# Patient Record
Sex: FEMALE | Race: WHITE | NOT HISPANIC OR LATINO | URBAN - METROPOLITAN AREA
[De-identification: names, ages, dates, MRNs, and addresses within clinical notes are randomized per-mention and may not be internally consistent; named-entity substitution may affect disease eponyms.]

---

## 2017-12-21 ENCOUNTER — EMERGENCY (EMERGENCY)
Facility: HOSPITAL | Age: 67
LOS: 0 days | Discharge: HOME | End: 2017-12-21
Attending: EMERGENCY MEDICINE | Admitting: EMERGENCY MEDICINE
Payer: MEDICARE

## 2017-12-21 VITALS
OXYGEN SATURATION: 100 % | SYSTOLIC BLOOD PRESSURE: 116 MMHG | DIASTOLIC BLOOD PRESSURE: 94 MMHG | RESPIRATION RATE: 18 BRPM | TEMPERATURE: 98 F | HEART RATE: 78 BPM

## 2017-12-21 VITALS — HEIGHT: 64 IN | WEIGHT: 169.09 LBS

## 2017-12-21 LAB
ALBUMIN SERPL ELPH-MCNC: 3.9 G/DL — SIGNIFICANT CHANGE UP (ref 3.3–5)
ALP SERPL-CCNC: 74 U/L — SIGNIFICANT CHANGE UP (ref 40–120)
ALT FLD-CCNC: 26 U/L — SIGNIFICANT CHANGE UP (ref 12–78)
ANION GAP SERPL CALC-SCNC: 9 MMOL/L — SIGNIFICANT CHANGE UP (ref 5–17)
APPEARANCE UR: CLEAR — SIGNIFICANT CHANGE UP
APTT BLD: 30 SEC — SIGNIFICANT CHANGE UP (ref 27.5–37.4)
AST SERPL-CCNC: 14 U/L — LOW (ref 15–37)
BACTERIA # UR AUTO: (no result)
BASOPHILS # BLD AUTO: 0.1 K/UL — SIGNIFICANT CHANGE UP (ref 0–0.2)
BASOPHILS NFR BLD AUTO: 0.9 % — SIGNIFICANT CHANGE UP (ref 0–2)
BILIRUB SERPL-MCNC: 1.1 MG/DL — SIGNIFICANT CHANGE UP (ref 0.2–1.2)
BILIRUB UR-MCNC: NEGATIVE — SIGNIFICANT CHANGE UP
BUN SERPL-MCNC: 11 MG/DL — SIGNIFICANT CHANGE UP (ref 7–23)
CALCIUM SERPL-MCNC: 8.7 MG/DL — SIGNIFICANT CHANGE UP (ref 8.5–10.1)
CHLORIDE SERPL-SCNC: 100 MMOL/L — SIGNIFICANT CHANGE UP (ref 96–108)
CO2 SERPL-SCNC: 24 MMOL/L — SIGNIFICANT CHANGE UP (ref 22–31)
COLOR SPEC: YELLOW — SIGNIFICANT CHANGE UP
CREAT SERPL-MCNC: 0.94 MG/DL — SIGNIFICANT CHANGE UP (ref 0.5–1.3)
DIFF PNL FLD: NEGATIVE — SIGNIFICANT CHANGE UP
EOSINOPHIL # BLD AUTO: 0.2 K/UL — SIGNIFICANT CHANGE UP (ref 0–0.5)
EOSINOPHIL NFR BLD AUTO: 2 % — SIGNIFICANT CHANGE UP (ref 0–6)
EPI CELLS # UR: SIGNIFICANT CHANGE UP
GLUCOSE SERPL-MCNC: 90 MG/DL — SIGNIFICANT CHANGE UP (ref 70–99)
GLUCOSE UR QL: NEGATIVE MG/DL — SIGNIFICANT CHANGE UP
HCT VFR BLD CALC: 35.6 % — SIGNIFICANT CHANGE UP (ref 34.5–45)
HGB BLD-MCNC: 12.2 G/DL — SIGNIFICANT CHANGE UP (ref 11.5–15.5)
INR BLD: 1.14 RATIO — SIGNIFICANT CHANGE UP (ref 0.88–1.16)
KETONES UR-MCNC: NEGATIVE — SIGNIFICANT CHANGE UP
LACTATE SERPL-SCNC: 0.6 MMOL/L — LOW (ref 0.7–2)
LEUKOCYTE ESTERASE UR-ACNC: NEGATIVE — SIGNIFICANT CHANGE UP
LIDOCAIN IGE QN: 80 U/L — SIGNIFICANT CHANGE UP (ref 73–393)
LYMPHOCYTES # BLD AUTO: 2.2 K/UL — SIGNIFICANT CHANGE UP (ref 1–3.3)
LYMPHOCYTES # BLD AUTO: 22.2 % — SIGNIFICANT CHANGE UP (ref 13–44)
MCHC RBC-ENTMCNC: 30.3 PG — SIGNIFICANT CHANGE UP (ref 27–34)
MCHC RBC-ENTMCNC: 34.2 GM/DL — SIGNIFICANT CHANGE UP (ref 32–36)
MCV RBC AUTO: 88.5 FL — SIGNIFICANT CHANGE UP (ref 80–100)
MONOCYTES # BLD AUTO: 1.1 K/UL — HIGH (ref 0–0.9)
MONOCYTES NFR BLD AUTO: 10.6 % — SIGNIFICANT CHANGE UP (ref 2–14)
NEUTROPHILS # BLD AUTO: 6.4 K/UL — SIGNIFICANT CHANGE UP (ref 1.8–7.4)
NEUTROPHILS NFR BLD AUTO: 64.4 % — SIGNIFICANT CHANGE UP (ref 43–77)
NITRITE UR-MCNC: POSITIVE
PH UR: 5 — SIGNIFICANT CHANGE UP (ref 5–8)
PLATELET # BLD AUTO: 271 K/UL — SIGNIFICANT CHANGE UP (ref 150–400)
POTASSIUM SERPL-MCNC: 3.8 MMOL/L — SIGNIFICANT CHANGE UP (ref 3.5–5.3)
POTASSIUM SERPL-SCNC: 3.8 MMOL/L — SIGNIFICANT CHANGE UP (ref 3.5–5.3)
PROT SERPL-MCNC: 7.5 GM/DL — SIGNIFICANT CHANGE UP (ref 6–8.3)
PROT UR-MCNC: NEGATIVE MG/DL — SIGNIFICANT CHANGE UP
PROTHROM AB SERPL-ACNC: 12.3 SEC — SIGNIFICANT CHANGE UP (ref 9.8–12.7)
RBC # BLD: 4.02 M/UL — SIGNIFICANT CHANGE UP (ref 3.8–5.2)
RBC # FLD: 11.6 % — SIGNIFICANT CHANGE UP (ref 10.3–14.5)
RBC CASTS # UR COMP ASSIST: SIGNIFICANT CHANGE UP /HPF (ref 0–4)
SODIUM SERPL-SCNC: 133 MMOL/L — LOW (ref 135–145)
SP GR SPEC: 1.01 — SIGNIFICANT CHANGE UP (ref 1.01–1.02)
TROPONIN I SERPL-MCNC: <0.015 NG/ML — SIGNIFICANT CHANGE UP (ref 0.01–0.04)
UROBILINOGEN FLD QL: NEGATIVE MG/DL — SIGNIFICANT CHANGE UP
WBC # BLD: 10 K/UL — SIGNIFICANT CHANGE UP (ref 3.8–10.5)
WBC # FLD AUTO: 10 K/UL — SIGNIFICANT CHANGE UP (ref 3.8–10.5)
WBC UR QL: (no result)

## 2017-12-21 PROCEDURE — 93010 ELECTROCARDIOGRAM REPORT: CPT

## 2017-12-21 PROCEDURE — 99285 EMERGENCY DEPT VISIT HI MDM: CPT

## 2017-12-21 PROCEDURE — 74177 CT ABD & PELVIS W/CONTRAST: CPT | Mod: 26

## 2017-12-21 RX ORDER — METRONIDAZOLE 500 MG
1 TABLET ORAL
Qty: 30 | Refills: 0
Start: 2017-12-21 | End: 2017-12-30

## 2017-12-21 RX ORDER — SODIUM CHLORIDE 9 MG/ML
3 INJECTION INTRAMUSCULAR; INTRAVENOUS; SUBCUTANEOUS ONCE
Qty: 0 | Refills: 0 | Status: COMPLETED | OUTPATIENT
Start: 2017-12-21 | End: 2017-12-21

## 2017-12-21 RX ORDER — CIPROFLOXACIN LACTATE 400MG/40ML
500 VIAL (ML) INTRAVENOUS ONCE
Qty: 0 | Refills: 0 | Status: COMPLETED | OUTPATIENT
Start: 2017-12-21 | End: 2017-12-21

## 2017-12-21 RX ORDER — MOXIFLOXACIN HYDROCHLORIDE TABLETS, 400 MG 400 MG/1
1 TABLET, FILM COATED ORAL
Qty: 20 | Refills: 0
Start: 2017-12-21 | End: 2017-12-30

## 2017-12-21 RX ORDER — METRONIDAZOLE 500 MG
500 TABLET ORAL ONCE
Qty: 0 | Refills: 0 | Status: COMPLETED | OUTPATIENT
Start: 2017-12-21 | End: 2017-12-21

## 2017-12-21 RX ADMIN — Medication 500 MILLIGRAM(S): at 19:12

## 2017-12-21 RX ADMIN — SODIUM CHLORIDE 3 MILLILITER(S): 9 INJECTION INTRAMUSCULAR; INTRAVENOUS; SUBCUTANEOUS at 15:40

## 2017-12-21 NOTE — ED STATDOCS - PROGRESS NOTE DETAILS
67 yr. old female PMH; HLD presents to ED with worsening abdominal painonset yesterday morning. Seen at Urgent Care and sent to ED for further evaluation. No fever or chills. NO N/V/D Seen and examined by attending in Intake. Plan: IV, Labs, CT Will F/U with results and re evaluate. Criselda NP Attending Bebeto: Results of labs and CT d/w patient, printouts given and pt understands need for otpt f/u for further workup of adnexal mass

## 2017-12-21 NOTE — ED ADULT NURSE NOTE - OBJECTIVE STATEMENT
Pt presents to ED c/o lower abd pain with activity starting yesterday morning. Pt reports last normal BM last night

## 2017-12-21 NOTE — ED STATDOCS - ATTENDING CONTRIBUTION TO CARE
I, Harris Tate MD,  performed the initial face to face bedside interview with this patient regarding history of present illness, review of symptoms and relevant past medical, social and family history.  I completed an independent physical examination.  I was the initial provider who evaluated this patient. I have signed out the follow up of any pending tests (i.e. labs, radiological studies) to the ACP.  I have communicated the patient’s plan of care and disposition with the ACP.  The history, relevant review of systems, past medical and surgical history, medical decision making, and physical examination was documented by the scribe in my presence and I attest to the accuracy of the documentation.  I, Harris Tate MD, personally saw the patient with ACP.  I have personally performed a face to face diagnostic evaluation on this patient.  I have reviewed the ACP note and agree with the history, exam, and plan of care, except as noted.

## 2017-12-21 NOTE — ED STATDOCS - OBJECTIVE STATEMENT
68 y/o F with a PMHx of HLD presents to the ED c/o worsening abd pain that started yesterday morning upon wakening. Pt states that she was seen at urgent care this morning and advised to come to the ED to r/o peritonitis as she was experiencing rebound TTP. Pt states that she has had a hernia in the past, currently calm, no distress and denies fever, cough, chills, CP, SOB, NVD or any other acute c/o at this time.

## 2017-12-22 DIAGNOSIS — E78.5 HYPERLIPIDEMIA, UNSPECIFIED: ICD-10-CM

## 2017-12-22 DIAGNOSIS — N39.0 URINARY TRACT INFECTION, SITE NOT SPECIFIED: ICD-10-CM

## 2017-12-22 DIAGNOSIS — R10.9 UNSPECIFIED ABDOMINAL PAIN: ICD-10-CM

## 2017-12-22 DIAGNOSIS — K57.92 DIVERTICULITIS OF INTESTINE, PART UNSPECIFIED, WITHOUT PERFORATION OR ABSCESS WITHOUT BLEEDING: ICD-10-CM

## 2017-12-27 PROBLEM — Z00.00 ENCOUNTER FOR PREVENTIVE HEALTH EXAMINATION: Noted: 2017-12-27

## 2018-01-11 ENCOUNTER — APPOINTMENT (OUTPATIENT)
Dept: OBGYN | Facility: CLINIC | Age: 68
End: 2018-01-11
Payer: MEDICARE

## 2018-01-11 VITALS
TEMPERATURE: 98.4 F | SYSTOLIC BLOOD PRESSURE: 118 MMHG | DIASTOLIC BLOOD PRESSURE: 72 MMHG | WEIGHT: 165 LBS | BODY MASS INDEX: 28.17 KG/M2 | RESPIRATION RATE: 16 BRPM | HEIGHT: 64 IN

## 2018-01-11 DIAGNOSIS — N83.201 UNSPECIFIED OVARIAN CYST, RIGHT SIDE: ICD-10-CM

## 2018-01-11 DIAGNOSIS — Z87.891 PERSONAL HISTORY OF NICOTINE DEPENDENCE: ICD-10-CM

## 2018-01-11 DIAGNOSIS — Z87.19 PERSONAL HISTORY OF OTHER DISEASES OF THE DIGESTIVE SYSTEM: ICD-10-CM

## 2018-01-11 DIAGNOSIS — Z86.39 PERSONAL HISTORY OF OTHER ENDOCRINE, NUTRITIONAL AND METABOLIC DISEASE: ICD-10-CM

## 2018-01-11 LAB
BILIRUB UR QL STRIP: NORMAL
CLARITY UR: CLEAR
COLLECTION METHOD: NORMAL
GLUCOSE UR-MCNC: NORMAL
HCG UR QL: 0.2 EU/DL
HGB UR QL STRIP.AUTO: NORMAL
KETONES UR-MCNC: NORMAL
LEUKOCYTE ESTERASE UR QL STRIP: NORMAL
NITRITE UR QL STRIP: NORMAL
PH UR STRIP: 6
PROT UR STRIP-MCNC: NORMAL
SP GR UR STRIP: 1.01

## 2018-01-11 PROCEDURE — 81003 URINALYSIS AUTO W/O SCOPE: CPT | Mod: QW

## 2018-01-11 PROCEDURE — G0101: CPT

## 2018-01-11 PROCEDURE — 99203 OFFICE O/P NEW LOW 30 MIN: CPT | Mod: 25

## 2018-01-12 LAB — HPV HIGH+LOW RISK DNA PNL CVX: NOT DETECTED

## 2018-01-16 ENCOUNTER — MOBILE ON CALL (OUTPATIENT)
Age: 68
End: 2018-01-16

## 2018-01-17 ENCOUNTER — RESULT REVIEW (OUTPATIENT)
Age: 68
End: 2018-01-17

## 2018-01-17 LAB
CA 125 (LABCORP): 9.3 U/ML
CYTOLOGY CVX/VAG DOC THIN PREP: NORMAL
HE 4: 78.9 PMOL/L
POSTMENOPAUSAL ROMA: 1.28
PREMENOPAUSAL ROMA: 1.88
ROMA COMMENT: NORMAL

## 2018-01-18 ENCOUNTER — RESULT REVIEW (OUTPATIENT)
Age: 68
End: 2018-01-18

## 2018-01-25 ENCOUNTER — APPOINTMENT (OUTPATIENT)
Dept: DERMATOLOGY | Facility: CLINIC | Age: 68
End: 2018-01-25

## 2019-11-13 PROBLEM — E78.5 HYPERLIPIDEMIA, UNSPECIFIED: Chronic | Status: ACTIVE | Noted: 2017-12-22

## 2019-12-04 ENCOUNTER — APPOINTMENT (OUTPATIENT)
Dept: OTOLARYNGOLOGY | Facility: CLINIC | Age: 69
End: 2019-12-04
Payer: MEDICARE

## 2019-12-04 VITALS — HEIGHT: 64 IN | WEIGHT: 165 LBS | BODY MASS INDEX: 28.17 KG/M2

## 2019-12-04 DIAGNOSIS — R06.83 SNORING: ICD-10-CM

## 2019-12-04 PROCEDURE — 31575 DIAGNOSTIC LARYNGOSCOPY: CPT

## 2019-12-04 PROCEDURE — 99204 OFFICE O/P NEW MOD 45 MIN: CPT | Mod: 25

## 2019-12-04 RX ORDER — MULTIVITAMIN
TABLET ORAL
Refills: 0 | Status: ACTIVE | COMMUNITY

## 2019-12-04 NOTE — PROCEDURE
[de-identified] : Indication for procedure:Unable to examine laryngeal structures with mirror exam\par Scope # \par Topical anesthesia with viscous xylocaine 2% is applied to the anterior nares.\par A flexible fiberoptic laryngoscope is than introduced through the nares.\par The nasopharynx is clear without mass or inflammation.\par The posterior pharyngeal wall is unremarkable.\par The tongue base and vallecula are unremarkable.  The hypopharynx is unremarkable and unobstructed.\par The supraglottic larynx is within normal limits. left 6 mm cystic mass left epiglottis not impinging on airway\par Both vocal cords are fully mobile with no nodule, polyp or other lesion present.\par There is no edema or erythema overlying the arytenoid cartilages or the inter-arytenoid space.\par The subglottic space is clear.\par The voice has a normal quality.\par

## 2019-12-04 NOTE — CONSULT LETTER
[FreeTextEntry1] : Dear Dr. ALLEN OVALLE,\par \par Thank you for your kind referral. Please refer to my enclosed office notes for VALDO GONZÁLES . If there are any questions free to contact me.\par  [Sincerely,] : Sincerely, [FreeTextEntry3] : Andrea Gonsales MD, FACS\par \par

## 2019-12-04 NOTE — HISTORY OF PRESENT ILLNESS
[de-identified] : co snoring\par no observed apnea no daytime fatigue\par co dysphagia points to sternal area only 2 episodes

## 2019-12-04 NOTE — ASSESSMENT
[FreeTextEntry1] : small left epiglottic cyst\par no airway impingement\par rec fu 3 mo\par snoirng without observed apnea or daytime fatigue\par reviewed option for snoring procedures in office  will consider

## 2019-12-04 NOTE — REVIEW OF SYSTEMS
[Sneezing] : sneezing [Ear Pain] : ear pain [Ear Itch] : ear itch [Eyes Itch] : itching of the eyes [Eye Pain] : eye pain [Throat Clearing] : throat clearing [Joint Pain] : joint pain [Muscle Weakness] : muscle weakness [Negative] : Heme/Lymph [Patient Intake Form Reviewed] : Patient intake form was reviewed

## 2020-01-14 NOTE — H&P ADULT - NSHPPHYSICALEXAM_GEN_ALL_CORE
PHYSICAL EXAM:    Vital Signs Last 24 Hrs  T(C): 36.7 (15 Randal 2020 07:49), Max: 36.7 (15 Randal 2020 07:49)  T(F): 98 (15 Randal 2020 07:49), Max: 98 (15 Randal 2020 07:49)  HR: 74 (15 Randal 2020 07:49) (74 - 74)  BP: 160/83 (15 Randal 2020 07:49) (160/83 - 160/83)  RR: 18 (15 Randal 2020 07:49) (18 - 18)  SpO2: 100% (15 Randal 2020 07:49) (100% - 100%)    Constitutional: NAD, well-groomed, well-developed  Neuro: Alert and oriented x 3 Gait steady Speech clear No focal deficits  Neck: No JVD No bruit  Respiratory: CTAB  Cardiovascular: S1 and S2, RRR,   Gastrointestinal: BS+, soft, NT/ND  Extremities: No clubbing cyanosis or edema No varicosities  Vascular: 2+ peripheral pulses  Psychiatric: Normal mood, normal affect  Musculoskeletal: 5/5 strength b/l upper and lower extremities

## 2020-01-14 NOTE — H&P ADULT - ASSESSMENT
69 year old female with PMHx of HLD was evaluated for a cardiology work up. Underwent ECHO EF 65-70%, Carotid duplex and nuclear stress test. Nuclear stress test suggestive of LAD ischemia. Referred for cardiac cath with possible PCI     ASA class:  Creatinine:  GFR:  Bleeding  Risk score: 69 year old female with PMHx of HLD, family Hx of heart disease was evaluated for a cardiology work up. Underwent ECHO EF 65-70%, Carotid duplex and nuclear stress test. Nuclear stress test suggestive of LAD ischemia. Referred for cardiac cath with possible PCI     ASA class:II  Creatinine:0.8  GFR:  Bleeding  Risk score: 1.7%

## 2020-01-14 NOTE — H&P ADULT - NSICDXFAMILYHX_GEN_ALL_CORE_FT
FAMILY HISTORY:  No pertinent family history in first degree relatives FAMILY HISTORY:  FHx: heart disease, mther CABG age 60

## 2020-01-14 NOTE — H&P ADULT - PROBLEM SELECTOR PLAN 1
-plan for cardiac cath with possible PCI  -Consent obtained for cardiac catheterization w/ coronary angiogram and possible stent placement. Pt is competent, has capacity, and understands risks and benefits of procedure. Risks and benefits discussed. Risk discussed included, but not limited to MI, stroke, mortality, major bleeding, arrythmia, or infection. All questions answered

## 2020-01-14 NOTE — H&P ADULT - HISTORY OF PRESENT ILLNESS
69 year old female with PMHx of HLD was evaluated for a cardiology work up. Underwent ECHO EF 65-70%, Carotid duplex and nuclear stress test. Nuclear stress test suggestive of LAD ischemia. Referred for cardiac cath with possible PCI 69 year old female with PMHx of HLD, family Hx of heart disease was evaluated for a cardiology work up. Underwent ECHO EF 65-70%, Carotid duplex and nuclear stress test. Nuclear stress test suggestive of LAD ischemia. Referred for cardiac cath with possible PCI

## 2020-01-15 ENCOUNTER — OUTPATIENT (OUTPATIENT)
Dept: INPATIENT UNIT | Facility: HOSPITAL | Age: 70
LOS: 1 days | Discharge: HOME | End: 2020-01-15
Payer: MEDICARE

## 2020-01-15 VITALS
SYSTOLIC BLOOD PRESSURE: 160 MMHG | HEIGHT: 64 IN | RESPIRATION RATE: 18 BRPM | HEART RATE: 74 BPM | WEIGHT: 164.24 LBS | OXYGEN SATURATION: 100 % | DIASTOLIC BLOOD PRESSURE: 83 MMHG

## 2020-01-15 VITALS — DIASTOLIC BLOOD PRESSURE: 82 MMHG | HEART RATE: 74 BPM | SYSTOLIC BLOOD PRESSURE: 115 MMHG | RESPIRATION RATE: 18 BRPM

## 2020-01-15 DIAGNOSIS — R94.39 ABNORMAL RESULT OF OTHER CARDIOVASCULAR FUNCTION STUDY: ICD-10-CM

## 2020-01-15 PROCEDURE — C1887: CPT

## 2020-01-15 PROCEDURE — C1769: CPT

## 2020-01-15 PROCEDURE — C1894: CPT

## 2020-01-15 PROCEDURE — 93458 L HRT ARTERY/VENTRICLE ANGIO: CPT

## 2020-01-15 NOTE — PACU DISCHARGE NOTE - COMMENTS
Pt. verb. agreement and understanding to and teach back to written and verbal discharge instructions and medication list and MD follow up.  Pt. discharged to home via private auto accompanied by family.

## 2020-01-16 DIAGNOSIS — I25.118 ATHEROSCLEROTIC HEART DISEASE OF NATIVE CORONARY ARTERY WITH OTHER FORMS OF ANGINA PECTORIS: ICD-10-CM

## 2020-01-16 DIAGNOSIS — I20.8 OTHER FORMS OF ANGINA PECTORIS: ICD-10-CM

## 2020-01-16 DIAGNOSIS — E78.5 HYPERLIPIDEMIA, UNSPECIFIED: ICD-10-CM

## 2020-01-21 ENCOUNTER — APPOINTMENT (OUTPATIENT)
Dept: OTOLARYNGOLOGY | Facility: CLINIC | Age: 70
End: 2020-01-21

## 2020-03-04 ENCOUNTER — APPOINTMENT (OUTPATIENT)
Dept: OTOLARYNGOLOGY | Facility: CLINIC | Age: 70
End: 2020-03-04
Payer: MEDICARE

## 2020-03-04 VITALS — HEIGHT: 64 IN | BODY MASS INDEX: 28.17 KG/M2 | WEIGHT: 165 LBS

## 2020-03-04 PROCEDURE — 31575 DIAGNOSTIC LARYNGOSCOPY: CPT

## 2020-03-04 PROCEDURE — 99214 OFFICE O/P EST MOD 30 MIN: CPT | Mod: 25

## 2020-03-04 NOTE — ASSESSMENT
[FreeTextEntry1] : epiglottic cyst small no change size\par rec fu 6 mo\par thyroid us 10/3/19 rt module upper pole .7 cm calcification 2.9 cm rt nodule heterogenous peripheral vascularity\par smaller nodule w calcification of some concern although small in size\par rec repeat us 2 mo \par consider fna of this nodule any larger

## 2020-03-04 NOTE — CONSULT LETTER
[Consult Closing:] : Thank you very much for allowing me to participate in the care of this patient.  If you have any questions, please do not hesitate to contact me. [Consult Letter:] : I had the pleasure of evaluating your patient, [unfilled]. [Sincerely,] : Sincerely, [FreeTextEntry1] : Dear Dr. ALLEN OVALLE,\par \par Thank you for your kind referral. Please refer to my enclosed office notes for VALDO GONZÁLES . If there are any questions free to contact me.\par  [FreeTextEntry3] : Andrea Gonsales MD, FACS\par

## 2020-03-04 NOTE — PROCEDURE
[de-identified] : Indication for procedure:Unable to examine laryngeal structures with mirror exam\par Scope # 5\par Topical anesthesia with viscous xylocaine 2% is applied to the anterior nares.\par A flexible fiberoptic laryngoscope is than introduced through the nares.\par The nasopharynx is clear without mass or inflammation.\par The posterior pharyngeal wall is unremarkable.\par The tongue base and vallecula are unremarkable.  The hypopharynx is unremarkable and unobstructed.\par The supraglottic larynx is within normal limits. left epiglottic smooth cystic mass .6 cm\par Both vocal cords are fully mobile with no nodule, polyp or other lesion present.\par There is no edema or erythema overlying the arytenoid cartilages or the inter-arytenoid space.\par The subglottic space is clear.\par The voice has a normal quality.\par

## 2020-09-08 ENCOUNTER — APPOINTMENT (OUTPATIENT)
Dept: OTOLARYNGOLOGY | Facility: CLINIC | Age: 70
End: 2020-09-08
Payer: MEDICARE

## 2020-09-08 VITALS — BODY MASS INDEX: 28.17 KG/M2 | WEIGHT: 165 LBS | TEMPERATURE: 95.3 F | HEIGHT: 64 IN

## 2020-09-08 DIAGNOSIS — H61.21 IMPACTED CERUMEN, RIGHT EAR: ICD-10-CM

## 2020-09-08 DIAGNOSIS — E04.1 NONTOXIC SINGLE THYROID NODULE: ICD-10-CM

## 2020-09-08 DIAGNOSIS — J38.7 OTHER DISEASES OF LARYNX: ICD-10-CM

## 2020-09-08 DIAGNOSIS — G52.1 DISORDERS OF GLOSSOPHARYNGEAL NERVE: ICD-10-CM

## 2020-09-08 PROCEDURE — 69210 REMOVE IMPACTED EAR WAX UNI: CPT | Mod: RT

## 2020-09-08 PROCEDURE — 99214 OFFICE O/P EST MOD 30 MIN: CPT | Mod: 25

## 2020-09-08 PROCEDURE — 31575 DIAGNOSTIC LARYNGOSCOPY: CPT

## 2020-09-08 NOTE — CONSULT LETTER
[Please see my note below.] : Please see my note below. [Consult Letter:] : I had the pleasure of evaluating your patient, [unfilled]. [Consult Closing:] : Thank you very much for allowing me to participate in the care of this patient.  If you have any questions, please do not hesitate to contact me. [Sincerely,] : Sincerely, [FreeTextEntry1] : Dear Dr. ALLEN OVALLE,\par \par Thank you for your kind referral. Please refer to my enclosed office notes for VALDO GONZÁLES . If there are any questions free to contact me.\par  [FreeTextEntry3] : Andrea Gonsales MD, FACS\par

## 2020-09-08 NOTE — PROCEDURE
[Cerumen Impaction] : Cerumen Impaction [de-identified] : Indication for procedure:Unable to examine laryngeal structures with mirror exam\par Scope # \par Topical anesthesia with viscous xylocaine 2% is applied to the anterior nares.\par A flexible fiberoptic laryngoscope is than introduced through the nares.\par The nasopharynx is clear without mass or inflammation.\par The posterior pharyngeal wall is unremarkable.\par The tongue base and vallecula are unremarkable.  The hypopharynx is unremarkable and unobstructed.\par The supraglottic larynx ireveals 6 mm smooth cystic mass base left epiglottis no impingement on airway\par Both vocal cords are fully mobile with no nodule, polyp or other lesion present.\par There is no edema or erythema overlying the arytenoid cartilages or the inter-arytenoid space.\par The subglottic space is clear.\par The voice has a normal quality.\par  [FreeTextEntry6] : Large amount cerumen cleared right ear instrumentation and suction.\par Ear canals and tympanic membranes  unremarkable.\par \par \par

## 2020-09-08 NOTE — HISTORY OF PRESENT ILLNESS
[de-identified] : fu re laryngea/epiglottic cyst and \par thyroid\par thyroid us 10/3/19 rt nodule upper pole.7 cm calcification 2.9 cm rt nodule heterogenous peripheral vascularity

## 2020-09-08 NOTE — PHYSICAL EXAM
[Midline] : trachea located in midline position [Laryngoscopy Performed] : laryngoscopy was performed, see procedure section for findings [Normal] : no rashes [de-identified] : johan diallo

## 2020-09-08 NOTE — ASSESSMENT
[FreeTextEntry1] : cerumen cleared ad\par left laryngeal cyst stable size-asymptomatic\par rec observation\par nodule x 2 thyroid one w calcification\par fu US\par fu exam 1 y

## 2020-09-19 ENCOUNTER — APPOINTMENT (OUTPATIENT)
Dept: ULTRASOUND IMAGING | Facility: CLINIC | Age: 70
End: 2020-09-19

## 2020-09-19 ENCOUNTER — OUTPATIENT (OUTPATIENT)
Dept: OUTPATIENT SERVICES | Facility: HOSPITAL | Age: 70
LOS: 1 days | End: 2020-09-19
Payer: MEDICARE

## 2020-09-19 DIAGNOSIS — H61.21 IMPACTED CERUMEN, RIGHT EAR: ICD-10-CM

## 2020-09-19 DIAGNOSIS — E04.1 NONTOXIC SINGLE THYROID NODULE: ICD-10-CM

## 2020-09-19 PROCEDURE — 76536 US EXAM OF HEAD AND NECK: CPT

## 2020-09-19 PROCEDURE — 76536 US EXAM OF HEAD AND NECK: CPT | Mod: 26

## 2020-10-12 ENCOUNTER — APPOINTMENT (OUTPATIENT)
Dept: ULTRASOUND IMAGING | Facility: CLINIC | Age: 70
End: 2020-10-12
Payer: MEDICARE

## 2020-10-12 ENCOUNTER — OUTPATIENT (OUTPATIENT)
Dept: OUTPATIENT SERVICES | Facility: HOSPITAL | Age: 70
LOS: 1 days | End: 2020-10-12
Payer: MEDICARE

## 2020-10-12 ENCOUNTER — RESULT REVIEW (OUTPATIENT)
Age: 70
End: 2020-10-12

## 2020-10-12 DIAGNOSIS — E04.1 NONTOXIC SINGLE THYROID NODULE: ICD-10-CM

## 2020-10-12 PROCEDURE — 88172 CYTP DX EVAL FNA 1ST EA SITE: CPT

## 2020-10-12 PROCEDURE — 88173 CYTOPATH EVAL FNA REPORT: CPT | Mod: 26

## 2020-10-12 PROCEDURE — 10005 FNA BX W/US GDN 1ST LES: CPT

## 2020-10-12 PROCEDURE — 88173 CYTOPATH EVAL FNA REPORT: CPT

## 2022-01-28 ENCOUNTER — APPOINTMENT (OUTPATIENT)
Dept: ORTHOPEDIC SURGERY | Facility: CLINIC | Age: 72
End: 2022-01-28
Payer: MEDICARE

## 2022-01-28 VITALS
HEIGHT: 64 IN | WEIGHT: 175 LBS | DIASTOLIC BLOOD PRESSURE: 75 MMHG | SYSTOLIC BLOOD PRESSURE: 108 MMHG | BODY MASS INDEX: 29.88 KG/M2

## 2022-01-28 PROCEDURE — 73560 X-RAY EXAM OF KNEE 1 OR 2: CPT | Mod: LT

## 2022-01-28 PROCEDURE — 99204 OFFICE O/P NEW MOD 45 MIN: CPT | Mod: 25

## 2022-01-28 PROCEDURE — 20610 DRAIN/INJ JOINT/BURSA W/O US: CPT | Mod: LT

## 2022-02-01 NOTE — ADDENDUM
[FreeTextEntry1] : This note was written by Devon Escobedo on 02/01/2022, acting as a scribe for MIGUEL WILLARD, BRYON/L, PA

## 2022-02-01 NOTE — PHYSICAL EXAM
[de-identified] : Right Knee: Range of Motion in Degrees	\par 	                  Claimant:	Normal:	\par Flexion Active	  135 	                135-degrees	\par Flexion Passive	  135	                135-degrees	\par Extension Active	  0-5	                0-5-degrees	\par Extension Passive	  0-5	                0-5-degrees	\par \par No weakness to flexion/extension.  No evidence of instability in the AP plane or varus or valgus stress.  Negative  Lachman.  Negative pivot shift.  Negative anterior drawer test.  Negative posterior drawer test.  Negative Cathy.  Negative Apley grind.  Positive tenderness over the medial and lateral facet of the patella.  Positive patellofemoral crepitations.  No lateral tilting patella.  No patella apprehension.  Positive crepitation in the medial and lateral femoral condyle.  No proximal or distal swelling, edema or tenderness.  No gross motor or sensory deficits.  Mild intra-articular swelling.  2+ DP and PT pulses.  No varus or valgus malalignment.  Skin is intact.  No rashes, scars or lesions.  \par \par Left Knee: Range of Motion in Degrees	\par 	                  Claimant:	Normal:	\par Flexion Active	  135 	                135-degrees	\par Flexion Passive	  135	                135-degrees	\par Extension Active	  0-5	                0-5-degrees	\par Extension Passive	  0-5	                0-5-degrees	\par \par No weakness to flexion/extension.  No evidence of instability in the AP plane or varus or valgus stress.  Negative  Lachman.  Negative pivot shift.  Negative anterior drawer test.  Negative posterior drawer test.  Negative Cathy.  Negative Apley grind.  Positive tenderness over the medial and lateral facet of the patella.  Positive patellofemoral crepitations.  No lateral tilting patella.  No patella apprehension.  Positive crepitation in the medial and lateral femoral condyle.  No proximal or distal swelling, edema or tenderness.  No gross motor or sensory deficits.  Mild intra-articular swelling.  2+ DP and PT pulses.  No varus or valgus malalignment.  Skin is intact.  No rashes, scars or lesions. \par  [de-identified] : Gait and Station:  Ambulating with a slightly antalgic to antalgic gait.  Normal Station.  [de-identified] : Appearance:  Well developed, well-nourished female in no acute distress.\par   [de-identified] : Radiographs, one to two views of the right knee, show moderate osteoarthritis.\par Radiographs, one to two views of the left knee, show moderate osteoarthritis.\par Radiograph, one view AP standing of bilateral knees, shows moderate osteoarthritis.\par

## 2022-02-01 NOTE — HISTORY OF PRESENT ILLNESS
[de-identified] : The patient comes in today with complaints of bilateral knee pain (left worse than right).  This injury is not work related or due to an automobile accident.   [10] : a current pain level of 10/10 [de-identified] : Bending [] : No

## 2022-02-01 NOTE — DISCUSSION/SUMMARY
[de-identified] : At this time, due to osteoarthritis of bilateral knees (mostly in the medial compartment and left knee worse than right), I recommended ice and elevation for the left knee osteoarthritis.  If she is not better, a discussion was had regarding doing gel injections, but we will see how she feels in a week.\par

## 2022-02-01 NOTE — PROCEDURE
[de-identified] : \par Consent: \par At this time, I have recommended an injection to the left knee.  The risks and benefits of the procedure were discussed with the patient in detail.  Upon verbal consent of the patient, we proceeded with the injection as noted below.  \par \par Procedure:  \par After a sterile prep, the patient underwent an injection of 9 cc of 1% Lidocaine without epinephrine and 1 cc of Kenalog into the left knee.  The patient tolerated the procedure well.  There were no complications.  \par \par

## 2022-02-11 ENCOUNTER — APPOINTMENT (OUTPATIENT)
Dept: ORTHOPEDIC SURGERY | Facility: CLINIC | Age: 72
End: 2022-02-11
Payer: MEDICARE

## 2022-02-11 PROCEDURE — 99441: CPT | Mod: 95

## 2022-03-15 ENCOUNTER — OUTPATIENT (OUTPATIENT)
Dept: OUTPATIENT SERVICES | Facility: HOSPITAL | Age: 72
LOS: 1 days | End: 2022-03-15
Payer: MEDICARE

## 2022-03-15 ENCOUNTER — RESULT REVIEW (OUTPATIENT)
Age: 72
End: 2022-03-15

## 2022-03-15 ENCOUNTER — APPOINTMENT (OUTPATIENT)
Dept: ORTHOPEDIC SURGERY | Facility: CLINIC | Age: 72
End: 2022-03-15
Payer: MEDICARE

## 2022-03-15 ENCOUNTER — APPOINTMENT (OUTPATIENT)
Dept: ULTRASOUND IMAGING | Facility: CLINIC | Age: 72
End: 2022-03-15
Payer: MEDICARE

## 2022-03-15 VITALS
DIASTOLIC BLOOD PRESSURE: 83 MMHG | SYSTOLIC BLOOD PRESSURE: 136 MMHG | HEIGHT: 64 IN | HEART RATE: 71 BPM | BODY MASS INDEX: 29.88 KG/M2 | WEIGHT: 175 LBS

## 2022-03-15 DIAGNOSIS — M79.662 PAIN IN LEFT LOWER LEG: ICD-10-CM

## 2022-03-15 PROCEDURE — 73600 X-RAY EXAM OF ANKLE: CPT | Mod: LT

## 2022-03-15 PROCEDURE — 93971 EXTREMITY STUDY: CPT

## 2022-03-15 PROCEDURE — 93971 EXTREMITY STUDY: CPT | Mod: 26,LT

## 2022-03-15 PROCEDURE — 99212 OFFICE O/P EST SF 10 MIN: CPT

## 2022-03-17 NOTE — PHYSICAL EXAM
[de-identified] : Right ankle:\par Ankle: Range of Motion in Degrees:\par 	                                Claimant:	                Normal:	\par Dorsiflexion (Active)	40-degrees	40-degrees	\par Dorsiflexion (Passive)	40-degrees	40-degrees	\par Plantar (Active)	                40-degrees	40-degrees	\par Plantar (Passive)	                40-degrees	40-degrees	\par Inversion (Active)	                30-degrees	30-degrees	\par Inversion (Passive)	                30-degrees	30-degrees	\par Eversion  (Active)	                20-degrees	20-degrees	\par Eversion (Passive) 	                20-degrees	20-degrees	\par \par No weakness in dorsiflexion, plantar flexion, inversion or eversion.  Normal sensation.  No tenderness over the medial or lateral ligaments.  No tenderness over the DLES.  No evidence of instability.  Negative anterior drawer sign.  No medial or lateral bony tenderness.  No proximal fibular tenderness.  No anterior, posterior, medial or lateral tendon tenderness.  No intra-articular swelling.  No extra-articular swelling, edema or tenderness.  No tenderness over the plantar aspect of the os calcis.  2+ DP and PT pulses. Skin is intact.  No rashes, scars or lesions.  \par  \par Left ankle:\par Range of motion of the ankle is not assessed secondary to the swelling.  No weakness in dorsiflexion, plantar flexion, inversion or eversion.  Normal sensation.  Diffuse crepitations.  Mild to moderate intra-articular swelling.  No tenderness over the medial or lateral ligaments.  No tenderness over the DLES.  No evidence of instability.  Negative anterior drawer sign.  No medial or lateral bony tenderness.  No proximal fibular tenderness.  No anterior, posterior, medial or lateral tendon tenderness.  No tenderness over the plantar aspect of the os calcis.  2+ DP and PT pulses.  Skin is intact.  No rashes, scars or lesions.  \par \par Left calf: \par She does have some swelling to the calf as well and when pressed on it, she does have some pain [de-identified] : She walks with an antalgic gait pattern.   [de-identified] : Appearance: Well-developed, well-nourished female  in no acute distress.  [de-identified] : X-ray examination, two views of the left ankle, reveals arthritis mostly on the medial side.

## 2022-03-17 NOTE — HISTORY OF PRESENT ILLNESS
[de-identified] : Zoey comes in today with complaints of left ankle pain.  She states she has had a swollen ankle for quite a while (for years). She is in flip-flops.  She states that she gets a sneaker on all the time.  She has just been living like this.  She also denies any heart problems, but it does look like she is on an aspirin a day.

## 2022-03-17 NOTE — DISCUSSION/SUMMARY
[de-identified] : The patient presents with left ankle swelling, OA of the left ankle and calf swelling.  The patient is going to get a Doppler as soon as possible to rule out a blood clot.  She is being sent over to Dr. Lauren for his expertise for her left ankle.

## 2022-03-17 NOTE — ADDENDUM
[FreeTextEntry1] : This note was written by Jasmina Toro on 03/17/2022 acting as scribe for Ellie Escudero, HILARIOR/MICHELLE, PA.\par

## 2022-05-24 ENCOUNTER — APPOINTMENT (OUTPATIENT)
Dept: ORTHOPEDIC SURGERY | Facility: CLINIC | Age: 72
End: 2022-05-24

## 2022-05-24 VITALS — HEIGHT: 64 IN | BODY MASS INDEX: 29.88 KG/M2 | WEIGHT: 175 LBS

## 2022-05-24 PROCEDURE — 73620 X-RAY EXAM OF FOOT: CPT | Mod: 26,LT

## 2022-05-24 PROCEDURE — 73600 X-RAY EXAM OF ANKLE: CPT | Mod: 26,LT

## 2022-05-24 PROCEDURE — 99213 OFFICE O/P EST LOW 20 MIN: CPT

## 2022-05-25 NOTE — PHYSICAL EXAM
[de-identified] : Left Foot: Range of Motion in Degrees:\par 	                                Claimant:	                Normal:	\par Dorsiflexion (Active)	40-degrees	40-degrees	\par Dorsiflexion (Passive)	40-degrees	40-degrees	\par Plantar (Ankle)	                40-degrees	40-degrees	\par Plantar (Passive)	                40-degrees	40-degrees	\par Inversion (Active)	                30-degrees	30-degrees	\par Inversion (Passive)   	30-degrees	30-degrees	\par Eversion  (Active)	                20-degrees	20-degrees	\par Eversion (Passive) 	                20-degrees	20-degrees	\par \par There is left ankle swelling and left foot swelling.  She is tender along the top of the foot significantly.  She has significant pain in the foot and ankle secondary to the swelling.  No calf tenderness.\par \par  [de-identified] : Gait and Station:  Ambulating with a slightly antalgic to antalgic gait.  Normal Station.  [de-identified] : Appearance:  Well developed, well-nourished female in no acute distress.\par   [de-identified] : Radiographs, two views of the left foot taken in the office today, reveal arthritis.\par Radiographs, two views of the left ankle taken in the office today, reveal arthritis.  There are no acute fractures noted.\par

## 2022-05-25 NOTE — DISCUSSION/SUMMARY
[de-identified] : At this time, due to arthritis of the left foot, the patient will be placed into a CAM boot to see if we can get some of the swelling down.  We are going to get MRIs to rule out any fractures because she did have trauma.  I recommended ice, anti-inflammatory, leave the CAM boot on and return to the office in 2 weeks to review the MRIs.  \par

## 2022-05-25 NOTE — HISTORY OF PRESENT ILLNESS
[de-identified] : The patient comes in today with complaints of swelling to her left foot.  She had a Doppler, which was negative.  She has swelling to the foot and after she fell and had trauma about 6-8 weeks ago, it never went down.  \par \par

## 2022-05-25 NOTE — ADDENDUM
[FreeTextEntry1] : This note was written by Devon Escobedo on 05/25/2022, acting as a scribe for MIGUEL WILLARD, BRYON/L, PA

## 2022-06-03 ENCOUNTER — RESULT REVIEW (OUTPATIENT)
Age: 72
End: 2022-06-03

## 2022-06-03 ENCOUNTER — APPOINTMENT (OUTPATIENT)
Dept: MRI IMAGING | Facility: CLINIC | Age: 72
End: 2022-06-03
Payer: MEDICARE

## 2022-06-03 ENCOUNTER — OUTPATIENT (OUTPATIENT)
Dept: OUTPATIENT SERVICES | Facility: HOSPITAL | Age: 72
LOS: 1 days | End: 2022-06-03
Payer: MEDICARE

## 2022-06-03 DIAGNOSIS — M25.472 EFFUSION, LEFT ANKLE: ICD-10-CM

## 2022-06-03 DIAGNOSIS — S92.902A UNSPECIFIED FRACTURE OF LEFT FOOT, INITIAL ENCOUNTER FOR CLOSED FRACTURE: ICD-10-CM

## 2022-06-03 PROCEDURE — 73718 MRI LOWER EXTREMITY W/O DYE: CPT | Mod: MH

## 2022-06-03 PROCEDURE — 73718 MRI LOWER EXTREMITY W/O DYE: CPT | Mod: 26,LT,MH

## 2022-06-03 PROCEDURE — 73721 MRI JNT OF LWR EXTRE W/O DYE: CPT | Mod: 26,LT,MH

## 2022-06-03 PROCEDURE — 73721 MRI JNT OF LWR EXTRE W/O DYE: CPT | Mod: MH

## 2022-06-09 ENCOUNTER — APPOINTMENT (OUTPATIENT)
Dept: ORTHOPEDIC SURGERY | Facility: CLINIC | Age: 72
End: 2022-06-09

## 2022-06-09 DIAGNOSIS — S92.902A UNSPECIFIED FRACTURE OF LEFT FOOT, INITIAL ENCOUNTER FOR CLOSED FRACTURE: ICD-10-CM

## 2022-06-09 PROCEDURE — 99441: CPT | Mod: 95

## 2022-06-10 ENCOUNTER — APPOINTMENT (OUTPATIENT)
Dept: ORTHOPEDIC SURGERY | Facility: CLINIC | Age: 72
End: 2022-06-10

## 2022-06-10 PROCEDURE — 73560 X-RAY EXAM OF KNEE 1 OR 2: CPT | Mod: LT

## 2022-06-10 PROCEDURE — 99214 OFFICE O/P EST MOD 30 MIN: CPT

## 2022-06-10 RX ORDER — CEPHALEXIN 500 MG/1
500 CAPSULE ORAL 3 TIMES DAILY
Qty: 21 | Refills: 0 | Status: ACTIVE | COMMUNITY
Start: 2022-06-10 | End: 1900-01-01

## 2022-06-10 RX ORDER — HYALURONATE SODIUM 10 MG/ML
25 SYRINGE (ML) INTRAARTICULAR
Qty: 5 | Refills: 0 | Status: ACTIVE | OUTPATIENT
Start: 2022-06-10

## 2022-06-13 ENCOUNTER — APPOINTMENT (OUTPATIENT)
Dept: ORTHOPEDIC SURGERY | Facility: CLINIC | Age: 72
End: 2022-06-13
Payer: MEDICARE

## 2022-06-13 ENCOUNTER — NON-APPOINTMENT (OUTPATIENT)
Age: 72
End: 2022-06-13

## 2022-06-13 DIAGNOSIS — M79.662 PAIN IN LEFT LOWER LEG: ICD-10-CM

## 2022-06-13 DIAGNOSIS — M19.072 PRIMARY OSTEOARTHRITIS, LEFT ANKLE AND FOOT: ICD-10-CM

## 2022-06-13 DIAGNOSIS — M25.472 EFFUSION, LEFT ANKLE: ICD-10-CM

## 2022-06-13 PROCEDURE — 99214 OFFICE O/P EST MOD 30 MIN: CPT

## 2022-06-13 RX ORDER — METHYLPREDNISOLONE 4 MG/1
4 TABLET ORAL
Qty: 1 | Refills: 0 | Status: ACTIVE | COMMUNITY
Start: 2022-06-13 | End: 1900-01-01

## 2022-06-13 NOTE — HISTORY OF PRESENT ILLNESS
[FreeTextEntry1] : The patient is a 72 year old female presenting for an initial evaluation of left ankle injury. The patient reports that she lost her balance and fall, resulting in a twisting injury to her ankle in January 2022. History of a second fall when traveling out of her carShe was evaluated for this by Ellie Escudero and was referred today for further evaluation of the same. She was recommended a CAM boot, with the patient reporting that she has been wearing the CAM boot for the past three weeks. She obtained a negative DVT done on 3/15/2022. Pain is localized to the anterior ankle. She does have swelling present in her ankle and foot, confirming swelling prior to the fall. The patient presents ambulating with a cane and is wearing the CAM boot. No history of formal PT. No other complaints at this time.

## 2022-06-13 NOTE — ADDENDUM
[FreeTextEntry1] : I, Lilian Quesada, acted solely as a scribe for Dr. Roney Lauren on this date 06/13/2022.\par \par All medical record entries made by the Scribe were at my, Dr. Roney Lauren, direction and personally dictated by me on 06/13/2022 . I have reviewed the chart and agree that the record accurately reflects my personal performance of the history, physical exam, assessment and plan. I have also personally directed, reviewed, and agreed with the chart.	\par

## 2022-06-13 NOTE — PHYSICAL EXAM
[de-identified] : General: Alert and oriented x3. In no acute distress. Pleasant in nature with a normal affect. No apparent respiratory distress.\par \par Left Foot and Ankle Exam\par Skin: Clean, dry, intact\par Inspection: Slight curvature to the second toe. No obvious malalignment, no masses, + swelling to the ankle and foot, no effusion\par Pulses: 2+ DP/PT pulses\par ROM: FOOT Full  ROM of digits, ANKLE 10 degrees of dorsiflexion, 40 degrees of plantarflexion, 10 degrees of subtalar motion.\par Painful ROM: None\par Tenderness: Pain to the ankle. No tenderness over the medial malleolus, No tenderness over the lateral malleolus, no CFL/ATFL/PTFL pain, no deltoid ligament pain. No heel pain. No Achilles tenderness. No 5th metatarsal pain. No pain to the LisFranc joint. No ttp over the posterior tibial tendon.\par Stability: Negative anterior/posterior drawer.\par Strength: 5/5 ADD/ABD/TA/GS/EHL/FHL/EDL\par Neuro: Sensation in tact to light touch throughout\par Additional tests: Negative Mortons test, negative tarsal tunnel tinels, negative single heel rise.	\par 			 [de-identified] : EXAM: [id number] - MR ANKLE LT - ORDERED BY: MIGUEL WILLARD\par \par EXAM: [id number] - MR FOOT LT - ORDERED BY: MIGUEL WILLARD\par \par \par PROCEDURE DATE: 06/03/2022\par \par \par \par INTERPRETATION: LEFT ANKLE AND FOOT MRI\par \par CLINICAL INFORMATION: Diffuse foot and ankle pain x1 month.\par TECHNIQUE: Multiplanar, multisequence MRI was obtained of the left ankle and foot.\par \par FINDINGS:\par \par MUSCLES AND TENDONS: The flexor tendons are intact. Focal flattening of the peroneus brevis tendon at the level of the lateral malleoli region with distal reconstitution. Peroneus longus is intact. Anterior extensors are unremarkable. Achilles tendon is intact. No muscle edema. Mild atrophy of the abductor digiti minimi.\par LIGAMENTS: Syndesmotic ligaments are intact. Scar remodeling of the anterior talofibular ligament. Calcaneofibular and posterior talofibular ligaments are intact. Deltoid ligament is intact. Lisfranc ligament is intact. Capsular structures are intact.\par CARTILAGE and SUBCHONDRAL BONE: Arthrosis with multifocal areas of subchondral cystic change and edema at the navicular cuneiform articulations and the second and third to a lesser extent the fourth and fifth tarsometatarsal articulations. Sequela of subacute to chronic subchondral fracture of the second metatarsal head focal flattening of the articular surface. No significant surrounding edema.\par SYNOVIUM/JOINT FLUID: No joint effusion or synovitis.\par MARROW: No acute fracture. Multifocal areas subchondral cystic change and edema. Subacute to chronic subchondral fracture of the second metatarsal head.\par PLANTAR FASCIA: Intact.\par NEUROVASCULAR STRUCTURES: Course of the neurovascular structures are unremarkable.\par SINUS TARSI: Fat within the sinus Tarsi is maintained.\par PERIPHERAL/ SUB-CUTANEOUS SOFT TISSUES: Diffuse subcutaneous edema about the ankle and along the dorsum of the foot. 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 Subacute to chronic focal subchondral fracture of the second metatarsal head.\par Subcutaneous edema about the ankle and dorsum of the foot.\par \par --- End of Report ---\par \par \par \par \par \par \par HILDA MOCTEZUMA MD; Attending Radiologist\par This document has been electronically signed. Jun 9 2022 1:39PM\par \par \par EXAM: [id number] - MR ANKLE LT - ORDERED BY: MIGUEL WILLARD\par \par EXAM: [id number] - MR FOOT LT - ORDERED BY: MIGUEL WILLARD\par \par \par PROCEDURE DATE: 06/03/2022\par \par \par \par INTERPRETATION: LEFT ANKLE AND FOOT MRI\par \par CLINICAL INFORMATION: Diffuse foot and ankle pain x1 month.\par TECHNIQUE: Multiplanar, multisequence MRI was obtained of the left ankle and foot.\par \par FINDINGS:\par \par MUSCLES AND TENDONS: The flexor tendons are intact. Focal flattening of the peroneus brevis tendon at the level of the lateral malleoli region with distal reconstitution. Peroneus longus is intact. Anterior extensors are unremarkable. Achilles tendon is intact. No muscle edema. Mild atrophy of the abductor digiti minimi.\par LIGAMENTS: Syndesmotic ligaments are intact. Scar remodeling of the anterior talofibular ligament. Calcaneofibular and posterior talofibular ligaments are intact. Deltoid ligament is intact. Lisfranc ligament is intact. Capsular structures are intact.\par CARTILAGE and SUBCHONDRAL BONE: Arthrosis with multifocal areas of subchondral cystic change and edema at the navicular cuneiform articulations and the second and third to a lesser extent the fourth and fifth tarsometatarsal articulations. Sequela of subacute to chronic subchondral fracture of the second metatarsal head focal flattening of the articular surface. No significant surrounding edema.\par SYNOVIUM/JOINT FLUID: No joint effusion or synovitis.\par MARROW: No acute fracture. Multifocal areas subchondral cystic change and edema. Subacute to chronic subchondral fracture of the second metatarsal head.\par PLANTAR FASCIA: Intact.\par NEUROVASCULAR STRUCTURES: Course of the neurovascular structures are unremarkable.\par SINUS TARSI: Fat within the sinus Tarsi is maintained.\par PERIPHERAL/ SUB-CUTANEOUS SOFT TISSUES: Diffuse subcutaneous edema about the ankle and along the dorsum of the foot. 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 Subacute to chronic focal subchondral fracture of the second metatarsal head.\par Subcutaneous edema about the ankle and dorsum of the foot.\par \par --- End of Report ---\par \par \par \par HILDA MOCTEZUMA MD; Attending Radiologist\par This document has been electronically signed. Jun 9 2022 1:39PM\par

## 2022-06-13 NOTE — DISCUSSION/SUMMARY
[de-identified] : Today I had a lengthy discussion with the patient regarding their left foot and ankle pain. I have addressed all the patient's concerns surrounding the pathology of their condition. MRI results were reviewed with the patient today in the office. I recommend that the patient utilize a methylprednisolone steroid taper pack. The prescription was provided in the office today. I recommend the patient undergo a course of physical therapy for the left ankle/foot  2-3 times a week for a total of 8-12 weeks. A prescription was given for the physical therapy today. I recommended that the patient begin to transition out of the CAM boot as tolerated. The patient understood and verbally agreed to the treatment plan. All of their questions were answered and they were satisfied with the visit. The patient should call the office if they have any questions or experience worsening symptoms. I would like to see the patient back in the office in 1 month to reassess their condition. 				\par

## 2022-06-14 NOTE — DISCUSSION/SUMMARY
[de-identified] : The patient presents with arthritis of the left foot and the left knee.  The patient is going to continue in the CAM boot.  We are going to give her an antibiotic for cellulitis of the left foot.  She is going to see Dr. Lauren on Monday and she will return to our office for the start of gel injections for the left knee.

## 2022-06-14 NOTE — HISTORY OF PRESENT ILLNESS
[de-identified] : Zoey comes in today with left foot swelling.  She did get an MRI which did confirm that she has significant arthritis of the left foot.  We are going to keep her in the CAM boot.

## 2022-06-14 NOTE — PHYSICAL EXAM
[de-identified] : Left foot:\par Physical exam today does reveal that she does have some redness in the dorsum of the foot and also still significant swelling, but it has gone down significantly since her last visit.\par \par \par Left knee:\par Knee: Range of Motion in Degrees	\par 	                  Claimant:	Normal:	\par Flexion Active	  135 	                135-degrees	\par Flexion Passive	  135	                135-degrees	\par Extension Active	  0-5	                0-5-degrees	\par Extension Passive	  0-5	                0-5-degrees	\par \par No weakness to flexion/extension. No evidence of instability in the AP plane or varus or valgus stress.  Negative  Lachman.  Negative pivot shift.  Negative anterior drawer test.  Negative posterior drawer test.  Negative Cathy.  Negative Apley grind.  No medial or lateral joint line tenderness.  Positive tenderness over the medial and lateral facet of the patella.  Positive patellofemoral crepitations.  No lateral tilting patella.  No patella apprehension.  Positive crepitation in the medial and lateral femoral condyle.  No proximal or distal swelling, edema or tenderness.  No gross motor or sensory deficits. Mild intra-articular swelling.  2+ DP and PT pulses. No varus or valgus malalignment.  Skin is intact.  No rashes, scars or lesions.  [de-identified] : Gait: Slightly antalgic to antalgic gait.  Station: Normal  [de-identified] : Appearance: Well-developed, well-nourished female  in no acute distress.  [de-identified] : X-rays taken in the office today, two views of the left knee, reveals no acute fractures or dislocations.

## 2022-06-14 NOTE — ADDENDUM
[FreeTextEntry1] : This note was written by Jasmina Toro on 06/14/2022 acting as scribe for Ellie Escudero, HILARIOR/MICHELLE, PA.\par

## 2022-06-15 PROBLEM — S92.902A FOOT FRACTURE, LEFT, CLOSED, INITIAL ENCOUNTER: Status: ACTIVE | Noted: 2022-05-24

## 2022-06-17 ENCOUNTER — APPOINTMENT (OUTPATIENT)
Dept: ORTHOPEDIC SURGERY | Facility: CLINIC | Age: 72
End: 2022-06-17
Payer: MEDICARE

## 2022-06-17 PROCEDURE — 20610 DRAIN/INJ JOINT/BURSA W/O US: CPT | Mod: LT

## 2022-06-21 NOTE — PHYSICAL EXAM
[de-identified] : Left knee:\par Knee: Range of Motion in Degrees	\par 	  Claimant:	Normal:	\par Flexion Active	 135 	 135-degrees	\par Flexion Passive	 135	 135-degrees	\par Extension Active	 0-5	 0-5-degrees	\par Extension Passive	 0-5	 0-5-degrees	\par \par No weakness to flexion/extension. No evidence of instability in the AP plane or varus or valgus stress. Negative Lachman. Negative pivot shift. Negative anterior drawer test. Negative posterior drawer test. Negative Cathy. Negative Apley grind. No medial or lateral joint line tenderness. Positive tenderness over the medial and lateral facet of the patella. Positive patellofemoral crepitations. No lateral tilting patella. No patella apprehension. Positive crepitation in the medial and lateral femoral condyle. No proximal or distal swelling, edema or tenderness. No gross motor or sensory deficits. Mild intra-articular swelling. 2+ DP and PT pulses. No varus or valgus malalignment. Skin is intact. No rashes, scars or lesions.

## 2022-06-21 NOTE — PROCEDURE
[de-identified] : Indication:\par Osteoarthritis of the left knee\par \par Consent:\par The risks and benefits of the procedure were discussed with the patient in detail.  Upon verbal consent of the patient, we proceeded with the GenVisc 850 injection as noted below.  \par \par Description of Procedure:\par After a sterile prep, the patient underwent a GenVisc 850 injection of 25 mg of Sodium Hyaluronate in a 2.5 mL syringe into the left knee.  The patient tolerated the procedure well.  There were no complications.  \par \par :  [employer]par NDC#:  [address]-57\par Lot#:  [id number]\par Exp Date:  11/30/2024 \par \par Plan:\par I have recommended ice and elevation.  The patient will be reassessed in one week for the next GenVisc 850 injection for the left knee osteoarthritis.   \par  \par

## 2022-06-21 NOTE — ADDENDUM
[FreeTextEntry1] : This note was written by Magdalene Duarte on 06/21/2022 acting as scribe for Ellie Escudero, OTR/L, PA

## 2022-06-24 ENCOUNTER — APPOINTMENT (OUTPATIENT)
Dept: ORTHOPEDIC SURGERY | Facility: CLINIC | Age: 72
End: 2022-06-24

## 2022-06-24 PROCEDURE — 20610 DRAIN/INJ JOINT/BURSA W/O US: CPT | Mod: LT

## 2022-07-05 ENCOUNTER — APPOINTMENT (OUTPATIENT)
Dept: ORTHOPEDIC SURGERY | Facility: CLINIC | Age: 72
End: 2022-07-05

## 2022-07-11 ENCOUNTER — INPATIENT (INPATIENT)
Facility: HOSPITAL | Age: 72
LOS: 6 days | Discharge: NURSING, REHAB OR LONG-TERM CARE | DRG: 177 | End: 2022-07-18
Attending: FAMILY MEDICINE | Admitting: FAMILY MEDICINE
Payer: MEDICARE

## 2022-07-11 VITALS
HEART RATE: 81 BPM | WEIGHT: 175.05 LBS | HEIGHT: 64 IN | SYSTOLIC BLOOD PRESSURE: 75 MMHG | OXYGEN SATURATION: 95 % | DIASTOLIC BLOOD PRESSURE: 59 MMHG | RESPIRATION RATE: 18 BRPM | TEMPERATURE: 96 F

## 2022-07-11 DIAGNOSIS — I48.91 UNSPECIFIED ATRIAL FIBRILLATION: ICD-10-CM
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
## 2022-07-11 DIAGNOSIS — Z29.9 ENCOUNTER FOR PROPHYLACTIC MEASURES, UNSPECIFIED: ICD-10-CM

## 2022-07-11 DIAGNOSIS — U07.1 COVID-19: ICD-10-CM

## 2022-07-11 LAB
ALBUMIN SERPL ELPH-MCNC: 4.2 G/DL — SIGNIFICANT CHANGE UP (ref 3.3–5)
ALP SERPL-CCNC: 85 U/L — SIGNIFICANT CHANGE UP (ref 40–120)
ALT FLD-CCNC: 31 U/L — SIGNIFICANT CHANGE UP (ref 12–78)
ANION GAP SERPL CALC-SCNC: 9 MMOL/L — SIGNIFICANT CHANGE UP (ref 5–17)
APTT BLD: 30.7 SEC — SIGNIFICANT CHANGE UP (ref 27.5–35.5)
AST SERPL-CCNC: 36 U/L — SIGNIFICANT CHANGE UP (ref 15–37)
BASOPHILS # BLD AUTO: 0.03 K/UL — SIGNIFICANT CHANGE UP (ref 0–0.2)
BASOPHILS NFR BLD AUTO: 0.5 % — SIGNIFICANT CHANGE UP (ref 0–2)
BILIRUB SERPL-MCNC: 0.4 MG/DL — SIGNIFICANT CHANGE UP (ref 0.2–1.2)
BUN SERPL-MCNC: 10 MG/DL — SIGNIFICANT CHANGE UP (ref 7–23)
CALCIUM SERPL-MCNC: 9.6 MG/DL — SIGNIFICANT CHANGE UP (ref 8.5–10.1)
CHLORIDE SERPL-SCNC: 101 MMOL/L — SIGNIFICANT CHANGE UP (ref 96–108)
CK MB BLD-MCNC: 1 % — SIGNIFICANT CHANGE UP (ref 0–3.5)
CK MB CFR SERPL CALC: 3.6 NG/ML — SIGNIFICANT CHANGE UP (ref 0–3.6)
CK SERPL-CCNC: 366 U/L — HIGH (ref 26–192)
CO2 SERPL-SCNC: 23 MMOL/L — SIGNIFICANT CHANGE UP (ref 22–31)
CREAT SERPL-MCNC: 1.1 MG/DL — SIGNIFICANT CHANGE UP (ref 0.5–1.3)
EGFR: 53 ML/MIN/1.73M2 — LOW
EOSINOPHIL # BLD AUTO: 0.01 K/UL — SIGNIFICANT CHANGE UP (ref 0–0.5)
EOSINOPHIL NFR BLD AUTO: 0.2 % — SIGNIFICANT CHANGE UP (ref 0–6)
GLUCOSE SERPL-MCNC: 123 MG/DL — HIGH (ref 70–99)
HCT VFR BLD CALC: 39.7 % — SIGNIFICANT CHANGE UP (ref 34.5–45)
HGB BLD-MCNC: 13.8 G/DL — SIGNIFICANT CHANGE UP (ref 11.5–15.5)
IMM GRANULOCYTES NFR BLD AUTO: 0.2 % — SIGNIFICANT CHANGE UP (ref 0–1.5)
INR BLD: 1.1 RATIO — SIGNIFICANT CHANGE UP (ref 0.88–1.16)
LYMPHOCYTES # BLD AUTO: 1.33 K/UL — SIGNIFICANT CHANGE UP (ref 1–3.3)
LYMPHOCYTES # BLD AUTO: 23.4 % — SIGNIFICANT CHANGE UP (ref 13–44)
MCHC RBC-ENTMCNC: 29.6 PG — SIGNIFICANT CHANGE UP (ref 27–34)
MCHC RBC-ENTMCNC: 34.8 GM/DL — SIGNIFICANT CHANGE UP (ref 32–36)
MCV RBC AUTO: 85.2 FL — SIGNIFICANT CHANGE UP (ref 80–100)
MONOCYTES # BLD AUTO: 1 K/UL — HIGH (ref 0–0.9)
MONOCYTES NFR BLD AUTO: 17.6 % — HIGH (ref 2–14)
NEUTROPHILS # BLD AUTO: 3.31 K/UL — SIGNIFICANT CHANGE UP (ref 1.8–7.4)
NEUTROPHILS NFR BLD AUTO: 58.1 % — SIGNIFICANT CHANGE UP (ref 43–77)
NRBC # BLD: 0 /100 WBCS — SIGNIFICANT CHANGE UP (ref 0–0)
PLATELET # BLD AUTO: 254 K/UL — SIGNIFICANT CHANGE UP (ref 150–400)
POTASSIUM SERPL-MCNC: 3.8 MMOL/L — SIGNIFICANT CHANGE UP (ref 3.5–5.3)
POTASSIUM SERPL-SCNC: 3.8 MMOL/L — SIGNIFICANT CHANGE UP (ref 3.5–5.3)
PROT SERPL-MCNC: 7.7 G/DL — SIGNIFICANT CHANGE UP (ref 6–8.3)
PROTHROM AB SERPL-ACNC: 12.9 SEC — SIGNIFICANT CHANGE UP (ref 10.5–13.4)
RBC # BLD: 4.66 M/UL — SIGNIFICANT CHANGE UP (ref 3.8–5.2)
RBC # FLD: 13.1 % — SIGNIFICANT CHANGE UP (ref 10.3–14.5)
SARS-COV-2 RNA SPEC QL NAA+PROBE: DETECTED
SODIUM SERPL-SCNC: 133 MMOL/L — LOW (ref 135–145)
TROPONIN I, HIGH SENSITIVITY RESULT: 15.1 NG/L — SIGNIFICANT CHANGE UP
TSH SERPL-MCNC: 1.87 UIU/ML — SIGNIFICANT CHANGE UP (ref 0.36–3.74)
WBC # BLD: 5.69 K/UL — SIGNIFICANT CHANGE UP (ref 3.8–10.5)
WBC # FLD AUTO: 5.69 K/UL — SIGNIFICANT CHANGE UP (ref 3.8–10.5)

## 2022-07-11 PROCEDURE — 76376 3D RENDER W/INTRP POSTPROCES: CPT | Mod: 26

## 2022-07-11 PROCEDURE — 99223 1ST HOSP IP/OBS HIGH 75: CPT

## 2022-07-11 PROCEDURE — 99221 1ST HOSP IP/OBS SF/LOW 40: CPT

## 2022-07-11 PROCEDURE — 72100 X-RAY EXAM L-S SPINE 2/3 VWS: CPT | Mod: 26

## 2022-07-11 PROCEDURE — 93010 ELECTROCARDIOGRAM REPORT: CPT

## 2022-07-11 PROCEDURE — 73502 X-RAY EXAM HIP UNI 2-3 VIEWS: CPT | Mod: 26,LT

## 2022-07-11 PROCEDURE — 71045 X-RAY EXAM CHEST 1 VIEW: CPT | Mod: 26

## 2022-07-11 PROCEDURE — 99285 EMERGENCY DEPT VISIT HI MDM: CPT | Mod: FS,CS

## 2022-07-11 PROCEDURE — 73610 X-RAY EXAM OF ANKLE: CPT | Mod: 26,LT

## 2022-07-11 PROCEDURE — 72192 CT PELVIS W/O DYE: CPT | Mod: 26

## 2022-07-11 RX ORDER — METOPROLOL TARTRATE 50 MG
2.5 TABLET ORAL ONCE
Refills: 0 | Status: COMPLETED | OUTPATIENT
Start: 2022-07-11 | End: 2022-07-11

## 2022-07-11 RX ORDER — ACETAMINOPHEN 500 MG
650 TABLET ORAL EVERY 6 HOURS
Refills: 0 | Status: DISCONTINUED | OUTPATIENT
Start: 2022-07-11 | End: 2022-07-18

## 2022-07-11 RX ORDER — DILTIAZEM HCL 120 MG
30 CAPSULE, EXT RELEASE 24 HR ORAL EVERY 6 HOURS
Refills: 0 | Status: DISCONTINUED | OUTPATIENT
Start: 2022-07-11 | End: 2022-07-13

## 2022-07-11 RX ORDER — ENOXAPARIN SODIUM 100 MG/ML
80 INJECTION SUBCUTANEOUS EVERY 12 HOURS
Refills: 0 | Status: DISCONTINUED | OUTPATIENT
Start: 2022-07-11 | End: 2022-07-15

## 2022-07-11 RX ORDER — METOPROLOL TARTRATE 50 MG
25 TABLET ORAL
Refills: 0 | Status: DISCONTINUED | OUTPATIENT
Start: 2022-07-11 | End: 2022-07-12

## 2022-07-11 RX ORDER — METOPROLOL TARTRATE 50 MG
25 TABLET ORAL
Refills: 0 | Status: DISCONTINUED | OUTPATIENT
Start: 2022-07-11 | End: 2022-07-11

## 2022-07-11 RX ORDER — DILTIAZEM HCL 120 MG
10 CAPSULE, EXT RELEASE 24 HR ORAL ONCE
Refills: 0 | Status: DISCONTINUED | OUTPATIENT
Start: 2022-07-11 | End: 2022-07-11

## 2022-07-11 RX ORDER — DILTIAZEM HCL 120 MG
30 CAPSULE, EXT RELEASE 24 HR ORAL ONCE
Refills: 0 | Status: COMPLETED | OUTPATIENT
Start: 2022-07-11 | End: 2022-07-11

## 2022-07-11 RX ORDER — DILTIAZEM HCL 120 MG
10 CAPSULE, EXT RELEASE 24 HR ORAL ONCE
Refills: 0 | Status: COMPLETED | OUTPATIENT
Start: 2022-07-11 | End: 2022-07-11

## 2022-07-11 RX ORDER — BEBTELOVIMAB 87.5 MG/ML
175 INJECTION, SOLUTION INTRAVENOUS ONCE
Refills: 0 | Status: COMPLETED | OUTPATIENT
Start: 2022-07-11 | End: 2022-07-11

## 2022-07-11 RX ORDER — TRAMADOL HYDROCHLORIDE 50 MG/1
25 TABLET ORAL EVERY 4 HOURS
Refills: 0 | Status: DISCONTINUED | OUTPATIENT
Start: 2022-07-11 | End: 2022-07-12

## 2022-07-11 RX ORDER — SODIUM CHLORIDE 9 MG/ML
1000 INJECTION INTRAMUSCULAR; INTRAVENOUS; SUBCUTANEOUS ONCE
Refills: 0 | Status: DISCONTINUED | OUTPATIENT
Start: 2022-07-11 | End: 2022-07-11

## 2022-07-11 RX ORDER — SODIUM CHLORIDE 9 MG/ML
500 INJECTION INTRAMUSCULAR; INTRAVENOUS; SUBCUTANEOUS ONCE
Refills: 0 | Status: COMPLETED | OUTPATIENT
Start: 2022-07-11 | End: 2022-07-11

## 2022-07-11 RX ADMIN — ENOXAPARIN SODIUM 80 MILLIGRAM(S): 100 INJECTION SUBCUTANEOUS at 20:59

## 2022-07-11 RX ADMIN — Medication 30 MILLIGRAM(S): at 20:58

## 2022-07-11 RX ADMIN — SODIUM CHLORIDE 500 MILLILITER(S): 9 INJECTION INTRAMUSCULAR; INTRAVENOUS; SUBCUTANEOUS at 11:04

## 2022-07-11 RX ADMIN — Medication 30 MILLIGRAM(S): at 13:27

## 2022-07-11 RX ADMIN — Medication 10 MILLIGRAM(S): at 13:28

## 2022-07-11 RX ADMIN — BEBTELOVIMAB 175 MILLIGRAM(S): 87.5 INJECTION, SOLUTION INTRAVENOUS at 21:26

## 2022-07-11 RX ADMIN — Medication 10 MILLIGRAM(S): at 11:37

## 2022-07-11 RX ADMIN — Medication 25 MILLIGRAM(S): at 20:59

## 2022-07-11 RX ADMIN — Medication 25 MILLIGRAM(S): at 15:20

## 2022-07-11 RX ADMIN — Medication 2.5 MILLIGRAM(S): at 15:20

## 2022-07-11 NOTE — CONSULT NOTE ADULT - NS ATTEND AMEND GEN_ALL_CORE FT
mech fall, and incidentally found to have af and covid  af with rvr, start on po cardizem  would start her on full dose ac  echocardiogram

## 2022-07-11 NOTE — ED ADULT NURSE REASSESSMENT NOTE - NS ED NURSE REASSESS COMMENT FT1
Pt received from ANMOL anand. Pt is aox4. Pt denies any pain at this time. Pt turned and repositioned. top dentures and eye glasses noted. Pt swallows pills whole with water. VSS on CM. will reassess.

## 2022-07-11 NOTE — CONSULT NOTE ADULT - SUBJECTIVE AND OBJECTIVE BOX
[facility]  INFECTIOUS DISEASES - Katie Ramos, [address], [place] [address]  Tel: [phone]     Fax: [phone]  =======================================================    [facility]-[id number]  VALDO GONZÁLES     CC: Patient is a 72y old  Female who presents with a chief complaint of fall  HPI:  72 year old female who presented with fall. Patient says she feels last Thursday. She denies any LOC but says her son has noted she has been unsteady on her feet recently. She developed a mild cough about 3 days ago but denies any fevers, chills, headache, SOB or chest pain. Received COVID vaccine x 2.    PAST MEDICAL & SURGICAL HISTORY:  HLD (hyperlipidemia)          Social Hx:     FAMILY HISTORY:      Allergies    No Known Allergies    Intolerances        Antibiotics:  MEDICATIONS  (STANDING):  bebtelovimab (EUA) Injectable 175 milliGRAM(s) IV Push once  diltiazem    Tablet 30 milliGRAM(s) Oral every 6 hours  enoxaparin Injectable 80 milliGRAM(s) SubCutaneous every 12 hours  metoprolol tartrate 25 milliGRAM(s) Oral four times a day    MEDICATIONS  (PRN):  acetaminophen     Tablet .. 650 milliGRAM(s) Oral every 6 hours PRN Mild Pain (1 - 3)  traMADol 25 milliGRAM(s) Oral every 4 hours PRN Moderate Pain (4 - 6)       REVIEW OF SYSTEMS:  CONSTITUTIONAL:  No Fever or chills  HEENT:  (+) sore throat, no runny nose.  CARDIOVASCULAR:  No chest pain or SOB.  RESPIRATORY:  No cough, shortness of breath  GASTROINTESTINAL:  No nausea, vomiting or diarrhea.  GENITOURINARY:  No dysuria, frequency or urgency.  NEUROLOGIC:  No pheadache  PSYCHIATRIC:  No disorder of thought or mood.    Physical Exam:  Vital Signs Last 24 Hrs  T(C): 37.3 (11 Jul 2022 17:00), Max: 37.3 (11 Jul 2022 17:00)  T(F): 99.2 (11 Jul 2022 17:00), Max: 99.2 (11 Jul 2022 17:00)  HR: 100 (11 Jul 2022 17:00) (81 - 145)  BP: 119/79 (11 Jul 2022 17:00) (75/59 - 125/71)  BP(mean): --  RR: 18 (11 Jul 2022 17:00) (17 - 18)  SpO2: 95% (11 Jul 2022 17:00) (95% - 99%)    Parameters below as of 11 Jul 2022 17:00  Patient On (Oxygen Delivery Method): room air      Height (cm): 162.6 (07-11 @ 10:15)  Weight (kg): 79.4 (07-11 @ 10:15)  BMI (kg/m2): 30 (07-11 @ 10:15)  BSA (m2): 1.85 (07-11 @ 10:15)  GEN: NAD  HEENT: normocephalic and atraumatic.  NECK: Supple.   LUNGS: Clear to auscultation.  HEART: irregular rhythm  ABDOMEN: Soft, nontender, and nondistended.  EXTREMITIES: no leg edema.  NEUROLOGIC: grossly intact.  PSYCHIATRIC: Appropriate affect .      Labs:  07-11    133<L>  |  101  |  10  ----------------------------<  123<H>  3.8   |  23  |  1.10    Ca    9.6      11 Jul 2022 10:50    TPro  7.7  /  Alb  4.2  /  TBili  0.4  /  DBili  x   /  AST  36  /  ALT  31  /  AlkPhos  85  07-11                          13.8   5.69  )-----------( 254      ( 11 Jul 2022 10:50 )             39.7     PT/INR - ( 11 Jul 2022 10:50 )   PT: 12.9 sec;   INR: 1.10 ratio         PTT - ( 11 Jul 2022 10:50 )  PTT:30.7 sec    LIVER FUNCTIONS - ( 11 Jul 2022 10:50 )  Alb: 4.2 g/dL / Pro: 7.7 g/dL / ALK PHOS: 85 U/L / ALT: 31 U/L / AST: 36 U/L / GGT: x           CARDIAC MARKERS ( 11 Jul 2022 10:50 )  x     / x     / 366 U/L / x     / 3.6 ng/mL              COVID-19 PCR: Detected (07-11-22 @ 11:35)      RECENT CULTURES:        All imaging and other data have been reviewed.

## 2022-07-11 NOTE — ED ADULT NURSE NOTE - NS ED NURSE REPORT GIVEN TO FT
Dermatology Patient Note  [address]  [address]  Dept: [phone]  Dept Fax: [phone]: [phone]      VISITDATE: 3/22/2021   REFERRING PROVIDER: PALMER Ward *      Jag Slaughter is a 42 y.o. female  who presents today in the office for:    Skin Lesion (on chest, back, and Rt upper arm) and New Patient      PERTINENT HISTORY NOT LISTED ABOVE:  Patient presents for evaluation of moles  - one is concerning on her chest and arm  - one on chest is very dark but she thinks unchanged    CURRENT MEDICATIONS:   Current Outpatient Medications   Medication Sig Dispense Refill    vitamin D3 (CHOLECALCIFEROL) 10 MCG (400 UNIT) TABS tablet Take 400 Units by mouth daily      JUNEL FE 24 1-20 MG-MCG(24) TABS       Multiple Vitamins-Minerals (MULTIVITAMIN ADULTS PO) Take by mouth daily       No current facility-administered medications for this visit. ALLERGIES:   No Known Allergies    SOCIAL HISTORY:  Social History     Tobacco Use    Smoking status: Never Smoker    Smokeless tobacco: Never Used   Substance Use Topics    Alcohol use: Never     Frequency: Never       Pertinent ROS:  Review of Systems  Skin: Denies any new changing, growing or bleeding lesions or rashes except as described in the HPI   Constitutional: Denies fevers, chills, and malaise. PHYSICAL EXAM:   /80   Pulse 98   Ht 5' 6\" (1.676 m)   Wt 219 lb 12.8 oz (99.7 kg)   SpO2 99%   BMI 35.48 kg/m²     The patient is generally well appearing, well nourished, alert and conversational. Affect is normal.    Cutaneous Exam:  Physical Exam  Total body skin exam, including head/face, neck, both arms, chest, back, abdomen, both legs, buttocks, digits and/or nails, was examined. Genital exam was deferred as patient denied having any lesions in this area.     Diagnoses/exam findings/medical history pertinent to this visit are listed below:    Assessment and Plan:  Assessment   1. Nevus r/o atypia x 2, left breast and lower back  Shave Biopsy x 2: The procedure and its risks were explained including but not limited to pain, bleeding, infection, permanent scar, permanent pigment alteration and need for an additional procedure. Consent to proceed with the procedure was obtained from the patient or the parent. After cleaning with alcohol the lesions were anesthetized with 1% lidocaine with epinephrine and removed with a dermablade. Hemostasis was achieved with aluminum chloride and Vaseline and a bandage were applied. 2. Multiple nevi  - Clinically and dermatoscopically benign on exam today. - Common nevi have a low individual risk of developing into melanoma. Patients with >50 nevi have a greater risk of developing melanoma in their lifetime and should undergo skin checks at least annually. - I recommended the patient apply broad spectrum spf 30+ sunscreen daily, reapplying every 2 hours  - In additional to regular use of sunscreen, I recommended broad-rimmed hats, long sleeves, and seeking the shade. 3. Actinic skin damage  - patient was counseled that UV-damaged skin increases lifetime risk for skin cancer  - I recommended the patient apply broad spectrum spf 30+ sunscreen daily, reapplying every 2 hours. - In additional to regular use of sunscreen, I recommended broad-rimmed hats, long sleeves, and seeking the shade. RTC 1 year, sooner pending path    Patient Instructions   BIOPSY WOUND CARE    A biopsy is where a small piece of skin tissue is removed and examined by a pathologist.  When a biopsy is done, there is a small wound site that requires proper care to prevent infection and scarring. Some biopsies require sutures and their removal.    How to Care for Biopsy Wound    A.  Leave band-aid or dressing on for 24 hours. B. Wash two times a day with soap and water.   C.  Let the wound air dry, then apply Vaseline ointment and cover with a Band-Aid       unless otherwise instructed by your provider. D. If there is slight discomfort, you may give acetaminophen or ibuprofen. When To Call the Doctor    Call the Dermatology Clinic or your doctor if any of the following occur:    A. Redness and swelling  B. Tenderness and warm to touch  C.  Drainage from wound  D. Fever    Biopsy Results    Biopsy results are usually available in 1-2 weeks. We provide biopsy results in letters for begin results or we will call for any concerning results. If you have not heard from our staff please call the office within 2 weeks. Please call our office with any concerns at [phone]. This note was created with the assistance of a speech-recognition program.  Although the intention is to generate a document that actually reflects the content of the visit, no guarantees can be provided that every mistake has been identified and corrected byediting.     Electronically signed by Lissette Peñaloza MD on 3/22/21 at 1:23 PM EDT [facility]

## 2022-07-11 NOTE — ED PROVIDER NOTE - PHYSICAL EXAMINATION
Constitutional: Awake, Alert, non-toxic. NAD  HEAD: Normocephalic, atraumatic.   EYES: PERRL, EOM intact, conjunctiva and sclera are clear bilaterally. No raccoon eyes.   ENT: no nunez sign. No rhinorrhea, normal pharynx, patent, no tonsillar exudate or enlargement, mucous membranes pink/moist, no erythema, no drooling or stridor.   NECK: Supple, non-tender  BACK: No midline or paraspinal TTP of cervical/thoracic/lumbar spine, FROM. No ecchymosis or hematomas. no rib TTP.   CARDIOVASCULAR: Normal S1, S2; (+) irregular rate and rhythm.  RESPIRATORY: Normal respiratory effort; breath sounds CTAB, no wheezes, rhonchi, or rales. Speaking in full sentences. No accessory muscle use.   ABDOMEN: Soft; non-tender, non-distended.   EXTREMITIES:  (+) left hip pain with active ROM, no pain with passive ROM, (+) left lateral ankle TTP and swelling, hip non-tender to palpation; distal pulses palpable and symmetric, no edema, no crepitus or step off  SKIN: Warm, dry; good skin turgor, no apparent lesions or rashes, no ecchymosis, brisk capillary refill.  NEURO: A&O x3. Sensory and motor functions are grossly intact. Speech is normal. Appearance and judgement seem appropriate for gender and age. No neurological deficits. Neurovascular sensation intact motor function 5/5 of upper and lower extremities, CN II-XII grossly intact, no ataxia, absent pronator drift, intact cerebellar function. Speech clear, without articulation or word-finding difficulties. Eyes- PERRL bilaterally. EOMs in tact. No nystagmus. No facial droop.

## 2022-07-11 NOTE — ED PROVIDER NOTE - PROGRESS NOTE DETAILS
pt found to be hypotensive in triage. EKG noted A Fib, BP repeated noted 120 systolic. Pt without chest pain. Cardio Savella made aware. Discussed with MD A Fib with RVR, will provide IVF then re-evaluate for potential meds. pt improved p cardizem 10mg ivp x 1.   HR about 100. Dr. moe accepting admission

## 2022-07-11 NOTE — ED PROVIDER NOTE - CARE PLAN
1 Principal Discharge DX:	Fall  Secondary Diagnosis:	Hip pain  Secondary Diagnosis:	Atrial fibrillation  Secondary Diagnosis:	COVID-19   Principal Discharge DX:	Hip pain  Secondary Diagnosis:	Hip pain  Secondary Diagnosis:	Atrial fibrillation  Secondary Diagnosis:	COVID-19

## 2022-07-11 NOTE — ED PROVIDER NOTE - OBJECTIVE STATEMENT
73 y/o female with PMHx HLD BIB son due to fall. Pt reports she fell yesterday off the couch in which she couldn't get up for 5 hours due to left hip pain. Reports injury to left ankle and hip, aching, non-radiating, and currently 6/10. pt reports generalized weakness. pt admits to mild lower back pain. pt denies fever, head injury, blood thinning medication chest pain, vomiting, abd pain, numbness/weakness, incontinence, dizziness, palpitations, or any other complaints.

## 2022-07-11 NOTE — ED PROVIDER NOTE - CLINICAL SUMMARY MEDICAL DECISION MAKING FREE TEXT BOX
DT: I have personally performed a face to face diagnostic evaluation on this patient.  I have reviewed the PA's note and agree with the history, exam, and plan of care, except as noted.  History and Exam by me shows 73 y/o female with PMHx HLD BIB son due to fall. Pt reports she fell yesterday off the couch in which she couldn't get up for 5 hours due to left hip pain. Reports injury to left ankle and hip, aching, non-radiating, and currently 6/10. pt reports generalized weakness. pt admits to mild lower back pain. pt denies fever, head injury, blood thinning medication chest pain, vomiting, abd pain, numbness/weakness, incontinence, dizziness, palpitations, or any other complaints. .  Patient is NAD.  A n O x 3. Head NC/AT. Lungs cta bl. Heart s1,s2, irregularly irregular, no murmurs. Abd-soft, nt, no guarding, no rebound, no distension, no cva tenderness. Ext- Limited rom left hip due to pain, nt, no swelling or erythema.  Labs and cxr were unremarkable.  Cardio consult. Admit for afib c rapid rate and left hip pain.

## 2022-07-11 NOTE — ED ADULT TRIAGE NOTE - CHIEF COMPLAINT QUOTE
S/p fall yesterday off couch - patient states she was on floor for about 4 hours. C.o back pain, left sided hip and knee pain. Patient hypotensive 75/59

## 2022-07-11 NOTE — CONSULT NOTE ADULT - ASSESSMENT
Assessment/Plan:  This is a 71 y/o F with HLD presented to the ED after she was found on the floor today with incidental finding of COVID Pna and new onset of Afib with RVR    New Onset of Afib/s/p Fall  - No known Hx of Afib.  EKG/tele showed RVR at 120's, likely, in the setting of pulmonary infection/dehydration  - Okay to give hydration  - Start Cardizem 60 mg q8H.  If persistently tachy>120, may give Cardizem 15 mg IV x 1 prior to PO  - [id number] score=2.  Discussed re: need for AC.  Risk vs benefits discussed.  States, she is not sure if she would take it long-term.  She lives alone and has 17 flights of stairs at home.  For now, would start FD Lovenox and will readdress in the near future  - Monitor electrolytes, replete to keep K>4 and Mag>2  - Obtain TTE  - Remote tele   - TSH normal  - CPK's slightly elevated but normal hsT.  Can continue to trend to r/o Rhabdo  - No anginal complaints.  Would repeat EKG when HR slows down  - No evidence of volume overload.  - Fall appears to be mechanical, not a true fall.  No syncope  - Follow up labs and CxR    Will continue to follow    Keena Gibbs DNP, NP-C  Cardiology  Spectra #[id number]       Assessment/Plan:  This is a 71 y/o F with HLD presented to the ED after she was found on the floor today with incidental finding of COVID Pna and new onset of Afib with RVR    New Onset of Afib/s/p Fall  - No known Hx of Afib.  EKG/tele showed RVR at 120's, likely, in the setting of pulmonary infection/dehydration  - Okay to give hydration.  s/p IV bolus of 1.5L NS  - S/p Cardizem 10 mg IV x 2.  Start Cardizem 60 mg q8H, uptitrate as needed and as BP tolerates  - [id number] score=2.  Discussed re: need for AC.  Risk vs benefits discussed.  States, she is not sure if she would take it long-term.  She lives alone and has 17 flights of stairs at home.  For now, would start FD Lovenox and will readdress in the near future  - Monitor electrolytes, replete to keep K>4 and Mag>2  - Obtain TTE  - Remote tele   - TSH normal  - CPK's slightly elevated but normal hsT.  Can continue to trend to r/o Rhabdo  - No anginal complaints.  Would repeat EKG when HR slows down  - No evidence of volume overload.  - Fall appears to be mechanical, not a true fall.  No syncope  - Follow up labs and CxR    Will continue to follow    Keena Gibbs DNP, NP-C  Cardiology  Spectra #[id number]       Assessment/Plan:  This is a 73 y/o F with HLD presented to the ED after she was found on the floor today with incidental finding of COVID Pna and new onset of Afib with RVR    New Onset of Afib/s/p Fall  - No known Hx of Afib.  EKG/tele showed RVR at 120's, likely, in the setting of pulmonary infection/dehydration  - Okay to give hydration.  s/p IV bolus of 1.5L NS  - S/p Cardizem 10 mg IV x 2. Start Cardizem 60 mg q8H, uptitrate as needed and as BP tolerates  - [id number] score= 2.  Discussed re: need for AC.  Risk vs benefits discussed.  States, she is not sure if she would take it long-term.  She lives alone and has 17 flights of stairs at home.  For now, would start FD Lovenox and will readdress in the near future  - Monitor electrolytes, replete to keep K>4 and Mag>2  - Obtain TTE  - Remote tele   - TSH normal  - CPK's slightly elevated but normal hsT.  Can continue to trend to r/o Rhabdo  - No anginal complaints.  Would repeat EKG when HR slows down  - No evidence of volume overload.  - Fall appears to be mechanical, not a true fall.  No syncope  - Follow up labs and CxR    Will continue to follow    Keena Gibbs DNP, NP-C  Cardiology  Spectra #[phone]

## 2022-07-11 NOTE — CONSULT NOTE ADULT - SUBJECTIVE AND OBJECTIVE BOX
[facility] - Velma Benitez, Cherelle, Bernie, Dora, Alfredo, Kesha Cheatham  Office Number: [phone]    Initial Consult Note:  This is a 71 y/o F with HLD presented to the ED after she was found on the floor today with incidental finding of COVID Pna and new onset of Afib with RVR    CHIEF COMPLAINT: Patient is a 72y old  Female who presents with a chief complaint of     HPI:  This is a 71 y/o F with HLD presented to the ED after she was found on the floor by her son this morning.  Patient is awake and alert on evaluation.  States, she was sitting on a leather cough last night.  She spilled off the cough in an attempt to get up.  However, stayed on the floor for the rest of the night as she was not able ot get up.  Admits to dizziness prior to the fall but denies syncope.  Admits to have fallen in the past but denies syncope.  Denies SOB, HAN, orthopnea, chest pain or palpitations.  Denies fever or chills but though, she felt warm 2 days ago.  Admits to dry cough but denies N/V, abdominal pain, diarrhea/constipation, sick contact or recent travel.      In the ED, she was found to be COVID + as well as in new Afib with RVR, 120's.  EKG showed ST depressions in lateral leads.  Patient asymptomatic.  Of note, patient has had 2 doses of Moderna vaccine but states, would not take the booster shots.     PAST MEDICAL & SURGICAL HISTORY:  HLD (hyperlipidemia)    SOCIAL HISTORY:  No tobacco, ethanol, or drug abuse.  FAMILY HISTORY:    No family history of acute MI or sudden cardiac death.  MEDICATIONS  (STANDING):    MEDICATIONS  (PRN):    Allergies    No Known Allergies    Intolerances    REVIEW OF SYSTEMS:  CONSTITUTIONAL: No weakness, fevers or chills  EYES/ENT: No visual changes;  No vertigo or throat pain   NECK: No pain or stiffness  RESPIRATORY: No cough, wheezing, hemoptysis; No shortness of breath  CARDIOVASCULAR: No chest pain or palpitations  GASTROINTESTINAL: No abdominal pain. No nausea, vomiting, or hematemesis; No diarrhea or constipation. No melena or hematochezia.  GENITOURINARY: No dysuria, frequency or hematuria  NEUROLOGICAL: No numbness or weakness  SKIN: No itching or rash  All other review of systems is negative unless indicated above  VITAL SIGNS:   Vital Signs Last 24 Hrs  T(C): 35.6 (11 Jul 2022 10:15), Max: 35.6 (11 Jul 2022 10:15)  T(F): 96 (11 Jul 2022 10:15), Max: 96 (11 Jul 2022 10:15)  HR: 117 (11 Jul 2022 11:42) (81 - 145)  BP: 124/70 (11 Jul 2022 11:42) (75/59 - 125/71)  BP(mean): --  RR: 18 (11 Jul 2022 10:15) (18 - 18)  SpO2: 95% (11 Jul 2022 10:15) (95% - 95%)    Parameters below as of 11 Jul 2022 10:15  Patient On (Oxygen Delivery Method): room air    I&O's Summary    On Exam:  Constitutional: NAD, alert and oriented x 3. Obese  Lungs:  Non-labored, breath sounds are clear but diminished bilaterally, No wheezing, rales or rhonchi  Cardiovascular: IRRR.  S1 and S2 positive.  No murmurs, rubs, gallops or clicks  Gastrointestinal: Bowel Sounds present, soft, nontender.   Lymph: No peripheral edema. No cervical lymphadenopathy.  Neurological: Alert, no focal deficits  Skin: No rashes or ulcers   Psych:  Mood & affect appropriate.    LABS: All Labs Reviewed:                        13.8   5.69  )-----------( 254      ( 11 Jul 2022 10:50 )             39.7     11 Jul 2022 10:50    133    |  101    |  10     ----------------------------<  123    3.8     |  23     |  1.10     Ca    9.6        11 Jul 2022 10:50    TPro  7.7    /  Alb  4.2    /  TBili  0.4    /  DBili  x      /  AST  36     /  ALT  31     /  AlkPhos  85     11 Jul 2022 10:50    PT/INR - ( 11 Jul 2022 10:50 )   PT: 12.9 sec;   INR: 1.10 ratio       PTT - ( 11 Jul 2022 10:50 )  PTT:30.7 sec  CARDIAC MARKERS ( 11 Jul 2022 10:50 )  x     / x     / 366 U/L / x     / 3.6 ng/mL    Blood Culture:     07-11 @ 10:50  TSH: 1.87    RADIOLOGY:    EKG: Afib with RVR, ST depressions in lateral leads

## 2022-07-11 NOTE — CONSULT NOTE ADULT - ASSESSMENT
72 year old female who presented with fall. Found to have new onset Afib as well as COVID positive. She has no fevers or leukocytosis. She is on room air. No indication for steroids.    Discussed options with son Hema over the phone per patient preference, and he says he would prefer monoclonal antibody infusion over remdesivir. Patient consents to treatment preferred by son. Consent obtained and consent form signed.     -suggest bebtelovimab  -AC per primary team/Cardiology    Thank you for courtesy of this consult.     Will follow.  Discussed with the primary team.     Felipa Mi MD  Division of Infectious Diseases   Cell [phone] between 8am and 6pm   After 6pm and weekends please call ID service at [phone].

## 2022-07-12 ENCOUNTER — TRANSCRIPTION ENCOUNTER (OUTPATIENT)
Age: 72
End: 2022-07-12

## 2022-07-12 PROCEDURE — 76856 US EXAM PELVIC COMPLETE: CPT | Mod: 26

## 2022-07-12 PROCEDURE — 99497 ADVNCD CARE PLAN 30 MIN: CPT

## 2022-07-12 PROCEDURE — 99232 SBSQ HOSP IP/OBS MODERATE 35: CPT

## 2022-07-12 RX ORDER — LIDOCAINE 4 G/100G
0 CREAM TOPICAL
Qty: 0 | Refills: 0 | DISCHARGE
Start: 2022-07-12

## 2022-07-12 RX ORDER — LIDOCAINE 4 G/100G
1 CREAM TOPICAL DAILY
Refills: 0 | Status: DISCONTINUED | OUTPATIENT
Start: 2022-07-12 | End: 2022-07-18

## 2022-07-12 RX ORDER — METOPROLOL TARTRATE 50 MG
25 TABLET ORAL THREE TIMES A DAY
Refills: 0 | Status: DISCONTINUED | OUTPATIENT
Start: 2022-07-12 | End: 2022-07-14

## 2022-07-12 RX ORDER — ASPIRIN/CALCIUM CARB/MAGNESIUM 324 MG
1 TABLET ORAL
Qty: 0 | Refills: 0 | DISCHARGE

## 2022-07-12 RX ORDER — ACETAMINOPHEN 500 MG
2 TABLET ORAL
Qty: 0 | Refills: 0 | DISCHARGE
Start: 2022-07-12

## 2022-07-12 RX ORDER — SIMVASTATIN 20 MG/1
1 TABLET, FILM COATED ORAL
Qty: 0 | Refills: 0 | DISCHARGE

## 2022-07-12 RX ORDER — TRAMADOL HYDROCHLORIDE 50 MG/1
0.5 TABLET ORAL
Qty: 0 | Refills: 0 | DISCHARGE
Start: 2022-07-12

## 2022-07-12 RX ADMIN — Medication 25 MILLIGRAM(S): at 01:21

## 2022-07-12 RX ADMIN — Medication 30 MILLIGRAM(S): at 17:24

## 2022-07-12 RX ADMIN — LIDOCAINE 1 PATCH: 4 CREAM TOPICAL at 19:38

## 2022-07-12 RX ADMIN — LIDOCAINE 1 PATCH: 4 CREAM TOPICAL at 17:24

## 2022-07-12 RX ADMIN — ENOXAPARIN SODIUM 80 MILLIGRAM(S): 100 INJECTION SUBCUTANEOUS at 17:24

## 2022-07-12 RX ADMIN — Medication 30 MILLIGRAM(S): at 13:34

## 2022-07-12 RX ADMIN — TRAMADOL HYDROCHLORIDE 25 MILLIGRAM(S): 50 TABLET ORAL at 19:37

## 2022-07-12 RX ADMIN — Medication 25 MILLIGRAM(S): at 05:35

## 2022-07-12 RX ADMIN — Medication 25 MILLIGRAM(S): at 22:07

## 2022-07-12 RX ADMIN — Medication 30 MILLIGRAM(S): at 05:36

## 2022-07-12 RX ADMIN — ENOXAPARIN SODIUM 80 MILLIGRAM(S): 100 INJECTION SUBCUTANEOUS at 05:35

## 2022-07-12 RX ADMIN — Medication 30 MILLIGRAM(S): at 01:21

## 2022-07-12 RX ADMIN — Medication 25 MILLIGRAM(S): at 13:36

## 2022-07-12 RX ADMIN — TRAMADOL HYDROCHLORIDE 25 MILLIGRAM(S): 50 TABLET ORAL at 18:38

## 2022-07-12 NOTE — DISCHARGE NOTE PROVIDER - NSDCFUSCHEDAPPT_GEN_ALL_CORE_FT
[facility]  ORTHOSURG [address] [place] [address] S  Scheduled Appointment: 07/13/2022

## 2022-07-12 NOTE — PATIENT PROFILE ADULT - FALL HARM RISK - HARM RISK INTERVENTIONS

## 2022-07-12 NOTE — DISCHARGE NOTE PROVIDER - HOSPITAL COURSE
admitted for new onset AF  rate control with cardizem and beta blocker  AC with lovenox  Sacral fx - non surgical  Right adnexal mass - out patient GYN follow up  DC after cardio and PT clearance

## 2022-07-12 NOTE — PATIENT PROFILE ADULT - HEALTH LITERACY
"Patient C/O left hand pain. 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 He reports increasing pain X 3 days. CMS intact. He also C/O intermittent chest pain and fatigue X 3 days. Patient currently C/O 1/10 chest \"tightness.\" Patient denies SOB, diaphoresis, N/V.  " no

## 2022-07-12 NOTE — GOALS OF CARE CONVERSATION - ADVANCED CARE PLANNING - CONVERSATION DETAILS
Writer met with pt in ED. Reviewed patient's medical and social history as well as events leading to patient's hospitalization. Writer discussed patient's current diagnosis (Covid, HLD, A/F w RVR), medical condition and management, prognosis, and life expectancy. Inquired about patient's wishes regarding extent of medical care to be provided including escalation of medical care into the ICU and use of vasopressor support. In addition, the writer inquired about thoughts regarding cardiopulmonary resuscitation, artificial nutrition and hydration including use of feeding tubes and IVF, antibiotics, and further investigative studies such as blood draws and radiology. Pt  showed good insight into medical condition. All questions answered. Pt wants resuscitation,and her son knows her wishes. Psychosocial support provided.

## 2022-07-12 NOTE — PROGRESS NOTE ADULT - SUBJECTIVE AND OBJECTIVE BOX
[facility] -- Velma Benitez, Cherelle, Bernie, Alfredo John Savella, Goodger  Office # [phone]    Follow Up:    s/p fall to floor found to have a fracture of sacrum at S3-S4 level,  new afib with RVR, and covid  Subjective/Observations:   Patient seen and examined. She has no complaints of chest pain or SOB. CT of pelvis reveals an S3-S4 fracture. Patient did not offer much conversation.     REVIEW OF SYSTEMS: All other review of systems is negative unless indicated above  PAST MEDICAL & SURGICAL HISTORY:  HLD (hyperlipidemia)    MEDICATIONS  (STANDING):  diltiazem    Tablet 30 milliGRAM(s) Oral every 6 hours  enoxaparin Injectable 80 milliGRAM(s) SubCutaneous every 12 hours  metoprolol tartrate 25 milliGRAM(s) Oral four times a day    MEDICATIONS  (PRN):  acetaminophen     Tablet .. 650 milliGRAM(s) Oral every 6 hours PRN Mild Pain (1 - 3)  traMADol 25 milliGRAM(s) Oral every 4 hours PRN Moderate Pain (4 - 6)    Allergies    No Known Allergies    Intolerances      Vital Signs Last 24 Hrs  T(C): 37.2 (12 Jul 2022 07:47), Max: 37.8 (12 Jul 2022 01:20)  T(F): 99 (12 Jul 2022 07:47), Max: 100.1 (12 Jul 2022 01:20)  HR: 62 (12 Jul 2022 07:47) (62 - 145)  BP: 115/- (12 Jul 2022 07:47) (109/59 - 160/67)  BP(mean): --  RR: 19 (12 Jul 2022 07:47) (16 - 19)  SpO2: 98% (12 Jul 2022 07:47) (94% - 99%)    Parameters below as of 12 Jul 2022 07:47  Patient On (Oxygen Delivery Method): room air      I&O's Summary      PHYSICAL EXAM:  TELE: atrial fibrillation  Constitutional: NAD, awake and alert, well-developed  HEENT: Moist Mucous Membranes, Anicteric  Pulmonary: Non-labored, breath sounds are clear bilaterally, No wheezing, rales or rhonchi  Cardiovascular: irregular rate and rhythm  Gastrointestinal: Bowel Sounds present, soft, nontender.   Lymph: No peripheral edema. No lymphadenopathy.  Skin: No visible rashes or ulcers.  Psych:  Mood & affect appropriate  LABS: All Labs Reviewed:                        13.8   5.69  )-----------( 254      ( 11 Jul 2022 10:50 )             39.7     11 Jul 2022 10:50    133    |  101    |  10     ----------------------------<  123    3.8     |  23     |  1.10     Ca    9.6        11 Jul 2022 10:50    TPro  7.7    /  Alb  4.2    /  TBili  0.4    /  DBili  x      /  AST  36     /  ALT  31     /  AlkPhos  85     11 Jul 2022 10:50    PT/INR - ( 11 Jul 2022 10:50 )   PT: 12.9 sec;   INR: 1.10 ratio         PTT - ( 11 Jul 2022 10:50 )  PTT:30.7 sec  CARDIAC MARKERS ( 11 Jul 2022 10:50 )  x     / x     / 366 U/L / x     / 3.6 ng/mL  < from: CT Pelvis No Cont (07.11.22 @ 16:02) >    IMPRESSION: Subacute appearing transversely oriented fracture of the   sacrum at the S3-4 level. MRI can be performed to better age the fracture   if indicated.    5.5 x 4.8 cm soft tissue density mass in the right adnexa which may   represent an adnexal mass or exophytic fibroid. Ultrasound is recommended   to further evaluate.        --- End of Report ---            RUTH QUINTEROS MD; Attending Radiologist  This document has been electronically signed. Jul 11 2022  4:31PM    < end of copied text >

## 2022-07-12 NOTE — PATIENT PROFILE ADULT - NSPROSPHOSPCHAPLAINYN_GEN_A_NUR
*1514 Pt received in PACU , accompanied by RN and Anesthesia - report received and care assumed- monitors on - Pt arousable to name - denies pain at present. Eyes closed left eye sl bloody drainage noted - oint on both eyes - updated   1520 - pt states she is hot - sweaty - cool clothe applied to forehead - refusing po's at present  1530-  at bedside, pt dozing on and off   1600 - pt states pain 3/10, c/o nausea - see MAR  1615 - report given to Harmony EVANS   no

## 2022-07-12 NOTE — DISCHARGE NOTE PROVIDER - PROVIDER TOKENS
PROVIDER:[TOKEN:[[id number]]],PROVIDER:[TOKEN:[[id number]]],PROVIDER:[TOKEN:[[id number]]],PROVIDER:[TOKEN:[[id number]]]

## 2022-07-12 NOTE — DISCHARGE NOTE PROVIDER - NSDCMRMEDTOKEN_GEN_ALL_CORE_FT
acetaminophen 325 mg oral tablet: 2 tab(s) orally every 6 hours, As needed, Mild Pain (1 - 3)  aspirin 81 mg oral delayed release tablet: 1 tab(s) orally once a day (in the evening)  lidocaine 4% topical film: Apply topically to affected area once a day  Multiple Vitamins oral tablet: 1 tab(s) orally once a day  simvastatin 20 mg oral tablet: 1 tab(s) orally once a day (at bedtime)  traMADol 50 mg oral tablet: 0.5 tab(s) orally every 4 hours, As needed, Moderate Pain (4 - 6)   acetaminophen 325 mg oral tablet: 2 tab(s) orally every 6 hours, As needed, Mild Pain (1 - 3)  albuterol 90 mcg/inh inhalation aerosol: 2 puff(s) inhaled every 6 hours, As needed, Shortness of Breath and/or Wheezing  apixaban 5 mg oral tablet: 1 tab(s) orally every 12 hours  aspirin 81 mg oral delayed release tablet: 1 tab(s) orally once a day (in the evening)  dilTIAZem 120 mg/24 hours oral capsule, extended release: 1 cap(s) orally once a day  lidocaine 4% topical film: Apply topically to affected area once a day  metoprolol succinate 50 mg oral tablet, extended release: 1 tab(s) orally once a day  Multiple Vitamins oral tablet: 1 tab(s) orally once a day  simvastatin 20 mg oral tablet: 1 tab(s) orally once a day (at bedtime)  traMADol 50 mg oral tablet: 0.5 tab(s) orally every 4 hours, As needed, Moderate Pain (4 - 6)   acetaminophen 325 mg oral tablet: 2 tab(s) orally every 6 hours, As needed, Mild Pain (1 - 3)  albuterol 90 mcg/inh inhalation aerosol: 2 puff(s) inhaled every 6 hours, As needed, Shortness of Breath and/or Wheezing  apixaban 5 mg oral tablet: 1 tab(s) orally every 12 hours  aspirin 81 mg oral delayed release tablet: 1 tab(s) orally once a day (in the evening)  dilTIAZem 120 mg/24 hours oral capsule, extended release: 1 cap(s) orally once a day  guaiFENesin 100 mg/5 mL oral liquid: 5 milliliter(s) orally every 6 hours, As needed, Cough  lidocaine 4% topical film: Apply topically to affected area once a day  metoprolol succinate 50 mg oral tablet, extended release: 1 tab(s) orally once a day  Multiple Vitamins oral tablet: 1 tab(s) orally once a day  simvastatin 20 mg oral tablet: 1 tab(s) orally once a day (at bedtime)  traMADol 50 mg oral tablet: 0.5 tab(s) orally every 4 hours, As needed, Moderate Pain (4 - 6)

## 2022-07-12 NOTE — DISCHARGE NOTE PROVIDER - CARE PROVIDERS DIRECT ADDRESSES
,DirectAddress_Unknown,DirectAddress_Unknown,[email or web address]

## 2022-07-12 NOTE — PROGRESS NOTE ADULT - ASSESSMENT
69 year old female with PMHx of HLD, family Hx of heart disease was evaluated for a cardiology work up. Underwent ECHO EF 65-70%, Carotid duplex and nuclear stress test. Nuclear stress test suggestive of LAD ischemia. Referred for cardiac cath with possible PCI     ASA class:II  Creatinine:0.8  GFR:  Bleeding  Risk score: 1.7%

## 2022-07-12 NOTE — ED PEDIATRIC NURSE REASSESSMENT NOTE - NS ED NURSE REASSESS COMMENT FT2
Report received from prior RN; pt is aox4 speaking in full sentences, breathing evenly and unlabored. Denies any pain at this time. Denies dizziness/HA/SOB/chest pain or palpitations. Pt in rate-controlled a-fib on monitor. Awaiting further orders/disposition. Will continue to monitor. RENE Hilliard RN

## 2022-07-12 NOTE — PROGRESS NOTE ADULT - SUBJECTIVE AND OBJECTIVE BOX
Patient is a 72y old  Female who presents with a chief complaint of found on the floor, incidentally is COVID +, new afib with RVR (12 Jul 2022 10:58)      INTERVAL /OVERNIGHT EVENTS: still with back pain    MEDICATIONS  (STANDING):  diltiazem    Tablet 30 milliGRAM(s) Oral every 6 hours  enoxaparin Injectable 80 milliGRAM(s) SubCutaneous every 12 hours  metoprolol tartrate 25 milliGRAM(s) Oral four times a day    MEDICATIONS  (PRN):  acetaminophen     Tablet .. 650 milliGRAM(s) Oral every 6 hours PRN Mild Pain (1 - 3)  traMADol 25 milliGRAM(s) Oral every 4 hours PRN Moderate Pain (4 - 6)      Allergies    No Known Allergies    Intolerances        REVIEW OF SYSTEMS:  CONSTITUTIONAL: No fever, weight loss, or fatigue  EYES: No eye pain, visual disturbances, or discharge  ENMT:  No difficulty hearing, tinnitus, vertigo; No sinus or throat pain  NECK: No pain or stiffness  RESPIRATORY: No cough, wheezing, chills or hemoptysis; No shortness of breath  CARDIOVASCULAR: No chest pain, palpitations, dizziness, or leg swelling  GASTROINTESTINAL: No abdominal or epigastric pain. No nausea, vomiting, or hematemesis; No diarrhea or constipation. No melena or hematochezia.  GENITOURINARY: No dysuria, frequency, hematuria, or incontinence  NEUROLOGICAL: No headaches, memory loss, loss of strength, numbness, or tremors  SKIN: No itching, burning, rashes, or lesions   LYMPH NODES: No enlarged glands  ENDOCRINE: No heat or cold intolerance; No hair loss; No polydipsia or polyuria  MUSCULOSKELETAL: No joint pain or swelling; + back pain  PSYCHIATRIC: No depression, anxiety, mood swings, or difficulty sleeping  HEME/LYMPH: No easy bruising, or bleeding gums  ALLERGY AND IMMUNOLOGIC: No hives or eczema    Vital Signs Last 24 Hrs  T(C): 37.3 (12 Jul 2022 12:07), Max: 37.8 (12 Jul 2022 01:20)  T(F): 99.1 (12 Jul 2022 12:07), Max: 100.1 (12 Jul 2022 01:20)  HR: 87 (12 Jul 2022 12:07) (62 - 121)  BP: 144/87 (12 Jul 2022 12:07) (109/59 - 160/67)  BP(mean): --  RR: 15 (12 Jul 2022 12:07) (15 - 19)  SpO2: 96% (12 Jul 2022 12:07) (94% - 99%)    Parameters below as of 12 Jul 2022 12:07  Patient On (Oxygen Delivery Method): room air        PHYSICAL EXAM:  GENERAL: NAD, well-groomed, well-developed  HEAD:  Atraumatic, Normocephalic  EYES: EOMI, PERRLA, conjunctiva and sclera clear  ENMT: No tonsillar erythema, exudates, or enlargement; Moist mucous membranes, Good dentition, No lesions  NECK: Supple, No JVD, Normal thyroid  NERVOUS SYSTEM:  Alert & Oriented X3, Good concentration; Motor Strength 5/5 B/L upper and lower extremities; DTRs 2+ intact and symmetric  CHEST/LUNG: Clear to auscultation bilaterally; No rales, rhonchi, wheezing, or rubs  HEART: Regular rate and rhythm; No murmurs, rubs, or gallops  ABDOMEN: Soft, Nontender, Nondistended; Bowel sounds present  EXTREMITIES:  2+ Peripheral Pulses, No clubbing, cyanosis, or edema  LYMPH: No lymphadenopathy noted  SKIN: No rashes or lesions    LABS:      Ca    9.6        11 Jul 2022 10:50      PT/INR - ( 11 Jul 2022 10:50 )   PT: 12.9 sec;   INR: 1.10 ratio         PTT - ( 11 Jul 2022 10:50 )  PTT:30.7 sec    CAPILLARY BLOOD GLUCOSE          RADIOLOGY & ADDITIONAL TESTS:    Notes Reviewed:  [x ] YES  [ ] NO    Care Discussed with Consultants/Other Providers [x ] YES  [ ] NO

## 2022-07-12 NOTE — DISCHARGE NOTE PROVIDER - NSDCCPCAREPLAN_GEN_ALL_CORE_FT
PRINCIPAL DISCHARGE DIAGNOSIS  Diagnosis: Fall  Assessment and Plan of Treatment: follow up with rehab MD      SECONDARY DISCHARGE DIAGNOSES  Diagnosis: Hip pain  Assessment and Plan of Treatment:     Diagnosis: Atrial fibrillation  Assessment and Plan of Treatment:     Diagnosis: COVID-19  Assessment and Plan of Treatment:

## 2022-07-12 NOTE — CONSULT NOTE ADULT - SUBJECTIVE AND OBJECTIVE BOX
Pt was seen and examined at bedside for back pain that began after a fall on Thursday. She went to the ED today because her pain has been consistent and she and her son were concerned for COVID. She states that she was on a couch and slipped off and landed on the floor. She denies any head trauma or LOC. She denies any radiating pain to the limbs, fever, or chills. She states that has a history of urinary incontinence before the fall, but no changes since the fall. Also endorses mild b/l groin pain since the fall, but otherwise no other orthopedic complaints.    PAST MEDICAL & SURGICAL HISTORY:  HLD (hyperlipidemia)      HLD (hyperlipidemia)        Vital Signs Last 24 Hrs  T(C): 36.9 (12 Jul 2022 11:05), Max: 37.8 (12 Jul 2022 01:20)  T(F): 98.5 (12 Jul 2022 11:05), Max: 100.1 (12 Jul 2022 01:20)  HR: 64 (12 Jul 2022 11:05) (62 - 121)  BP: 125/66 (12 Jul 2022 11:05) (109/59 - 160/67)  RR: 16 (12 Jul 2022 11:05) (16 - 19)  SpO2: 99% (12 Jul 2022 11:05) (94% - 99%)      LABS:                        13.8   5.69  )-----------( 254      ( 11 Jul 2022 10:50 )             39.7     07-11    133<L>  |  101  |  10  ----------------------------<  123<H>  3.8   |  23  |  1.10    Ca    9.6      11 Jul 2022 10:50    TPro  7.7  /  Alb  4.2  /  TBili  0.4  /  DBili  x   /  AST  36  /  ALT  31  /  AlkPhos  85  07-11    PT/INR - ( 11 Jul 2022 10:50 )   PT: 12.9 sec;   INR: 1.10 ratio         PTT - ( 11 Jul 2022 10:50 )  PTT:30.7 sec      PHYSICAL EXAM:  General; Awake and alert, Oriented x 3             Motor exam:        C5       C6       C7       C8       T1   R  5/5      5/5     5/5     5/5      5/5  L   5/5      5/5     5/5     5/5      5/5         L2        L3       L4       L5      S1  R  5/5      5/5     5/5     5/5    5/5  L   5/5     5/5      5/5     5/5    5/5    Sensory:  (0=absent, 1=impaired, 2=normal, NT=not testable)      C5    C6   C7   C8   T1         R   2     2      2     2      2  L    2     2      2     2      2        L2    L3   L4   L5   S1   R   2     2     2     2     2  L    2     2     2     2     2    Right Upper extremity:   Negative Fink  Compartments soft and compressible    Left Upper extremity:   Negative Fink  Compartments soft and compressible    Right Lower Extremity:   Negative Clonus  Negative Babinski  +DP  Compartments soft and compressible           Left Lower Extremity:  Negative Clonus   Negative Babinski  +DP  Compartments soft and compressible    Secondary  Skin intact. No erythema/ecchymosis. Pt endorsed generalized mild pain when palpating all extremities, slightly worse pain when palpating L anterior ankle, but no TTP at the lateral and medial malleoli or pain with ankle motion. SILT, palpable pulses, full/painless A/PROM, compartments soft. No other injuries or complaints. Negative logroll/heelstrike BL.     Imaging:   CT pelvis: subacute appearing transversely oriented fracture of the sacrum at the S3-4 level. There are minimal osteoarthritic changes at the hips bilaterally. There is lower lumbar spondylosis.  L Ankle Xray: no obvious fractures noted                                            A/P :   Patient is a 72yFemale w/ subacute fracture of S3-S4.    -Clinical presentation and physical exam are not consistent w/ acute cord compression/cauda equina. Does not demonstrate red flag symptoms such as bowel/bladder incontinence, fevers/chills, or weight loss  -WBAT  -Multimodal analgesia as needed  -Use doughnut if pain experienced while sitting  -Recommend follow up w/ outpatient in 1-2 weeks. Call office to schedule appointment.  -All patient's questions answered. Patient understands and agrees w/ above plan.  -Will discuss w/ attending and advise if plan changes.     Pt was seen and examined at bedside for back pain that began after a fall on Thursday. She went to the ED today because her pain has been consistent and she and her son were concerned for COVID. She states that she was on a couch and slipped off and landed on the floor. She denies any head trauma or LOC. She denies any radiating pain to the limbs, fever, or chills. She states that has a history of urinary incontinence before the fall, but no changes since the fall. Also endorses mild b/l groin pain since the fall, but otherwise no other orthopedic complaints.    PAST MEDICAL & SURGICAL HISTORY:  HLD (hyperlipidemia)      HLD (hyperlipidemia)        Vital Signs Last 24 Hrs  T(C): 36.9 (12 Jul 2022 11:05), Max: 37.8 (12 Jul 2022 01:20)  T(F): 98.5 (12 Jul 2022 11:05), Max: 100.1 (12 Jul 2022 01:20)  HR: 64 (12 Jul 2022 11:05) (62 - 121)  BP: 125/66 (12 Jul 2022 11:05) (109/59 - 160/67)  RR: 16 (12 Jul 2022 11:05) (16 - 19)  SpO2: 99% (12 Jul 2022 11:05) (94% - 99%)      LABS:                        13.8   5.69  )-----------( 254      ( 11 Jul 2022 10:50 )             39.7     07-11    133<L>  |  101  |  10  ----------------------------<  123<H>  3.8   |  23  |  1.10    Ca    9.6      11 Jul 2022 10:50    TPro  7.7  /  Alb  4.2  /  TBili  0.4  /  DBili  x   /  AST  36  /  ALT  31  /  AlkPhos  85  07-11    PT/INR - ( 11 Jul 2022 10:50 )   PT: 12.9 sec;   INR: 1.10 ratio         PTT - ( 11 Jul 2022 10:50 )  PTT:30.7 sec      PHYSICAL EXAM:  General; Awake and alert, Oriented x 3             Motor exam:        C5       C6       C7       C8       T1   R  5/5      5/5     5/5     5/5      5/5  L   5/5      5/5     5/5     5/5      5/5         L2        L3       L4       L5      S1  R  5/5      5/5     5/5     5/5    5/5  L   5/5     5/5      5/5     5/5    5/5    Sensory:  (0=absent, 1=impaired, 2=normal, NT=not testable)      C5    C6   C7   C8   T1         R   2     2      2     2      2  L    2     2      2     2      2        L2    L3   L4   L5   S1   R   2     2     2     2     2  L    2     2     2     2     2    Right Upper extremity:   Negative Fink  Compartments soft and compressible    Left Upper extremity:   Negative Fink  Compartments soft and compressible    Right Lower Extremity:   Negative Clonus  Negative Babinski  +DP  Compartments soft and compressible           Left Lower Extremity:  Negative Clonus   Negative Babinski  +DP  Compartments soft and compressible    Secondary  Skin intact. No erythema/ecchymosis. Pt endorsed generalized mild pain when palpating all extremities, slightly worse pain when palpating L anterior ankle, but no TTP at the lateral and medial malleoli or pain with ankle motion. SILT, palpable pulses, full/painless A/PROM, compartments soft. No other injuries or complaints. Negative logroll/heelstrike BL.     Imaging:   CT pelvis: subacute appearing transversely oriented fracture of the sacrum at the S3-4 level. There are minimal osteoarthritic changes at the hips bilaterally. There is lower lumbar spondylosis.  L Ankle Xray: no obvious fractures noted                                            A/P :   Patient is a 72yFemale w/ subacute fracture of S3-S4.    -Clinical presentation and physical exam are not consistent w/ acute cord compression/cauda equina. Does not demonstrate red flag symptoms such as bowel/bladder incontinence, fevers/chills, or weight loss  -WBAT  -Physical therapy, no restrictions  -Multimodal analgesia as needed  -Use doughnut if pain experienced while sitting  -Recommend follow up w/ outpatient in 1-2 weeks. Call office to schedule appointment.  -All patient's questions answered. Patient understands and agrees w/ above plan.  -Will discuss w/ attending and advise if plan changes.

## 2022-07-12 NOTE — PROGRESS NOTE ADULT - ASSESSMENT
This is a 71 y/o F with HLD presented to the ED after she was found on the floor today with incidental finding of COVID Pna and new onset of Afib with RVR    New Onset of Afib/s/p Fall  -CHADSVASC score is elevated to at least 2  -continues on lovenox, can switch to eliquis prior to discharge  -rate is better controlled with cardizem 30 q 6 hours  -ECHO ordered  -continue to monitor on tele  - Monitor electrolytes, replete to keep K>4 and Mag>2  - Obtain TTE  - Remote tele   - TSH normal  - CPK's slightly elevated but normal hsT.  Can continue to trend to r/o Rhabdo  - No anginal complaints.  Would repeat EKG when HR slows down  - No evidence of volume overload.  - Fall appears to be mechanical, not a true fall.  No syncope  - Follow up labs and CxR    Will continue to follow  Nadira Donis NP   This is a 71 y/o F with HLD presented to the ED after she was found on the floor today with incidental finding of COVID Pna and new onset of Afib with RVR    New Onset of Afib/s/p Fall  -CHADSVASC score is elevated to at least 2  -continues on lovenox, can switch to eliquis prior to discharge  -converted to sr at 8p last night  -cont cardizem 30 q 6 hours  -ECHO ordered  -continue to monitor on tele  - Monitor electrolytes, replete to keep K>4 and Mag>2   - Remote tele   - TSH normal  - CPK's slightly elevated but normal hsT.  Can continue to trend to r/o Rhabdo  - No anginal complaints.  Would repeat EKG when HR slows down  - No evidence of volume overload.  - Fall appears to be mechanical, not a true fall.  No syncope  - Follow up labs and CxR    Will continue to follow  Nadira Donis NP

## 2022-07-12 NOTE — DISCHARGE NOTE PROVIDER - CARE PROVIDER_API CALL
ALLEN OVALLE  Family Practice  [address] [place] [address]  Phone: [phone]  Fax: [phone]  Follow Up Time:     Tahir Stewart)  Orthopaedic Surgery  [address] [place] [address]  Phone: [phone]  Fax: [phone]  Follow Up Time:     [facility] Suarez [facility]  [address] [place] [address]  Phone: [phone]  Fax: [phone]  Follow Up Time:     Raffi Gibbs)  Cardiovascular Disease  [address] [place] [address]  Phone: [phone]  Fax: [phone]  Follow Up Time:

## 2022-07-13 ENCOUNTER — APPOINTMENT (OUTPATIENT)
Dept: ORTHOPEDIC SURGERY | Facility: CLINIC | Age: 72
End: 2022-07-13

## 2022-07-13 DIAGNOSIS — S32.10XA UNSPECIFIED FRACTURE OF SACRUM, INITIAL ENCOUNTER FOR CLOSED FRACTURE: ICD-10-CM

## 2022-07-13 LAB
ALBUMIN SERPL ELPH-MCNC: 3.4 G/DL — SIGNIFICANT CHANGE UP (ref 3.3–5)
ALP SERPL-CCNC: 70 U/L — SIGNIFICANT CHANGE UP (ref 40–120)
ALT FLD-CCNC: 36 U/L — SIGNIFICANT CHANGE UP (ref 12–78)
ANION GAP SERPL CALC-SCNC: 7 MMOL/L — SIGNIFICANT CHANGE UP (ref 5–17)
AST SERPL-CCNC: 34 U/L — SIGNIFICANT CHANGE UP (ref 15–37)
BASOPHILS # BLD AUTO: 0 K/UL — SIGNIFICANT CHANGE UP (ref 0–0.2)
BASOPHILS NFR BLD AUTO: 0 % — SIGNIFICANT CHANGE UP (ref 0–2)
BILIRUB SERPL-MCNC: 0.6 MG/DL — SIGNIFICANT CHANGE UP (ref 0.2–1.2)
BUN SERPL-MCNC: 12 MG/DL — SIGNIFICANT CHANGE UP (ref 7–23)
CALCIUM SERPL-MCNC: 8.8 MG/DL — SIGNIFICANT CHANGE UP (ref 8.5–10.1)
CHLORIDE SERPL-SCNC: 100 MMOL/L — SIGNIFICANT CHANGE UP (ref 96–108)
CO2 SERPL-SCNC: 25 MMOL/L — SIGNIFICANT CHANGE UP (ref 22–31)
CREAT SERPL-MCNC: 0.84 MG/DL — SIGNIFICANT CHANGE UP (ref 0.5–1.3)
EGFR: 73 ML/MIN/1.73M2 — SIGNIFICANT CHANGE UP
EOSINOPHIL # BLD AUTO: 0.1 K/UL — SIGNIFICANT CHANGE UP (ref 0–0.5)
EOSINOPHIL NFR BLD AUTO: 4 % — SIGNIFICANT CHANGE UP (ref 0–6)
GLUCOSE SERPL-MCNC: 134 MG/DL — HIGH (ref 70–99)
HCT VFR BLD CALC: 37.7 % — SIGNIFICANT CHANGE UP (ref 34.5–45)
HGB BLD-MCNC: 13 G/DL — SIGNIFICANT CHANGE UP (ref 11.5–15.5)
LYMPHOCYTES # BLD AUTO: 1.39 K/UL — SIGNIFICANT CHANGE UP (ref 1–3.3)
LYMPHOCYTES # BLD AUTO: 55 % — HIGH (ref 13–44)
MCHC RBC-ENTMCNC: 29.6 PG — SIGNIFICANT CHANGE UP (ref 27–34)
MCHC RBC-ENTMCNC: 34.5 GM/DL — SIGNIFICANT CHANGE UP (ref 32–36)
MCV RBC AUTO: 85.9 FL — SIGNIFICANT CHANGE UP (ref 80–100)
MONOCYTES # BLD AUTO: 0.13 K/UL — SIGNIFICANT CHANGE UP (ref 0–0.9)
MONOCYTES NFR BLD AUTO: 5 % — SIGNIFICANT CHANGE UP (ref 2–14)
NEUTROPHILS # BLD AUTO: 0.86 K/UL — LOW (ref 1.8–7.4)
NEUTROPHILS NFR BLD AUTO: 34 % — LOW (ref 43–77)
NRBC # BLD: SIGNIFICANT CHANGE UP /100 WBCS (ref 0–0)
PLATELET # BLD AUTO: 241 K/UL — SIGNIFICANT CHANGE UP (ref 150–400)
POTASSIUM SERPL-MCNC: 3.4 MMOL/L — LOW (ref 3.5–5.3)
POTASSIUM SERPL-SCNC: 3.4 MMOL/L — LOW (ref 3.5–5.3)
PROT SERPL-MCNC: 6.8 G/DL — SIGNIFICANT CHANGE UP (ref 6–8.3)
RBC # BLD: 4.39 M/UL — SIGNIFICANT CHANGE UP (ref 3.8–5.2)
RBC # FLD: 13 % — SIGNIFICANT CHANGE UP (ref 10.3–14.5)
SODIUM SERPL-SCNC: 132 MMOL/L — LOW (ref 135–145)
WBC # BLD: 2.52 K/UL — LOW (ref 3.8–10.5)
WBC # FLD AUTO: 2.52 K/UL — LOW (ref 3.8–10.5)

## 2022-07-13 PROCEDURE — 72197 MRI PELVIS W/O & W/DYE: CPT | Mod: 26

## 2022-07-13 PROCEDURE — 99232 SBSQ HOSP IP/OBS MODERATE 35: CPT

## 2022-07-13 PROCEDURE — 93306 TTE W/DOPPLER COMPLETE: CPT | Mod: 26

## 2022-07-13 RX ORDER — POTASSIUM CHLORIDE 20 MEQ
20 PACKET (EA) ORAL
Refills: 0 | Status: COMPLETED | OUTPATIENT
Start: 2022-07-13 | End: 2022-07-13

## 2022-07-13 RX ORDER — DILTIAZEM HCL 120 MG
30 CAPSULE, EXT RELEASE 24 HR ORAL EVERY 6 HOURS
Refills: 0 | Status: DISCONTINUED | OUTPATIENT
Start: 2022-07-13 | End: 2022-07-14

## 2022-07-13 RX ADMIN — LIDOCAINE 1 PATCH: 4 CREAM TOPICAL at 05:22

## 2022-07-13 RX ADMIN — ENOXAPARIN SODIUM 80 MILLIGRAM(S): 100 INJECTION SUBCUTANEOUS at 05:24

## 2022-07-13 RX ADMIN — Medication 30 MILLIGRAM(S): at 13:08

## 2022-07-13 RX ADMIN — ENOXAPARIN SODIUM 80 MILLIGRAM(S): 100 INJECTION SUBCUTANEOUS at 18:34

## 2022-07-13 RX ADMIN — LIDOCAINE 1 PATCH: 4 CREAM TOPICAL at 13:16

## 2022-07-13 RX ADMIN — Medication 25 MILLIGRAM(S): at 13:08

## 2022-07-13 RX ADMIN — Medication 20 MILLIEQUIVALENT(S): at 22:06

## 2022-07-13 RX ADMIN — Medication 20 MILLIEQUIVALENT(S): at 19:52

## 2022-07-13 RX ADMIN — Medication 25 MILLIGRAM(S): at 22:06

## 2022-07-13 NOTE — DIETITIAN INITIAL EVALUATION ADULT - PERTINENT MEDS FT
MEDICATIONS  (STANDING):  diltiazem    Tablet 30 milliGRAM(s) Oral every 6 hours  enoxaparin Injectable 80 milliGRAM(s) SubCutaneous every 12 hours  lidocaine   4% Patch 1 Patch Transdermal daily  metoprolol tartrate 25 milliGRAM(s) Oral three times a day    MEDICATIONS  (PRN):  acetaminophen     Tablet .. 650 milliGRAM(s) Oral every 6 hours PRN Mild Pain (1 - 3)  traMADol 25 milliGRAM(s) Oral every 4 hours PRN Moderate Pain (4 - 6)

## 2022-07-13 NOTE — PHYSICAL THERAPY INITIAL EVALUATION ADULT - TRANSFER TRAINING, PT EVAL
after 2-3 weeks patient will be able to perform transfer training with supervision in order to transition in STU room more easily after 2-3 weeks patient will be able to perform transfer training with supervision with RW in order to transition in STU room more easily

## 2022-07-13 NOTE — DIETITIAN INITIAL EVALUATION ADULT - PERTINENT LABORATORY DATA
07-11    133<L>  |  101  |  10  ----------------------------<  123<H>  3.8   |  23  |  1.10    Ca    9.6      11 Jul 2022 10:50    TPro  7.7  /  Alb  4.2  /  TBili  0.4  /  DBili  x   /  AST  36  /  ALT  31  /  AlkPhos  85  07-11

## 2022-07-13 NOTE — PROGRESS NOTE ADULT - SUBJECTIVE AND OBJECTIVE BOX
Patient is a 72y old  Female who presents with a chief complaint of Unspecified fall, initial encounter  feels well   (13 Jul 2022 10:03)      INTERVAL HPI/OVERNIGHT EVENTS:  T(C): 36.7 (07-13-22 @ 11:52), Max: 36.8 (07-13-22 @ 06:07)  HR: 70 (07-13-22 @ 11:52) (58 - 70)  BP: 112/58 (07-13-22 @ 11:52) (98/67 - 112/58)  RR: 17 (07-13-22 @ 11:52) (16 - 17)  SpO2: 94% (07-13-22 @ 11:52) (94% - 95%)  Wt(kg): --  I&O's Summary      LABS:                        13.0   2.52  )-----------( 241      ( 13 Jul 2022 09:40 )             37.7     07-13    132<L>  |  100  |  12  ----------------------------<  134<H>  3.4<L>   |  25  |  0.84    Ca    8.8      13 Jul 2022 09:40    TPro  6.8  /  Alb  3.4  /  TBili  0.6  /  DBili  x   /  AST  34  /  ALT  36  /  AlkPhos  70  07-13        CAPILLARY BLOOD GLUCOSE                MEDICATIONS  (STANDING):  diltiazem    Tablet 30 milliGRAM(s) Oral every 6 hours  enoxaparin Injectable 80 milliGRAM(s) SubCutaneous every 12 hours  lidocaine   4% Patch 1 Patch Transdermal daily  metoprolol tartrate 25 milliGRAM(s) Oral three times a day  potassium chloride    Tablet ER 20 milliEquivalent(s) Oral every 2 hours    MEDICATIONS  (PRN):  acetaminophen     Tablet .. 650 milliGRAM(s) Oral every 6 hours PRN Mild Pain (1 - 3)  traMADol 25 milliGRAM(s) Oral every 4 hours PRN Moderate Pain (4 - 6)      REVIEW OF SYSTEMS:  CONSTITUTIONAL: No fever, weight loss, or fatigue  EYES: No eye pain, visual disturbances, or discharge  ENMT:  No difficulty hearing, tinnitus, vertigo; No sinus or throat pain  NECK: No pain or stiffness  RESPIRATORY: No cough, wheezing, chills or hemoptysis; No shortness of breath  CARDIOVASCULAR: No chest pain, palpitations, dizziness, or leg swelling  GASTROINTESTINAL: No abdominal or epigastric pain. No nausea, vomiting, or hematemesis; No diarrhea or constipation. No melena or hematochezia.  GENITOURINARY: No dysuria, frequency, hematuria, or incontinence  NEUROLOGICAL: No headaches, memory loss, loss of strength, numbness, or tremors  SKIN: No itching, burning, rashes, or lesions   LYMPH NODES: No enlarged glands  ENDOCRINE: No heat or cold intolerance; No hair loss  MUSCULOSKELETAL: sacral pain  HEME/LYMPH: No easy bruising, or bleeding gums  ALLERY AND IMMUNOLOGIC: No hives or eczema    RADIOLOGY & ADDITIONAL TESTS:    Imaging Personally Reviewed:  [x ] YES  [ ] NO    Consultant(s) Notes Reviewed:  [x ] YES  [ ] NO    PHYSICAL EXAM:  GENERAL: NAD, well-groomed, well-developed  HEAD:  Atraumatic, Normocephalic  EYES: EOMI, PERRLA, conjunctiva and sclera clear  ENMT: No tonsillar erythema, exudates, or enlargement; Moist mucous membranes, Good dentition, No lesions  NECK: Supple, No JVD, Normal thyroid  NERVOUS SYSTEM:  Alert & Oriented X3, Good concentration; Motor Strength 5/5 B/L upper and lower extremities; DTRs 2+ intact and symmetric  CHEST/LUNG: Clear to percussion bilaterally; No rales, rhonchi, wheezing, or rubs  HEART: irreg irreg  ABDOMEN: Soft, Nontender, Nondistended; Bowel sounds present  EXTREMITIES:  2+ Peripheral Pulses, No clubbing, cyanosis, or edema  LYMPH: No lymphadenopathy noted  SKIN: No rashes or lesions    Care Discussed with Consultants/Other Providers [x ] YES  [ ] NO    advance care planning and advance directives discussed, including but not limited to long term care planning, all forms reviewed and discussed [x]YES [ ]NO

## 2022-07-13 NOTE — PHYSICAL THERAPY INITIAL EVALUATION ADULT - ADL SKILLS, REHAB EVAL
LM for Lela/ daughter to call back  - DM is well controlled.  Given pt's weight loss, can decrease the metformin to 1000 mg daily, taken at dinnertime.  - PTH is high, PHP a known issue.  Calcium stable.  Vit D ok   - B12 is ok on 500 mcg daily   - microalbuminuria/ proteinuria persist, but no MGUS/ BJP's  - renal fn/ liver fn nl. Nl inflam markers.  Nl TSH  - chol is much higher.   Suspect pt not taking the atorva 20  but if she is, then need to do more of a proteinuria w/u. independent

## 2022-07-13 NOTE — PHYSICAL THERAPY INITIAL EVALUATION ADULT - BED MOBILITY TRAINING, PT EVAL
after 2-3 weeks patient will be able to perform bed mobility with min A in order to avoid bed sores after 2-3 weeks patient will be able to perform bed mobility with min A in order to be more independent

## 2022-07-13 NOTE — PHYSICAL THERAPY INITIAL EVALUATION ADULT - PERTINENT HX OF CURRENT PROBLEM, REHAB EVAL
Hypotension, HLD, sacral fracture, generalized weakness, mild lower back pain fall causing sacral fracture, Hypotension, generalized weakness, mild lower back pain

## 2022-07-13 NOTE — DIETITIAN INITIAL EVALUATION ADULT - OTHER INFO
72 year old female Dx fall back pain COVID 19 + PMH HLD   no recent weight change reported current weight 175#  no BM this admit per flow sheet

## 2022-07-13 NOTE — OCCUPATIONAL THERAPY INITIAL EVALUATION ADULT - GENERAL OBSERVATIONS, REHAB EVAL
Patient was supine in bed with +telemonitor and wearing progressive eyeglasses. Patient appeared mildly confused with delayed cognitive processing noted.

## 2022-07-13 NOTE — DIETITIAN INITIAL EVALUATION ADULT - ORAL INTAKE PTA/DIET HISTORY
patient reports with good PO intake and states follow regular diet at home. eats many convenience foods  reports allergy to eggs

## 2022-07-13 NOTE — PHYSICAL THERAPY INITIAL EVALUATION ADULT - GAIT TRAINING, PT EVAL
after 2-3 weeks patient will be able to perform gait training with supervision in order to be more independent in STU after 2-3 weeks patient will be able to perform gait training with supervision with RW in order to be more independent in STU

## 2022-07-13 NOTE — PROGRESS NOTE ADULT - SUBJECTIVE AND OBJECTIVE BOX
[facility] -- Velma Benitez, Cherelle, Bernie, Alfredo John Savella, Goodger  Office # [phone]    Follow Up:  new onset afib    Subjective/Observations: No events overnight resting comfortably in bed.  No complaints of chest pain, dyspnea, or palpitations reported. No signs of orthopnea or PND.      REVIEW OF SYSTEMS: All other review of systems is negative unless indicated above  PAST MEDICAL & SURGICAL HISTORY:  HLD (hyperlipidemia)      HLD (hyperlipidemia)        MEDICATIONS  (STANDING):  diltiazem    Tablet 30 milliGRAM(s) Oral every 6 hours  enoxaparin Injectable 80 milliGRAM(s) SubCutaneous every 12 hours  lidocaine   4% Patch 1 Patch Transdermal daily  metoprolol tartrate 25 milliGRAM(s) Oral three times a day    MEDICATIONS  (PRN):  acetaminophen     Tablet .. 650 milliGRAM(s) Oral every 6 hours PRN Mild Pain (1 - 3)  traMADol 25 milliGRAM(s) Oral every 4 hours PRN Moderate Pain (4 - 6)    Allergies    Drug Allergies Not Recorded  eggs (Unknown)    Intolerances      Vital Signs Last 24 Hrs  T(C): 36.7 (13 Jul 2022 11:52), Max: 36.8 (13 Jul 2022 06:07)  T(F): 98 (13 Jul 2022 11:52), Max: 98.3 (13 Jul 2022 06:07)  HR: 70 (13 Jul 2022 11:52) (58 - 70)  BP: 112/58 (13 Jul 2022 11:52) (98/67 - 112/58)  BP(mean): --  RR: 17 (13 Jul 2022 11:52) (16 - 17)  SpO2: 94% (13 Jul 2022 11:52) (94% - 95%)    Parameters below as of 13 Jul 2022 06:07  Patient On (Oxygen Delivery Method): room air      I&O's Summary     TELE: Sinus 60s  PHYSICAL EXAM:  Constitutional: NAD, awake and alert, obese  HEENT: Moist Mucous Membranes, Anicteric  Pulmonary: Non-labored, breath sounds are clear bilaterally, No wheezing, rales or rhonchi  Cardiovascular: Regular, S1 and S2, No murmurs, rubs, gallops or clicks  Gastrointestinal: Bowel Sounds present, soft, nontender.   Lymph: No peripheral edema. No lymphadenopathy.  Skin: No visible rashes or ulcers.  Psych:  Mood & affect appropriate  LABS: All Labs Reviewed:                        13.0   2.52  )-----------( 241      ( 13 Jul 2022 09:40 )             37.7                         13.8   5.69  )-----------( 254      ( 11 Jul 2022 10:50 )             39.7     13 Jul 2022 09:40    132    |  100    |  12     ----------------------------<  134    3.4     |  25     |  0.84   11 Jul 2022 10:50    133    |  101    |  10     ----------------------------<  123    3.8     |  23     |  1.10     Ca    8.8        13 Jul 2022 09:40  Ca    9.6        11 Jul 2022 10:50    TPro  6.8    /  Alb  3.4    /  TBili  0.6    /  DBili  x      /  AST  34     /  ALT  36     /  AlkPhos  70     13 Jul 2022 09:40  TPro  7.7    /  Alb  4.2    /  TBili  0.4    /  DBili  x      /  AST  36     /  ALT  31     /  AlkPhos  85     11 Jul 2022 10:50        < from: CT Pelvis No Cont (07.11.22 @ 16:02) >    IMPRESSION: Subacute appearing transversely oriented fracture of the   sacrum at the S3-4 level. MRI can be performed to better age the fracture   if indicated.    5.5 x 4.8 cm soft tissue density mass in the right adnexa which may   represent an adnexal mass or exophytic fibroid. Ultrasound is recommended   to further evaluate.        --- End of Report ---      EKG: Afib with RVR, ST depressions in lateral leads      RUTH QUINTEROS MD; Attending Radiologist  This document has been electronically signed. Jul 11 2022  4:31PM    < end of copied text >

## 2022-07-13 NOTE — OCCUPATIONAL THERAPY INITIAL EVALUATION ADULT - ADDITIONAL COMMENTS
Pt lives in a 2nd floor apt with 15 steps with handrail to enter, no step inside. Pt has a bathtub/shower combo with multiple grab bars. Pt drives a car. Pt ambulated with no devices and does not own any DME. Pt performed sit to stand and ambulated 20' using RW with min assist. Pt requires assistance with ADL's and transfers due to decreased strength, decreased endurance, impaired cognition and impaired sitting/standing balance.

## 2022-07-13 NOTE — PROGRESS NOTE ADULT - ASSESSMENT
73 y/o F with HLD presented to the ED after she was found on the floor today with incidental finding of COVID Pna and new onset of Afib with RVR.       New Onset of Afib/s/p Fall  - No known Hx of Afib.  EKG/tele showed RVR at 120's, likely, in the setting of pulmonary infection/dehydration  - S/p Cardizem 10 mg IV x 2.   - Converted to NSR 7/11 8pm  - Continue Cardizem 30 mg q6H, can be switched to long acting prior to discharge  - [id number] score= 2.  Discussed re: need for AC.  Risk vs benefits discussed.  States, she is not sure if she would take it long-term.  She lives alone and has 17 flights of stairs at home.  For now, would start FD Lovenox and will readdress in the near future. Can be switch to Eliquis prior to DC  - TSH wnl    - No evidence of volume overload  - TTE ordered    - Fall appears to be mechanical, not a true fall.  No syncope  - Monitor and replete lytes, keep K>4, Mg>2.  - Will continue to follow.    Olayinka Rangel NP  Nurse Practitioner- Cardiology   Spectra #[phone]  - Trop wnl x1, denied anginal symptoms no evidence of acute ischemia

## 2022-07-13 NOTE — PHYSICAL THERAPY INITIAL EVALUATION ADULT - ADDITIONAL COMMENTS
Patient lives in second story apartment with 15 stairs (with handrail) to enter. Previous to admission she was independent with all ADL's. During session, patient was able to follow commands with some mild cognitive delay while also appearing slightly confused. Patient was A&OX4 but stated that she feels "un-oriented." Patient also had pain with movement due to sacral fracture from fall.

## 2022-07-13 NOTE — OCCUPATIONAL THERAPY INITIAL EVALUATION ADULT - PERTINENT HX OF CURRENT PROBLEM, REHAB EVAL
73 y/o female admitted 7/11/22 with +sacral fracture, hypotension, new Afib with RVR and COVID+ pneumonia s/p fall at home. As per ortho note 7/12/22 1124; CT pelvis: subacute appearing transversely oriented fracture of the sacrum at the S3-4 level. +Minimal osteoarthritic changes at the hips bilaterally. +lower lumbar spondylosis. L Ankle Xray: no obvious fractures noted.

## 2022-07-14 LAB
ANION GAP SERPL CALC-SCNC: 7 MMOL/L — SIGNIFICANT CHANGE UP (ref 5–17)
BASOPHILS # BLD AUTO: 0.02 K/UL — SIGNIFICANT CHANGE UP (ref 0–0.2)
BASOPHILS NFR BLD AUTO: 0.7 % — SIGNIFICANT CHANGE UP (ref 0–2)
BUN SERPL-MCNC: 14 MG/DL — SIGNIFICANT CHANGE UP (ref 7–23)
CALCIUM SERPL-MCNC: 9.1 MG/DL — SIGNIFICANT CHANGE UP (ref 8.5–10.1)
CHLORIDE SERPL-SCNC: 104 MMOL/L — SIGNIFICANT CHANGE UP (ref 96–108)
CO2 SERPL-SCNC: 26 MMOL/L — SIGNIFICANT CHANGE UP (ref 22–31)
CREAT SERPL-MCNC: 0.58 MG/DL — SIGNIFICANT CHANGE UP (ref 0.5–1.3)
EGFR: 95 ML/MIN/1.73M2 — SIGNIFICANT CHANGE UP
EOSINOPHIL # BLD AUTO: 0.04 K/UL — SIGNIFICANT CHANGE UP (ref 0–0.5)
EOSINOPHIL NFR BLD AUTO: 1.3 % — SIGNIFICANT CHANGE UP (ref 0–6)
GLUCOSE SERPL-MCNC: 91 MG/DL — SIGNIFICANT CHANGE UP (ref 70–99)
HCT VFR BLD CALC: 38.9 % — SIGNIFICANT CHANGE UP (ref 34.5–45)
HGB BLD-MCNC: 13.4 G/DL — SIGNIFICANT CHANGE UP (ref 11.5–15.5)
IMM GRANULOCYTES NFR BLD AUTO: 0.3 % — SIGNIFICANT CHANGE UP (ref 0–1.5)
LYMPHOCYTES # BLD AUTO: 1.47 K/UL — SIGNIFICANT CHANGE UP (ref 1–3.3)
LYMPHOCYTES # BLD AUTO: 48.5 % — HIGH (ref 13–44)
MAGNESIUM SERPL-MCNC: 1.8 MG/DL — SIGNIFICANT CHANGE UP (ref 1.6–2.6)
MCHC RBC-ENTMCNC: 29.5 PG — SIGNIFICANT CHANGE UP (ref 27–34)
MCHC RBC-ENTMCNC: 34.4 GM/DL — SIGNIFICANT CHANGE UP (ref 32–36)
MCV RBC AUTO: 85.7 FL — SIGNIFICANT CHANGE UP (ref 80–100)
MONOCYTES # BLD AUTO: 0.41 K/UL — SIGNIFICANT CHANGE UP (ref 0–0.9)
MONOCYTES NFR BLD AUTO: 13.5 % — SIGNIFICANT CHANGE UP (ref 2–14)
NEUTROPHILS # BLD AUTO: 1.08 K/UL — LOW (ref 1.8–7.4)
NEUTROPHILS NFR BLD AUTO: 35.7 % — LOW (ref 43–77)
NRBC # BLD: 0 /100 WBCS — SIGNIFICANT CHANGE UP (ref 0–0)
PLATELET # BLD AUTO: 237 K/UL — SIGNIFICANT CHANGE UP (ref 150–400)
POTASSIUM SERPL-MCNC: 4.3 MMOL/L — SIGNIFICANT CHANGE UP (ref 3.5–5.3)
POTASSIUM SERPL-SCNC: 4.3 MMOL/L — SIGNIFICANT CHANGE UP (ref 3.5–5.3)
RBC # BLD: 4.54 M/UL — SIGNIFICANT CHANGE UP (ref 3.8–5.2)
RBC # FLD: 13 % — SIGNIFICANT CHANGE UP (ref 10.3–14.5)
SODIUM SERPL-SCNC: 137 MMOL/L — SIGNIFICANT CHANGE UP (ref 135–145)
WBC # BLD: 3.03 K/UL — LOW (ref 3.8–10.5)
WBC # FLD AUTO: 3.03 K/UL — LOW (ref 3.8–10.5)

## 2022-07-14 PROCEDURE — 99232 SBSQ HOSP IP/OBS MODERATE 35: CPT

## 2022-07-14 RX ORDER — DILTIAZEM HCL 120 MG
120 CAPSULE, EXT RELEASE 24 HR ORAL DAILY
Refills: 0 | Status: DISCONTINUED | OUTPATIENT
Start: 2022-07-14 | End: 2022-07-14

## 2022-07-14 RX ORDER — DILTIAZEM HCL 120 MG
120 CAPSULE, EXT RELEASE 24 HR ORAL DAILY
Refills: 0 | Status: DISCONTINUED | OUTPATIENT
Start: 2022-07-15 | End: 2022-07-15

## 2022-07-14 RX ORDER — METOPROLOL TARTRATE 50 MG
75 TABLET ORAL DAILY
Refills: 0 | Status: DISCONTINUED | OUTPATIENT
Start: 2022-07-14 | End: 2022-07-15

## 2022-07-14 RX ADMIN — Medication 30 MILLIGRAM(S): at 01:45

## 2022-07-14 RX ADMIN — LIDOCAINE 1 PATCH: 4 CREAM TOPICAL at 13:18

## 2022-07-14 RX ADMIN — ENOXAPARIN SODIUM 80 MILLIGRAM(S): 100 INJECTION SUBCUTANEOUS at 18:33

## 2022-07-14 RX ADMIN — Medication 30 MILLIGRAM(S): at 06:45

## 2022-07-14 RX ADMIN — ENOXAPARIN SODIUM 80 MILLIGRAM(S): 100 INJECTION SUBCUTANEOUS at 06:46

## 2022-07-14 RX ADMIN — LIDOCAINE 1 PATCH: 4 CREAM TOPICAL at 20:55

## 2022-07-14 RX ADMIN — LIDOCAINE 1 PATCH: 4 CREAM TOPICAL at 02:49

## 2022-07-14 RX ADMIN — Medication 25 MILLIGRAM(S): at 06:46

## 2022-07-14 RX ADMIN — Medication 75 MILLIGRAM(S): at 14:07

## 2022-07-14 NOTE — PROGRESS NOTE ADULT - ASSESSMENT
72 year old female who presented with fall. Found to have new onset Afib as well as COVID positive. S/p monoclonal antibody 7/11. She remains afebrile and on room air. She has some mild leukopenia which is probably related to COVID, WBC stable.     Will sign off, please call ID if any further questions. Thank you.    Felipa Mi MD  Division of Infectious Diseases   Cell [phone] between 8am and 6pm   After 6pm and weekends please call ID service at [phone]

## 2022-07-14 NOTE — PROGRESS NOTE ADULT - SUBJECTIVE AND OBJECTIVE BOX
[facility] -- Velma Benitez, Bernie Villarreal, Alfredo John Savella, Goodger  Office # [phone]    Follow Up:   new onset afib    Subjective/Observations: No events overnight resting comfortably in bed, reports feeling well.  No complaints of chest pain, dyspnea, or palpitations reported. No signs of orthopnea or PND.    REVIEW OF SYSTEMS: All other review of systems is negative unless indicated above  PAST MEDICAL & SURGICAL HISTORY:  HLD (hyperlipidemia)      HLD (hyperlipidemia)        MEDICATIONS  (STANDING):  diltiazem    milliGRAM(s) Oral daily  enoxaparin Injectable 80 milliGRAM(s) SubCutaneous every 12 hours  lidocaine   4% Patch 1 Patch Transdermal daily  metoprolol succinate ER 75 milliGRAM(s) Oral daily    MEDICATIONS  (PRN):  acetaminophen     Tablet .. 650 milliGRAM(s) Oral every 6 hours PRN Mild Pain (1 - 3)  traMADol 25 milliGRAM(s) Oral every 4 hours PRN Moderate Pain (4 - 6)    Allergies    eggs (Unknown)  No Known Drug Allergies    Intolerances      Vital Signs Last 24 Hrs  T(C): 36.5 (14 Jul 2022 05:52), Max: 36.7 (13 Jul 2022 19:30)  T(F): 97.7 (14 Jul 2022 05:52), Max: 98 (13 Jul 2022 19:30)  HR: 63 (14 Jul 2022 10:00) (63 - 73)  BP: 105/63 (14 Jul 2022 10:00) (93/62 - 146/82)  BP(mean): --  RR: 19 (14 Jul 2022 05:52) (17 - 19)  SpO2: 98% (14 Jul 2022 10:00) (93% - 98%)    Parameters below as of 14 Jul 2022 10:00  Patient On (Oxygen Delivery Method): room air      I&O's Summary    13 Jul 2022 07:01  -  14 Jul 2022 07:00  --------------------------------------------------------  IN: 0 mL / OUT: 600 mL / NET: -600 mL    TELE: Sinus 50- 70s  PHYSICAL EXAM:  Constitutional: NAD, awake and alert, obese  HEENT: Moist Mucous Membranes, Anicteric  Pulmonary: Non-labored, breath sounds are clear bilaterally, No wheezing, rales or rhonchi  Cardiovascular: Regular, S1 and S2, No murmurs, rubs, gallops or clicks  Gastrointestinal: Bowel Sounds present, soft, nontender.   Lymph: No peripheral edema. No lymphadenopathy.  Skin: No visible rashes or ulcers.  Psych:  Mood & affect appropriate    LABS: All Labs Reviewed:                        13.4   3.03  )-----------( 237      ( 14 Jul 2022 06:55 )             38.9                         13.0   2.52  )-----------( 241      ( 13 Jul 2022 09:40 )             37.7     14 Jul 2022 06:55    137    |  104    |  14     ----------------------------<  91     4.3     |  26     |  0.58   13 Jul 2022 09:40    132    |  100    |  12     ----------------------------<  134    3.4     |  25     |  0.84     Ca    9.1        14 Jul 2022 06:55  Ca    8.8        13 Jul 2022 09:40  Mg     1.8       14 Jul 2022 06:55    TPro  6.8    /  Alb  3.4    /  TBili  0.6    /  DBili  x      /  AST  34     /  ALT  36     /  AlkPhos  70     13 Jul 2022 09:40          < from: CT Pelvis No Cont (07.11.22 @ 16:02) >    IMPRESSION: Subacute appearing transversely oriented fracture of the   sacrum at the S3-4 level. MRI can be performed to better age the fracture   if indicated.    5.5 x 4.8 cm soft tissue density mass in the right adnexa which may   represent an adnexal mass or exophytic fibroid. Ultrasound is recommended   to further evaluate.        --- End of Report ---      EKG: Afib with RVR, ST depressions in lateral leads      RUTH QUINTEROS MD; Attending Radiologist  This document has been electronically signed. Jul 11 2022  4:31PM    < end of copied text >      ACC: [id number] EXAM:  ECHO TTE WO CON COMP W DOPP                          PROCEDURE DATE:  07/13/2022          INTERPRETATION:  INDICATION: Abnormal EKG  Sonographer AP    Blood Pressure 115/75    Height 162 cm     Weight 79 kg       BSA 1.8 sq m    Dimensions:  LA 3.4       Normal Values: 2.0 - 4.0 cm  Ao 3.2        Normal Values: 2.0 - 3.8 cm  SEPTUM 1.0       Normal Values: 0.6 - 1.2 cm  PWT 0.5       Normal Values: 0.6 - 1.1 cm  LVIDd 4.4         Normal Values: 3.0 - 5.6 cm  LVIDs 3.0  Normal Values: 1.8 - 4.0 cm      OBSERVATIONS:  Mitral Valve: normal, trace physiologic MR.  Aortic Valve/Aorta: normal trileaflet aortic valve.  Tricuspid Valve: Mild TR.  Pulmonic Valve: Not well-visualized  Left Atrium: normal  Right Atrium: Notwell-visualized  Left Ventricle: normal LV size and systolic function, estimated LVEF of   60%.  Right Ventricle: Grossly normal size and systolic function.  Pericardium: no significant pericardial effusion.        IMPRESSION:  Normal left ventricular internal dimensions and systolic function,   estimated LVEF of 60%.  Grossly normal RV size and systolic function.  Normal trileaflet aortic valve, without AI.  Trace physiologic MR  Mild TR.  No significant pericardial effusion.    --- End of Report ---        MARCOS CALIXTO MD; Attending Cardiologist  This document has been electronically signed. Jul 14 2022 12:54PM

## 2022-07-14 NOTE — PROGRESS NOTE ADULT - SUBJECTIVE AND OBJECTIVE BOX
Patient is a 72y old  Female who presents with a chief complaint of Unspecified fall, initial encounter     (13 Jul 2022 10:03)      INTERVAL /OVERNIGHT EVENTS: back pain improved    MEDICATIONS  (STANDING):  enoxaparin Injectable 80 milliGRAM(s) SubCutaneous every 12 hours  lidocaine   4% Patch 1 Patch Transdermal daily  metoprolol succinate ER 75 milliGRAM(s) Oral daily    MEDICATIONS  (PRN):  acetaminophen     Tablet .. 650 milliGRAM(s) Oral every 6 hours PRN Mild Pain (1 - 3)  traMADol 25 milliGRAM(s) Oral every 4 hours PRN Moderate Pain (4 - 6)      Allergies    eggs (Unknown)  No Known Drug Allergies    Intolerances        REVIEW OF SYSTEMS:  CONSTITUTIONAL: No fever, weight loss, or fatigue  EYES: No eye pain, visual disturbances, or discharge  ENMT:  No difficulty hearing, tinnitus, vertigo; No sinus or throat pain  NECK: No pain or stiffness  RESPIRATORY: No cough, wheezing, chills or hemoptysis; No shortness of breath  CARDIOVASCULAR: No chest pain, palpitations, dizziness, or leg swelling  GASTROINTESTINAL: No abdominal or epigastric pain. No nausea, vomiting, or hematemesis; No diarrhea or constipation. No melena or hematochezia.  GENITOURINARY: No dysuria, frequency, hematuria, or incontinence  NEUROLOGICAL: No headaches, memory loss, loss of strength, numbness, or tremors  SKIN: No itching, burning, rashes, or lesions   LYMPH NODES: No enlarged glands  ENDOCRINE: No heat or cold intolerance; No hair loss; No polydipsia or polyuria  MUSCULOSKELETAL: No joint pain or swelling; No muscle, back, or extremity pain  PSYCHIATRIC: No depression, anxiety, mood swings, or difficulty sleeping  HEME/LYMPH: No easy bruising, or bleeding gums  ALLERGY AND IMMUNOLOGIC: No hives or eczema    Vital Signs Last 24 Hrs  T(C): 36.7 (14 Jul 2022 14:05), Max: 36.7 (13 Jul 2022 19:30)  T(F): 98 (14 Jul 2022 14:05), Max: 98 (13 Jul 2022 19:30)  HR: 80 (14 Jul 2022 14:05) (63 - 80)  BP: 114/70 (14 Jul 2022 14:05) (105/63 - 146/82)  BP(mean): --  RR: 18 (14 Jul 2022 14:05) (17 - 19)  SpO2: 97% (14 Jul 2022 14:05) (93% - 98%)    Parameters below as of 14 Jul 2022 14:05  Patient On (Oxygen Delivery Method): room air        PHYSICAL EXAM:  GENERAL: NAD, well-groomed, well-developed  HEAD:  Atraumatic, Normocephalic  EYES: EOMI, PERRLA, conjunctiva and sclera clear  ENMT: No tonsillar erythema, exudates, or enlargement; Moist mucous membranes, Good dentition, No lesions  NECK: Supple, No JVD, Normal thyroid  NERVOUS SYSTEM:  Alert & Oriented X3, Good concentration; Motor Strength 5/5 B/L upper and lower extremities; DTRs 2+ intact and symmetric  CHEST/LUNG: Clear to auscultation bilaterally; No rales, rhonchi, wheezing, or rubs  HEART: Regular rate and rhythm; No murmurs, rubs, or gallops  ABDOMEN: Soft, Nontender, Nondistended; Bowel sounds present  EXTREMITIES:  2+ Peripheral Pulses, No clubbing, cyanosis, or edema  LYMPH: No lymphadenopathy noted  SKIN: No rashes or lesions    LABS:                        13.4   3.03  )-----------( 237      ( 14 Jul 2022 06:55 )             38.9     14 Jul 2022 06:55    137    |  104    |  14     ----------------------------<  91     4.3     |  26     |  0.58     Ca    9.1        14 Jul 2022 06:55  Mg     1.8       14 Jul 2022 06:55          CAPILLARY BLOOD GLUCOSE          RADIOLOGY & ADDITIONAL TESTS:    Notes Reviewed:  x[ ] YES  [ ] NO    Care Discussed with Consultants/Other Providers [x ] YES  [ ] NO

## 2022-07-14 NOTE — PROGRESS NOTE ADULT - ASSESSMENT
73 y/o F with HLD presented to the ED after she was found on the floor today with incidental finding of COVID Pna and new onset of Afib with RVR.     New Onset of Afib/s/p Fall  - No known Hx of Afib.  EKG/tele showed RVR at 120's, likely, in the setting of pulmonary infection/dehydration  - S/p Cardizem 10 mg IV x 2.   - Converted to NSR 7/11 8pm  - Continue Cardizem 30 mg q6H, can be switched to long acting prior to discharge  - [id number] score= 2.  Discussed re: need for AC.  Risk vs benefits discussed.  States, she is not sure if she would take it long-term.  She lives alone and has 17 flights of stairs at home.  For now, would start FD Lovenox and will readdress in the near future. Can be switch to Eliquis prior to DC  - TSH wnl    - No evidence of volume overload  - TTE ordered    - Trop wnl x1, denied anginal symptoms no evidence of acute ischemia    - Fall appears to be mechanical, not a true fall.  No syncope    - Monitor and replete lytes, keep K>4, Mg>2.  - Will continue to follow.    Olayinka Rangel NP  Nurse Practitioner- Cardiology   Spectra #[phone]   71 y/o F with HLD presented to the ED after she was found on the floor today with incidental finding of COVID Pna and new onset of Afib with RVR.     New Onset of Afib/s/p Fall  - No known Hx of Afib.  EKG/tele showed RVR at 120's, likely, in the setting of pulmonary infection/dehydration  - S/p Cardizem 10 mg IV x 2.   - Converted to NSR 7/11 8pm  - Continue Cardizem 30 mg q6H, can be switched to long acting prior to discharge  - [id number] score= 2.  Discussed re: need for AC.  Risk vs benefits discussed.  States, she is not sure if she would take it long-term.  She lives alone and has 17 flights of stairs at home.  For now, would start FD Lovenox and will readdress in the near future. Can be switch to Eliquis prior to DC  - TSH wnl    -TTE showed normal LV and RV size and systolic function, with EF 60%  - No evidence of volume overload    - Trop wnl x1, denied anginal symptoms no evidence of acute ischemia    - Fall appears to be mechanical, not a true fall.  No syncope    - Monitor and replete lytes, keep K>4, Mg>2.  - Will continue to follow.    Olayinka Rangel NP  Nurse Practitioner- Cardiology   Spectra #[phone]

## 2022-07-14 NOTE — PROGRESS NOTE ADULT - SUBJECTIVE AND OBJECTIVE BOX
[facility]  INFECTIOUS DISEASES - Katie Ramos, [address], [place] [address]  Tel: [phone]     Fax: [phone]  =======================================================    VALDO GONZÁLES [id number]    Follow up: No fevers. On room air. Feels better. Denies any SOB and cough improving. Denies any pain.    Allergies:  eggs (Unknown)  No Known Drug Allergies      Antibiotics:  acetaminophen     Tablet .. 650 milliGRAM(s) Oral every 6 hours PRN  diltiazem    milliGRAM(s) Oral daily  enoxaparin Injectable 80 milliGRAM(s) SubCutaneous every 12 hours  lidocaine   4% Patch 1 Patch Transdermal daily  metoprolol succinate ER 75 milliGRAM(s) Oral daily  traMADol 25 milliGRAM(s) Oral every 4 hours PRN       REVIEW OF SYSTEMS:  CONSTITUTIONAL:  No Fever or chills  CARDIOVASCULAR:  No chest pain or SOB.  RESPIRATORY:  No cough, shortness of breath  GASTROINTESTINAL:  No nausea, vomiting or diarrhea.  GENITOURINARY:  No dysuria, frequency or urgency.  NEUROLOGIC:  No headache  PSYCHIATRIC:  No disorder of thought or mood.       Physical Exam:  ICU Vital Signs Last 24 Hrs  T(C): 36.5 (14 Jul 2022 05:52), Max: 36.7 (13 Jul 2022 19:30)  T(F): 97.7 (14 Jul 2022 05:52), Max: 98 (13 Jul 2022 19:30)  HR: 63 (14 Jul 2022 10:00) (63 - 73)  BP: 105/63 (14 Jul 2022 10:00) (93/62 - 146/82)  BP(mean): --  ABP: --  ABP(mean): --  RR: 19 (14 Jul 2022 05:52) (17 - 19)  SpO2: 98% (14 Jul 2022 10:00) (93% - 98%)    O2 Parameters below as of 14 Jul 2022 10:00  Patient On (Oxygen Delivery Method): room air    GEN: NAD  HEENT: normocephalic and atraumatic.  NECK: Supple.   LUNGS: Normal respiratory effort  HEART: irregular rhythm  ABDOMEN: Soft, nontender, and nondistended.  EXTREMITIES: no leg edema.  NEUROLOGIC: grossly intact.  PSYCHIATRIC: Appropriate affect .      Labs:  07-14    137  |  104  |  14  ----------------------------<  91  4.3   |  26  |  0.58    Ca    9.1      14 Jul 2022 06:55  Mg     1.8     07-14    TPro  6.8  /  Alb  3.4  /  TBili  0.6  /  DBili  x   /  AST  34  /  ALT  36  /  AlkPhos  70  07-13                          13.4   3.03  )-----------( 237      ( 14 Jul 2022 06:55 )             38.9         LIVER FUNCTIONS - ( 13 Jul 2022 09:40 )  Alb: 3.4 g/dL / Pro: 6.8 g/dL / ALK PHOS: 70 U/L / ALT: 36 U/L / AST: 34 U/L / GGT: x             RECENT CULTURES:        All imaging and data are reviewed.

## 2022-07-15 LAB
FOLATE SERPL-MCNC: 11.9 NG/ML — SIGNIFICANT CHANGE UP
HCT VFR BLD CALC: 42.6 % — SIGNIFICANT CHANGE UP (ref 34.5–45)
HGB BLD-MCNC: 14.5 G/DL — SIGNIFICANT CHANGE UP (ref 11.5–15.5)
MCHC RBC-ENTMCNC: 29.4 PG — SIGNIFICANT CHANGE UP (ref 27–34)
MCHC RBC-ENTMCNC: 34 GM/DL — SIGNIFICANT CHANGE UP (ref 32–36)
MCV RBC AUTO: 86.4 FL — SIGNIFICANT CHANGE UP (ref 80–100)
NRBC # BLD: 0 /100 WBCS — SIGNIFICANT CHANGE UP (ref 0–0)
PLATELET # BLD AUTO: 273 K/UL — SIGNIFICANT CHANGE UP (ref 150–400)
RBC # BLD: 4.93 M/UL — SIGNIFICANT CHANGE UP (ref 3.8–5.2)
RBC # FLD: 12.7 % — SIGNIFICANT CHANGE UP (ref 10.3–14.5)
TSH SERPL-MCNC: 1.66 UIU/ML — SIGNIFICANT CHANGE UP (ref 0.36–3.74)
VIT B12 SERPL-MCNC: 678 PG/ML — SIGNIFICANT CHANGE UP (ref 232–1245)
WBC # BLD: 3.87 K/UL — SIGNIFICANT CHANGE UP (ref 3.8–10.5)
WBC # FLD AUTO: 3.87 K/UL — SIGNIFICANT CHANGE UP (ref 3.8–10.5)

## 2022-07-15 PROCEDURE — 99232 SBSQ HOSP IP/OBS MODERATE 35: CPT

## 2022-07-15 RX ORDER — METOPROLOL TARTRATE 50 MG
1 TABLET ORAL
Qty: 0 | Refills: 0 | DISCHARGE
Start: 2022-07-15

## 2022-07-15 RX ORDER — ALBUTEROL 90 UG/1
2 AEROSOL, METERED ORAL
Qty: 0 | Refills: 0 | DISCHARGE
Start: 2022-07-15

## 2022-07-15 RX ORDER — METOPROLOL TARTRATE 50 MG
50 TABLET ORAL DAILY
Refills: 0 | Status: DISCONTINUED | OUTPATIENT
Start: 2022-07-15 | End: 2022-07-18

## 2022-07-15 RX ORDER — DILTIAZEM HCL 120 MG
1 CAPSULE, EXT RELEASE 24 HR ORAL
Qty: 0 | Refills: 0 | DISCHARGE
Start: 2022-07-15

## 2022-07-15 RX ORDER — ALBUTEROL 90 UG/1
2 AEROSOL, METERED ORAL EVERY 6 HOURS
Refills: 0 | Status: DISCONTINUED | OUTPATIENT
Start: 2022-07-15 | End: 2022-07-18

## 2022-07-15 RX ORDER — APIXABAN 2.5 MG/1
1 TABLET, FILM COATED ORAL
Qty: 0 | Refills: 0 | DISCHARGE
Start: 2022-07-15

## 2022-07-15 RX ORDER — APIXABAN 2.5 MG/1
5 TABLET, FILM COATED ORAL EVERY 12 HOURS
Refills: 0 | Status: DISCONTINUED | OUTPATIENT
Start: 2022-07-15 | End: 2022-07-18

## 2022-07-15 RX ORDER — DILTIAZEM HCL 120 MG
120 CAPSULE, EXT RELEASE 24 HR ORAL DAILY
Refills: 0 | Status: DISCONTINUED | OUTPATIENT
Start: 2022-07-15 | End: 2022-07-18

## 2022-07-15 RX ADMIN — Medication 120 MILLIGRAM(S): at 06:53

## 2022-07-15 RX ADMIN — LIDOCAINE 1 PATCH: 4 CREAM TOPICAL at 11:53

## 2022-07-15 RX ADMIN — LIDOCAINE 1 PATCH: 4 CREAM TOPICAL at 00:51

## 2022-07-15 RX ADMIN — ENOXAPARIN SODIUM 80 MILLIGRAM(S): 100 INJECTION SUBCUTANEOUS at 06:52

## 2022-07-15 RX ADMIN — Medication 50 MILLIGRAM(S): at 17:10

## 2022-07-15 RX ADMIN — Medication 120 MILLIGRAM(S): at 17:10

## 2022-07-15 RX ADMIN — Medication 75 MILLIGRAM(S): at 06:57

## 2022-07-15 RX ADMIN — APIXABAN 5 MILLIGRAM(S): 2.5 TABLET, FILM COATED ORAL at 17:10

## 2022-07-15 NOTE — PROGRESS NOTE ADULT - SUBJECTIVE AND OBJECTIVE BOX
Patient is a 72y old  Female who presents with a chief complaint of AF (14 Jul 2022 11:38)      INTERVAL /OVERNIGHT EVENTS: feels better    MEDICATIONS  (STANDING):  apixaban 5 milliGRAM(s) Oral every 12 hours  diltiazem    milliGRAM(s) Oral daily  lidocaine   4% Patch 1 Patch Transdermal daily  metoprolol succinate ER 50 milliGRAM(s) Oral daily    MEDICATIONS  (PRN):  acetaminophen     Tablet .. 650 milliGRAM(s) Oral every 6 hours PRN Mild Pain (1 - 3)  ALBUTerol    90 MICROgram(s) HFA Inhaler 2 Puff(s) Inhalation every 6 hours PRN Shortness of Breath and/or Wheezing  traMADol 25 milliGRAM(s) Oral every 4 hours PRN Moderate Pain (4 - 6)      Allergies    eggs (Unknown)  No Known Drug Allergies    Intolerances        REVIEW OF SYSTEMS:  CONSTITUTIONAL: No fever, weight loss, or fatigue  EYES: No eye pain, visual disturbances, or discharge  ENMT:  No difficulty hearing, tinnitus, vertigo; No sinus or throat pain  NECK: No pain or stiffness  RESPIRATORY: No cough, wheezing, chills or hemoptysis; No shortness of breath  CARDIOVASCULAR: No chest pain, palpitations, dizziness, or leg swelling  GASTROINTESTINAL: No abdominal or epigastric pain. No nausea, vomiting, or hematemesis; No diarrhea or constipation. No melena or hematochezia.  GENITOURINARY: No dysuria, frequency, hematuria, or incontinence  NEUROLOGICAL: No headaches, memory loss, loss of strength, numbness, or tremors  SKIN: No itching, burning, rashes, or lesions   LYMPH NODES: No enlarged glands  ENDOCRINE: No heat or cold intolerance; No hair loss; No polydipsia or polyuria  MUSCULOSKELETAL: No joint pain or swelling; No muscle, back, or extremity pain  PSYCHIATRIC: No depression, anxiety, mood swings, or difficulty sleeping  HEME/LYMPH: No easy bruising, or bleeding gums  ALLERGY AND IMMUNOLOGIC: No hives or eczema    Vital Signs Last 24 Hrs  T(C): 36.5 (15 Jul 2022 12:35), Max: 36.8 (15 Jul 2022 05:06)  T(F): 97.7 (15 Jul 2022 12:35), Max: 98.3 (15 Jul 2022 05:06)  HR: 64 (15 Jul 2022 12:35) (61 - 74)  BP: 132/74 (15 Jul 2022 12:35) (92/66 - 160/84)  BP(mean): --  RR: 18 (15 Jul 2022 12:35) (17 - 18)  SpO2: 97% (15 Jul 2022 12:35) (87% - 98%)    Parameters below as of 15 Jul 2022 12:12  Patient On (Oxygen Delivery Method): room air        PHYSICAL EXAM:  GENERAL: NAD, well-groomed, well-developed  HEAD:  Atraumatic, Normocephalic  EYES: EOMI, PERRLA, conjunctiva and sclera clear  ENMT: No tonsillar erythema, exudates, or enlargement; Moist mucous membranes, Good dentition, No lesions  NECK: Supple, No JVD, Normal thyroid  NERVOUS SYSTEM:  Alert & Oriented X3, Good concentration; Motor Strength 5/5 B/L upper and lower extremities; DTRs 2+ intact and symmetric  CHEST/LUNG: Clear to auscultation bilaterally; No rales, rhonchi, wheezing, or rubs  HEART: Regular rate and rhythm; No murmurs, rubs, or gallops  ABDOMEN: Soft, Nontender, Nondistended; Bowel sounds present  EXTREMITIES:  2+ Peripheral Pulses, No clubbing, cyanosis, or edema  LYMPH: No lymphadenopathy noted  SKIN: No rashes or lesions    LABS:                        14.5   3.87  )-----------( 273      ( 15 Jul 2022 09:05 )             42.6       Ca    9.1        14 Jul 2022 06:55          CAPILLARY BLOOD GLUCOSE          RADIOLOGY & ADDITIONAL TESTS:    Notes Reviewed:  [x ] YES  [ ] NO    Care Discussed with Consultants/Other Providers [x ] YES  [ ] NO

## 2022-07-15 NOTE — PROGRESS NOTE ADULT - SUBJECTIVE AND OBJECTIVE BOX
[facility] -- Velma Benitez, Cherelle, Bernie, Alfredo John Savella, Goodger  Office # [phone]    Follow Up:  Afib with RVR    Subjective/Observations: Sitting on the chair, tolerating RA.  Denies any respiratory or cardiac discomfort    REVIEW OF SYSTEMS: All other review of systems is negative unless indicated above  PAST MEDICAL & SURGICAL HISTORY:  HLD (hyperlipidemia)    MEDICATIONS  (STANDING):  apixaban 5 milliGRAM(s) Oral every 12 hours  diltiazem    milliGRAM(s) Oral daily  lidocaine   4% Patch 1 Patch Transdermal daily  metoprolol succinate ER 50 milliGRAM(s) Oral daily    MEDICATIONS  (PRN):  acetaminophen     Tablet .. 650 milliGRAM(s) Oral every 6 hours PRN Mild Pain (1 - 3)  ALBUTerol    90 MICROgram(s) HFA Inhaler 2 Puff(s) Inhalation every 6 hours PRN Shortness of Breath and/or Wheezing  traMADol 25 milliGRAM(s) Oral every 4 hours PRN Moderate Pain (4 - 6)    Allergies    eggs (Unknown)  No Known Drug Allergies    Intolerances    Vital Signs Last 24 Hrs  T(C): 36.5 (15 Jul 2022 12:35), Max: 36.8 (15 Jul 2022 05:06)  T(F): 97.7 (15 Jul 2022 12:35), Max: 98.3 (15 Jul 2022 05:06)  HR: 64 (15 Jul 2022 12:35) (61 - 74)  BP: 132/74 (15 Jul 2022 12:35) (92/66 - 160/84)  BP(mean): --  RR: 18 (15 Jul 2022 12:35) (17 - 18)  SpO2: 97% (15 Jul 2022 12:35) (87% - 98%)    Parameters below as of 15 Jul 2022 12:12  Patient On (Oxygen Delivery Method): room air  I&O's Summary      PHYSICAL EXAM:  TELE: NSR  Constitutional: NAD, awake and alert, well-developed  HEENT: Moist Mucous Membranes, Anicteric  Pulmonary: Non-labored, breath sounds are clear bilaterally but diminished at bases, No wheezing, rales or rhonchi  Cardiovascular: Regular, S1 and S2, No murmurs, rubs, gallops or clicks  Gastrointestinal: Bowel Sounds present, soft, nontender.   Lymph: No peripheral edema. No lymphadenopathy.  Skin: No visible rashes or ulcers.  Psych:  Mood & affect appropriate  LABS: All Labs Reviewed:                        14.5   3.87  )-----------( 273      ( 15 Jul 2022 09:05 )             42.6                         13.4   3.03  )-----------( 237      ( 14 Jul 2022 06:55 )             38.9                         13.0   2.52  )-----------( 241      ( 13 Jul 2022 09:40 )             37.7     14 Jul 2022 06:55    137    |  104    |  14     ----------------------------<  91     4.3     |  26     |  0.58   13 Jul 2022 09:40    132    |  100    |  12     ----------------------------<  134    3.4     |  25     |  0.84     Ca    9.1        14 Jul 2022 06:55  Ca    8.8        13 Jul 2022 09:40  Mg     1.8       14 Jul 2022 06:55    TPro  6.8    /  Alb  3.4    /  TBili  0.6    /  DBili  x      /  AST  34     /  ALT  36     /  AlkPhos  70     13 Jul 2022 09:40      ACC: [id number] EXAM:  ECHO TTE WO CON COMP W DOPP                          PROCEDURE DATE:  07/13/2022          INTERPRETATION:  INDICATION: Abnormal EKG  Sonographer AP    Blood Pressure 115/75    Height 162 cm     Weight 79 kg       BSA 1.8 sq m    Dimensions:  LA 3.4       Normal Values: 2.0 - 4.0 cm  Ao 3.2        Normal Values: 2.0 - 3.8 cm  SEPTUM 1.0       Normal Values: 0.6 - 1.2 cm  PWT 0.5       Normal Values: 0.6 - 1.1 cm  LVIDd 4.4         Normal Values: 3.0 - 5.6 cm  LVIDs 3.0  Normal Values: 1.8 - 4.0 cm    OBSERVATIONS:  Mitral Valve: normal, trace physiologic MR.  Aortic Valve/Aorta: normal trileaflet aortic valve.  Tricuspid Valve: Mild TR.  Pulmonic Valve: Not well-visualized  Left Atrium: normal  Right Atrium: Notwell-visualized  Left Ventricle: normal LV size and systolic function, estimated LVEF of   60%.  Right Ventricle: Grossly normal size and systolic function.  Pericardium: no significant pericardial effusion.    IMPRESSION:  Normal left ventricular internal dimensions and systolic function,   estimated LVEF of 60%.  Grossly normal RV size and systolic function.  Normal trileaflet aortic valve, without AI.  Trace physiologic MR  Mild TR.  No significant pericardial effusion.    --- End of Report ---    MARCOS CALIXTO MD; Attending Cardiologist  This document has been electronically signed. Jul 14 2022 12:54PM    ACC: [id number] EXAM:  XR CHEST AP OR PA 1V                          PROCEDURE DATE:  07/11/2022      INTERPRETATION:  TIME OF EXAM: July 11, 2022 at 11:25 AM.    CLINICAL INFORMATION: Fall.    COMPARISON:  None available    TECHNIQUE:   AP Portable chest x-ray.    INTERPRETATION:    Heart size and the mediastinum cannot be accurately evaluated on this   projection.  There is linear atelectasis versus scar in the left lower lung. The lungs   are otherwise clear.  No pleural effusion or pneumothorax is seen.  No acute displaced fracture is seen.      IMPRESSION:  Linear atelectasis versus scar in the left lower lung.    No acute displaced fracture is seen.    --- End of Report ---    ASTRID COLON MD; Attending Radiologist  This document has been electronically signed. Jul 12 2022  5:27PM    Ventricular Rate 139 BPM    QRS Duration 68 ms    Q-T Interval 262 ms    QTC Calculation(Bazett) 398 ms    R Axis 16 degrees    T Axis -65 degrees    Diagnosis Line Atrial fibrillation with rapid ventricular response  ST & T wave abnormality, consider inferior ischemia  Abnormal ECG  No previous ECGs available  Confirmed by alejandro Rebolledo ([id number]) on 7/11/2022 2:44:06 PM

## 2022-07-15 NOTE — PROGRESS NOTE ADULT - ASSESSMENT
73 y/o F with HLD presented to the ED after she was found on the floor today with incidental finding of COVID Pna and new onset of Afib with RVR.     New Onset of Afib/s/p Fall  - No known Hx of Afib.  EKG showed RVR at 120's, likely, in the setting of pulmonary infection/dehydration  - Converted to NSR.  No further Afib on tele.  Can D/C  - Continue Toprol XL and Cardizem CD at same dose  - Continue Eliquis   - Monitor and replete lytes, keep K>4, Mg>2.  - TSH wnl  - TTE showed normal LV and RV size and systolic function, with EF 60%  - No evidence of volume overload  - No anginal complaints  - BP labile at systolic 's, though, hypotension can be an outlier    - Fall appears to be mechanical, not a true fall.  No syncope    - Will continue to follow.    Keena Gibbs DNP, NP-C  Cardiology   Spectra #[phone]

## 2022-07-16 ENCOUNTER — TRANSCRIPTION ENCOUNTER (OUTPATIENT)
Age: 72
End: 2022-07-16

## 2022-07-16 LAB — SARS-COV-2 RNA SPEC QL NAA+PROBE: DETECTED

## 2022-07-16 PROCEDURE — 99232 SBSQ HOSP IP/OBS MODERATE 35: CPT

## 2022-07-16 RX ADMIN — APIXABAN 5 MILLIGRAM(S): 2.5 TABLET, FILM COATED ORAL at 17:44

## 2022-07-16 RX ADMIN — APIXABAN 5 MILLIGRAM(S): 2.5 TABLET, FILM COATED ORAL at 05:22

## 2022-07-16 RX ADMIN — LIDOCAINE 1 PATCH: 4 CREAM TOPICAL at 23:43

## 2022-07-16 RX ADMIN — Medication 50 MILLIGRAM(S): at 05:22

## 2022-07-16 RX ADMIN — Medication 120 MILLIGRAM(S): at 05:22

## 2022-07-16 RX ADMIN — LIDOCAINE 1 PATCH: 4 CREAM TOPICAL at 11:19

## 2022-07-16 RX ADMIN — LIDOCAINE 1 PATCH: 4 CREAM TOPICAL at 19:54

## 2022-07-16 NOTE — PROGRESS NOTE ADULT - SUBJECTIVE AND OBJECTIVE BOX
Patient is a 72y old  Female who presents with a chief complaint of fall (15 Jul 2022 11:29)      INTERVAL /OVERNIGHT EVENTS: + cough    MEDICATIONS  (STANDING):  apixaban 5 milliGRAM(s) Oral every 12 hours  diltiazem    milliGRAM(s) Oral daily  lidocaine   4% Patch 1 Patch Transdermal daily  metoprolol succinate ER 50 milliGRAM(s) Oral daily    MEDICATIONS  (PRN):  acetaminophen     Tablet .. 650 milliGRAM(s) Oral every 6 hours PRN Mild Pain (1 - 3)  ALBUTerol    90 MICROgram(s) HFA Inhaler 2 Puff(s) Inhalation every 6 hours PRN Shortness of Breath and/or Wheezing  guaiFENesin Oral Liquid (Sugar-Free) 100 milliGRAM(s) Oral every 6 hours PRN Cough  traMADol 25 milliGRAM(s) Oral every 4 hours PRN Moderate Pain (4 - 6)      Allergies    eggs (Unknown)  No Known Drug Allergies    Intolerances        REVIEW OF SYSTEMS:  CONSTITUTIONAL: No fever, weight loss, or fatigue  EYES: No eye pain, visual disturbances, or discharge  ENMT:  No difficulty hearing, tinnitus, vertigo; No sinus or throat pain  NECK: No pain or stiffness  RESPIRATORY: + cough, wheezing, chills or hemoptysis; No shortness of breath  CARDIOVASCULAR: No chest pain, palpitations, dizziness, or leg swelling  GASTROINTESTINAL: No abdominal or epigastric pain. No nausea, vomiting, or hematemesis; No diarrhea or constipation. No melena or hematochezia.  GENITOURINARY: No dysuria, frequency, hematuria, or incontinence  NEUROLOGICAL: No headaches, memory loss, loss of strength, numbness, or tremors  SKIN: No itching, burning, rashes, or lesions   LYMPH NODES: No enlarged glands  ENDOCRINE: No heat or cold intolerance; No hair loss; No polydipsia or polyuria  MUSCULOSKELETAL: No joint pain or swelling; No muscle, back, or extremity pain  PSYCHIATRIC: No depression, anxiety, mood swings, or difficulty sleeping  HEME/LYMPH: No easy bruising, or bleeding gums  ALLERGY AND IMMUNOLOGIC: No hives or eczema    Vital Signs Last 24 Hrs  T(C): 37.2 (16 Jul 2022 10:02), Max: 37.2 (16 Jul 2022 10:02)  T(F): 99 (16 Jul 2022 10:02), Max: 99 (16 Jul 2022 10:02)  HR: 68 (16 Jul 2022 10:02) (66 - 68)  BP: 130/77 (16 Jul 2022 10:02) (110/59 - 130/77)  BP(mean): --  RR: 18 (16 Jul 2022 10:02) (17 - 18)  SpO2: 96% (16 Jul 2022 10:02) (96% - 97%)    Parameters below as of 16 Jul 2022 10:02  Patient On (Oxygen Delivery Method): room air        PHYSICAL EXAM:  GENERAL: NAD, well-groomed, well-developed  HEAD:  Atraumatic, Normocephalic  EYES: EOMI, PERRLA, conjunctiva and sclera clear  ENMT: No tonsillar erythema, exudates, or enlargement; Moist mucous membranes, Good dentition, No lesions  NECK: Supple, No JVD, Normal thyroid  NERVOUS SYSTEM:  Alert & Oriented X3, Good concentration; Motor Strength 5/5 B/L upper and lower extremities; DTRs 2+ intact and symmetric  CHEST/LUNG: Clear to auscultation bilaterally; No rales, rhonchi, wheezing, or rubs  HEART: Regular rate and rhythm; No murmurs, rubs, or gallops  ABDOMEN: Soft, Nontender, Nondistended; Bowel sounds present  EXTREMITIES:  2+ Peripheral Pulses, No clubbing, cyanosis, or edema  LYMPH: No lymphadenopathy noted  SKIN: No rashes or lesions    LABS:              CAPILLARY BLOOD GLUCOSE          RADIOLOGY & ADDITIONAL TESTS:    Notes Reviewed:  [x ] YES  [ ] NO    Care Discussed with Consultants/Other Providers [x ] YES  [ ] NO

## 2022-07-16 NOTE — DISCHARGE NOTE NURSING/CASE MANAGEMENT/SOCIAL WORK - NSDCPEFALRISK_GEN_ALL_CORE
For information on Fall & Injury Prevention, visit: [email or web address] OR  [email or web address] OR  https://www.cdc.gov/steadi/patient.html

## 2022-07-16 NOTE — PROGRESS NOTE ADULT - SUBJECTIVE AND OBJECTIVE BOX
[facility] -- Velma Benitez, Cherelle, Bernie, Alfredo John Savella  Office # [phone]      Follow Up:  HLD AF    Subjective/Observations: Patient seen and examined. Events noted. Resting comfortably in bed. No complaints of chest pain, dyspnea, or palpitations reported. No signs of orthopnea or PND. Still coughing.       REVIEW OF SYSTEMS: All other review of systems is negative unless indicated above    PAST MEDICAL & SURGICAL HISTORY:  HLD (hyperlipidemia)      HLD (hyperlipidemia)          MEDICATIONS  (STANDING):  apixaban 5 milliGRAM(s) Oral every 12 hours  diltiazem    milliGRAM(s) Oral daily  lidocaine   4% Patch 1 Patch Transdermal daily  metoprolol succinate ER 50 milliGRAM(s) Oral daily    MEDICATIONS  (PRN):  acetaminophen     Tablet .. 650 milliGRAM(s) Oral every 6 hours PRN Mild Pain (1 - 3)  ALBUTerol    90 MICROgram(s) HFA Inhaler 2 Puff(s) Inhalation every 6 hours PRN Shortness of Breath and/or Wheezing  guaiFENesin Oral Liquid (Sugar-Free) 100 milliGRAM(s) Oral every 6 hours PRN Cough  traMADol 25 milliGRAM(s) Oral every 4 hours PRN Moderate Pain (4 - 6)      Allergies    eggs (Unknown)  No Known Drug Allergies    Intolerances            Vital Signs Last 24 Hrs  T(C): 37.2 (16 Jul 2022 10:02), Max: 37.2 (16 Jul 2022 10:02)  T(F): 99 (16 Jul 2022 10:02), Max: 99 (16 Jul 2022 10:02)  HR: 68 (16 Jul 2022 10:02) (66 - 68)  BP: 130/77 (16 Jul 2022 10:02) (110/59 - 130/77)  BP(mean): --  RR: 18 (16 Jul 2022 10:02) (17 - 18)  SpO2: 96% (16 Jul 2022 10:02) (96% - 97%)    Parameters below as of 16 Jul 2022 10:02  Patient On (Oxygen Delivery Method): room air        I&O's Summary    16 Jul 2022 07:01  -  16 Jul 2022 13:43  --------------------------------------------------------  IN: 0 mL / OUT: 350 mL / NET: -350 mL          PHYSICAL EXAM:  TELE: off   Constitutional: NAD, awake and alert, well-developed  HEENT: Moist Mucous Membranes, Anicteric  Pulmonary: Decreased breath sounds b/l. No rales, crackles or wheeze appreciated.   Cardiovascular: Regular, S1 and S2, No murmurs, rubs, gallops or clicks  Gastrointestinal: Bowel Sounds present, soft, nontender.   Lymph: No peripheral edema. No lymphadenopathy.  Skin: No visible rashes or ulcers.  Psych:  Mood & affect appropriate for situation    LABS: All Labs Reviewed:                        14.5   3.87  )-----------( 273      ( 15 Jul 2022 09:05 )             42.6                         13.4   3.03  )-----------( 237      ( 14 Jul 2022 06:55 )             38.9     14 Jul 2022 06:55    137    |  104    |  14     ----------------------------<  91     4.3     |  26     |  0.58     Ca    9.1        14 Jul 2022 06:55  Mg     1.8       14 Jul 2022 06:55

## 2022-07-16 NOTE — PROGRESS NOTE ADULT - SUBJECTIVE AND OBJECTIVE BOX
Neurology follow up note    VALDO GONZÁLES72yFemale      Interval History:    Patient with cough     Allergies    eggs (Unknown)  No Known Drug Allergies    Intolerances        MEDICATIONS    acetaminophen     Tablet .. 650 milliGRAM(s) Oral every 6 hours PRN  ALBUTerol    90 MICROgram(s) HFA Inhaler 2 Puff(s) Inhalation every 6 hours PRN  apixaban 5 milliGRAM(s) Oral every 12 hours  diltiazem    milliGRAM(s) Oral daily  guaiFENesin Oral Liquid (Sugar-Free) 100 milliGRAM(s) Oral every 6 hours PRN  lidocaine   4% Patch 1 Patch Transdermal daily  metoprolol succinate ER 50 milliGRAM(s) Oral daily  traMADol 25 milliGRAM(s) Oral every 4 hours PRN              Vital Signs Last 24 Hrs  T(C): 37.2 (16 Jul 2022 10:02), Max: 37.2 (16 Jul 2022 10:02)  T(F): 99 (16 Jul 2022 10:02), Max: 99 (16 Jul 2022 10:02)  HR: 68 (16 Jul 2022 10:02) (64 - 68)  BP: 130/77 (16 Jul 2022 10:02) (110/59 - 132/74)  BP(mean): --  RR: 18 (16 Jul 2022 10:02) (17 - 18)  SpO2: 96% (16 Jul 2022 10:02) (96% - 97%)    Parameters below as of 16 Jul 2022 10:02  Patient On (Oxygen Delivery Method): room air      REVIEW OF SYSTEMS:  Constitutionally, the patient denies fever, chills, or night sweats.  Head:  No headaches.  Eyes:  No double vision or blurry vision.  Ears:  No ringing in the ears.  Neck:  No neck pain.  Cardiovascular:  No chest pain.  Respiratory:  No shortness of breath.  Abdomen:  No nausea, vomiting, or abdominal pain.  Genitourinary:  No burning upon urination.  Extremities/Neurological:  No numbness or tingling.  Musculoskeletal:  Occasional joint pain, suspect from arthritis.      PHYSICAL EXAMINATION:   HEENT:  Head:  Normocephalic, atraumatic.  Eyes:  No scleral icterus.  Ears:  Hearing bilaterally intact.  NECK:  Supple.  CARDIOVASCULAR:  S1 and S2 are heard.  RESPIRATORY:  Good air entry bilaterally.  ABDOMEN:  Soft, nontender.  EXTREMITIES:  No clubbing or cyanosis was noted.     NEUROLOGIC:  The patient is awake and alert.  Location was [facility], year was 2020, and month was July.   Extraocular movements were intact.  Speech was fluent.  Smile symmetric.  Motor:  Bilateral upper 4+/5, bilateral lower 4/5.  Sensory:  Bilateral upper and lower intact to light touch.  No drift.                  LABS:  CBC Full  -  ( 15 Jul 2022 09:05 )  WBC Count : 3.87 K/uL  RBC Count : 4.93 M/uL  Hemoglobin : 14.5 g/dL  Hematocrit : 42.6 %  Platelet Count - Automated : 273 K/uL  Mean Cell Volume : 86.4 fl  Mean Cell Hemoglobin : 29.4 pg  Mean Cell Hemoglobin Concentration : 34.0 gm/dL  Auto Neutrophil # : x  Auto Lymphocyte # : x  Auto Monocyte # : x  Auto Eosinophil # : x  Auto Basophil # : x  Auto Neutrophil % : x  Auto Lymphocyte % : x  Auto Monocyte % : x  Auto Eosinophil % : x  Auto Basophil % : x            Hemoglobin A1C:       Vitamin B12 Vitamin B12, Serum: 678 pg/mL (07-15 @ 11:45)          RADIOLOGY        ANALYSIS AND PLAN:  This is a 72-year-old with episode of fall and memory issues.  For episode of fall, this appears to be mechanical in nature, most likely secondary to age-related changes and deconditioning.  The patient is mostly sedentary.  No clear signs on examination to suggest a new cerebrovascular accident has ensued.  For memory issues, suspect most likely mild cognitive impairment versus subtle dementia, supportive  For history of hyperlipidemia, continue the patient on statin.    Spoke with son in great detail, Hema, his telephone number is [phone] 7/16 .  We will have outpatient neurology followup in regards to memory issues and possibly medication.    Greater than 40 minutes of time was spent with the patient, planning care, reviewing data, and speaking to multidisciplinary healthcare team with greater than 50% of that time in counseling and care coordination.      Thank you for the courtesy of this consultation.

## 2022-07-16 NOTE — PROGRESS NOTE ADULT - ASSESSMENT
71 y/o F with HLD presented to the ED after she was found on the floor today with incidental finding of COVID Pna and new onset of Afib with RVR.     New Onset of Afib/s/p Fall  - No known Hx of Afib.  EKG showed RVR at 120's, likely, in the setting of pulmonary infection/dehydration  - Converted to NSR.  No further Afib on tele. now off of tele   - Continue Toprol XL and Cardizem CD at same dose  - Continue Eliquis   - Monitor and replete lytes, keep K>4, Mg>2.  - TSH wnl  - TTE showed normal LV and RV size and systolic function, with EF 60%  - No evidence of volume overload. cough likely from covid  - No anginal complaints  - BP controlled.     - Fall appears to be mechanical, not a true fall.  No syncope    - Will continue to follow.

## 2022-07-16 NOTE — DISCHARGE NOTE NURSING/CASE MANAGEMENT/SOCIAL WORK - PATIENT PORTAL LINK FT
You can access the FollowMyHealth Patient Portal offered by [facility] by registering at the following website: [email or web address]. By joining [employer]’s FollowMyHealth portal, you will also be able to view your health information using other applications (apps) compatible with our system.

## 2022-07-17 PROCEDURE — 99232 SBSQ HOSP IP/OBS MODERATE 35: CPT

## 2022-07-17 RX ADMIN — Medication 600 MILLIGRAM(S): at 17:35

## 2022-07-17 RX ADMIN — APIXABAN 5 MILLIGRAM(S): 2.5 TABLET, FILM COATED ORAL at 06:45

## 2022-07-17 RX ADMIN — Medication 100 MILLIGRAM(S): at 17:31

## 2022-07-17 RX ADMIN — Medication 120 MILLIGRAM(S): at 06:45

## 2022-07-17 RX ADMIN — APIXABAN 5 MILLIGRAM(S): 2.5 TABLET, FILM COATED ORAL at 17:31

## 2022-07-17 RX ADMIN — LIDOCAINE 1 PATCH: 4 CREAM TOPICAL at 19:24

## 2022-07-17 RX ADMIN — LIDOCAINE 1 PATCH: 4 CREAM TOPICAL at 11:56

## 2022-07-17 RX ADMIN — Medication 50 MILLIGRAM(S): at 06:45

## 2022-07-17 NOTE — PROGRESS NOTE ADULT - SUBJECTIVE AND OBJECTIVE BOX
[facility] -- Velma Benitez, Cherelle, Bernie, Alfredo John Savella  Office # [phone]      Follow Up:  AF COVID     Subjective/Observations: Patient seen and examined. Events noted. Resting comfortably in bed. No complaints of chest pain, dyspnea, or palpitations reported. No signs of orthopnea or PND. Cough improving.       REVIEW OF SYSTEMS: All other review of systems is negative unless indicated above    PAST MEDICAL & SURGICAL HISTORY:  HLD (hyperlipidemia)      HLD (hyperlipidemia)          MEDICATIONS  (STANDING):  apixaban 5 milliGRAM(s) Oral every 12 hours  diltiazem    milliGRAM(s) Oral daily  guaiFENesin  milliGRAM(s) Oral every 12 hours  lidocaine   4% Patch 1 Patch Transdermal daily  metoprolol succinate ER 50 milliGRAM(s) Oral daily    MEDICATIONS  (PRN):  acetaminophen     Tablet .. 650 milliGRAM(s) Oral every 6 hours PRN Mild Pain (1 - 3)  ALBUTerol    90 MICROgram(s) HFA Inhaler 2 Puff(s) Inhalation every 6 hours PRN Shortness of Breath and/or Wheezing  guaiFENesin Oral Liquid (Sugar-Free) 100 milliGRAM(s) Oral every 6 hours PRN Cough  traMADol 25 milliGRAM(s) Oral every 4 hours PRN Moderate Pain (4 - 6)      Allergies    eggs (Unknown)  No Known Drug Allergies    Intolerances            Vital Signs Last 24 Hrs  T(C): 36.5 (17 Jul 2022 10:45), Max: 37.3 (16 Jul 2022 20:14)  T(F): 97.7 (17 Jul 2022 10:45), Max: 99.1 (16 Jul 2022 20:14)  HR: 77 (17 Jul 2022 10:45) (65 - 78)  BP: 100/61 (17 Jul 2022 10:45) (100/61 - 142/90)  BP(mean): --  RR: 16 (17 Jul 2022 10:45) (16 - 18)  SpO2: 97% (17 Jul 2022 10:45) (97% - 97%)    Parameters below as of 17 Jul 2022 10:45  Patient On (Oxygen Delivery Method): room air        I&O's Summary    16 Jul 2022 07:01  -  17 Jul 2022 07:00  --------------------------------------------------------  IN: 0 mL / OUT: 350 mL / NET: -350 mL    17 Jul 2022 07:01  -  17 Jul 2022 17:27  --------------------------------------------------------  IN: 420 mL / OUT: 0 mL / NET: 420 mL          PHYSICAL EXAM:  TELE: off  Constitutional: NAD, awake and alert, well-developed  HEENT: Moist Mucous Membranes, Anicteric  Pulmonary: Decreased breath sounds b/l. No rales, crackles or wheeze appreciated.   Cardiovascular: Regular, S1 and S2, No murmurs, rubs, gallops or clicks  Gastrointestinal: Bowel Sounds present, soft, nontender.   Lymph: No peripheral edema. No lymphadenopathy.  Skin: No visible rashes or ulcers.  Psych:  Mood & affect appropriate for situation    LABS: All Labs Reviewed:                        14.5   3.87  )-----------( 273      ( 15 Jul 2022 09:05 )             42.6

## 2022-07-17 NOTE — PROGRESS NOTE ADULT - SUBJECTIVE AND OBJECTIVE BOX
Neurology follow up note    VALDO GONZÁLES72yFemale      Interval History:    Patient feels ok no new complaints.    Allergies    eggs (Unknown)  No Known Drug Allergies    Intolerances        MEDICATIONS    acetaminophen     Tablet .. 650 milliGRAM(s) Oral every 6 hours PRN  ALBUTerol    90 MICROgram(s) HFA Inhaler 2 Puff(s) Inhalation every 6 hours PRN  apixaban 5 milliGRAM(s) Oral every 12 hours  diltiazem    milliGRAM(s) Oral daily  guaiFENesin Oral Liquid (Sugar-Free) 100 milliGRAM(s) Oral every 6 hours PRN  lidocaine   4% Patch 1 Patch Transdermal daily  metoprolol succinate ER 50 milliGRAM(s) Oral daily  traMADol 25 milliGRAM(s) Oral every 4 hours PRN              Vital Signs Last 24 Hrs  T(C): 36.4 (17 Jul 2022 06:43), Max: 37.3 (16 Jul 2022 20:14)  T(F): 97.6 (17 Jul 2022 06:43), Max: 99.1 (16 Jul 2022 20:14)  HR: 78 (17 Jul 2022 06:43) (65 - 78)  BP: 142/90 (17 Jul 2022 06:43) (127/67 - 142/90)  BP(mean): --  RR: 18 (17 Jul 2022 06:43) (18 - 18)  SpO2: 97% (17 Jul 2022 06:43) (96% - 97%)    Parameters below as of 17 Jul 2022 06:43  Patient On (Oxygen Delivery Method): room air      REVIEW OF SYSTEMS:  Constitutionally, the patient denies fever, chills, or night sweats.  Head:  No headaches.  Eyes:  No double vision or blurry vision.  Ears:  No ringing in the ears.  Neck:  No neck pain.  Cardiovascular:  No chest pain.  Respiratory:  No shortness of breath.  Abdomen:  No nausea, vomiting, or abdominal pain.  Genitourinary:  No burning upon urination.  Extremities/Neurological:  No numbness or tingling.  Musculoskeletal:  Occasional joint pain, suspect from arthritis.      PHYSICAL EXAMINATION:   HEENT:  Head:  Normocephalic, atraumatic.  Eyes:  No scleral icterus.  Ears:  Hearing bilaterally intact.  NECK:  Supple.  CARDIOVASCULAR:  S1 and S2 are heard.  RESPIRATORY:  Good air entry bilaterally.  ABDOMEN:  Soft, nontender.  EXTREMITIES:  No clubbing or cyanosis was noted.     NEUROLOGIC:  The patient is awake and alert.  Location was hospital, year was 2020, and month was July.   Extraocular movements were intact.  Speech was fluent.  Smile symmetric.  Motor:  Bilateral upper 4+/5, bilateral lower 4/5.  Sensory:  Bilateral upper and lower intact to light touch.  No drift.                LABS:  CBC Full  -  ( 15 Jul 2022 09:05 )  WBC Count : 3.87 K/uL  RBC Count : 4.93 M/uL  Hemoglobin : 14.5 g/dL  Hematocrit : 42.6 %  Platelet Count - Automated : 273 K/uL  Mean Cell Volume : 86.4 fl  Mean Cell Hemoglobin : 29.4 pg  Mean Cell Hemoglobin Concentration : 34.0 gm/dL  Auto Neutrophil # : x  Auto Lymphocyte # : x  Auto Monocyte # : x  Auto Eosinophil # : x  Auto Basophil # : x  Auto Neutrophil % : x  Auto Lymphocyte % : x  Auto Monocyte % : x  Auto Eosinophil % : x  Auto Basophil % : x            Hemoglobin A1C:       Vitamin B12         RADIOLOGY      ANALYSIS AND PLAN:  This is a 72-year-old with episode of fall and memory issues.  For episode of fall, this appears to be mechanical in nature, most likely secondary to age-related changes and deconditioning.  The patient is mostly sedentary.  No clear signs on examination to suggest a new cerebrovascular accident has ensued.  For memory issues, suspect most likely mild cognitive impairment versus subtle dementia, supportive  For history of hyperlipidemia, continue the patient on statin.    Spoke with son in great detail, Hema, his telephone number is [phone] 7/16 .  We will have outpatient neurology followup in regards to memory issues and possibly medication.    Greater than 40 minutes of time was spent with the patient, planning care, reviewing data, and speaking to multidisciplinary healthcare team with greater than 50% of that time in counseling and care coordination.      Thank you for the courtesy of this consultation. Neurology follow up note    VALDO GONZÁLES72yFemale      Interval History:    Patient feels ok no new complaints.    Allergies    eggs (Unknown)  No Known Drug Allergies    Intolerances        MEDICATIONS    acetaminophen     Tablet .. 650 milliGRAM(s) Oral every 6 hours PRN  ALBUTerol    90 MICROgram(s) HFA Inhaler 2 Puff(s) Inhalation every 6 hours PRN  apixaban 5 milliGRAM(s) Oral every 12 hours  diltiazem    milliGRAM(s) Oral daily  guaiFENesin Oral Liquid (Sugar-Free) 100 milliGRAM(s) Oral every 6 hours PRN  lidocaine   4% Patch 1 Patch Transdermal daily  metoprolol succinate ER 50 milliGRAM(s) Oral daily  traMADol 25 milliGRAM(s) Oral every 4 hours PRN              Vital Signs Last 24 Hrs  T(C): 36.4 (17 Jul 2022 06:43), Max: 37.3 (16 Jul 2022 20:14)  T(F): 97.6 (17 Jul 2022 06:43), Max: 99.1 (16 Jul 2022 20:14)  HR: 78 (17 Jul 2022 06:43) (65 - 78)  BP: 142/90 (17 Jul 2022 06:43) (127/67 - 142/90)  BP(mean): --  RR: 18 (17 Jul 2022 06:43) (18 - 18)  SpO2: 97% (17 Jul 2022 06:43) (96% - 97%)    Parameters below as of 17 Jul 2022 06:43  Patient On (Oxygen Delivery Method): room air      REVIEW OF SYSTEMS:  Constitutionally, the patient denies fever, chills, or night sweats.  Head:  No headaches.  Eyes:  No double vision or blurry vision.  Ears:  No ringing in the ears.  Neck:  No neck pain.  Cardiovascular:  No chest pain.  Respiratory:  No shortness of breath.  Abdomen:  No nausea, vomiting, or abdominal pain.  Genitourinary:  No burning upon urination.  Extremities/Neurological:  No numbness or tingling.  Musculoskeletal:  Occasional joint pain, suspect from arthritis.      PHYSICAL EXAMINATION:   HEENT:  Head:  Normocephalic, atraumatic.  Eyes:  No scleral icterus.  Ears:  Hearing bilaterally intact.  NECK:  Supple.  CARDIOVASCULAR:  S1 and S2 are heard.  RESPIRATORY:  Good air entry bilaterally.  ABDOMEN:  Soft, nontender.  EXTREMITIES:  No clubbing or cyanosis was noted.     NEUROLOGIC:  The patient is awake and alert.  Location was hospital, year was 2020, and month was July.   Extraocular movements were intact.  Speech was fluent.  Smile symmetric.  Motor:  Bilateral upper 4+/5, bilateral lower 4/5.  Sensory:  Bilateral upper and lower intact to light touch.  No drift.                LABS:  CBC Full  -  ( 15 Jul 2022 09:05 )  WBC Count : 3.87 K/uL  RBC Count : 4.93 M/uL  Hemoglobin : 14.5 g/dL  Hematocrit : 42.6 %  Platelet Count - Automated : 273 K/uL  Mean Cell Volume : 86.4 fl  Mean Cell Hemoglobin : 29.4 pg  Mean Cell Hemoglobin Concentration : 34.0 gm/dL  Auto Neutrophil # : x  Auto Lymphocyte # : x  Auto Monocyte # : x  Auto Eosinophil # : x  Auto Basophil # : x  Auto Neutrophil % : x  Auto Lymphocyte % : x  Auto Monocyte % : x  Auto Eosinophil % : x  Auto Basophil % : x            Hemoglobin A1C:       Vitamin B12         RADIOLOGY      ANALYSIS AND PLAN:  This is a 72-year-old with episode of fall and memory issues.  For episode of fall, this appears to be mechanical in nature, most likely secondary to age-related changes and deconditioning.  The patient is mostly sedentary.  No clear signs on examination to suggest a new cerebrovascular accident has ensued.  For memory issues, suspect most likely mild cognitive impairment versus subtle dementia, supportive  For history of hyperlipidemia, continue the patient on statin.    Spoke with son in great detail, Hema, his telephone number is [phone] 7/16 .  We will have outpatient neurology followup in regards to memory issues and possibly medication.    Greater than 34 minutes of time was spent with the patient, planning care, reviewing data, and speaking to multidisciplinary healthcare team with greater than 50% of that time in counseling and care coordination.      Thank you for the courtesy of this consultation.

## 2022-07-17 NOTE — PROGRESS NOTE ADULT - PROBLEM SELECTOR PLAN 4
PT follow up   dvt proph  will need STU

## 2022-07-17 NOTE — PROGRESS NOTE ADULT - PROBLEM SELECTOR PLAN 1
rate control with beta blocker and cardizem  AC with lovenox  cardio eval  check TTE
rate control with beta blocker and cardizem  AC with lovenox  cardio eval with dr. borja  check TTE

## 2022-07-17 NOTE — PROGRESS NOTE ADULT - SUBJECTIVE AND OBJECTIVE BOX
Patient is a 72y old  Female who presents with a chief complaint of AF (16 Jul 2022 11:08)      INTERVAL /OVERNIGHT EVENTS: still with cough    MEDICATIONS  (STANDING):  apixaban 5 milliGRAM(s) Oral every 12 hours  diltiazem    milliGRAM(s) Oral daily  guaiFENesin  milliGRAM(s) Oral every 12 hours  lidocaine   4% Patch 1 Patch Transdermal daily  metoprolol succinate ER 50 milliGRAM(s) Oral daily    MEDICATIONS  (PRN):  acetaminophen     Tablet .. 650 milliGRAM(s) Oral every 6 hours PRN Mild Pain (1 - 3)  ALBUTerol    90 MICROgram(s) HFA Inhaler 2 Puff(s) Inhalation every 6 hours PRN Shortness of Breath and/or Wheezing  guaiFENesin Oral Liquid (Sugar-Free) 100 milliGRAM(s) Oral every 6 hours PRN Cough  traMADol 25 milliGRAM(s) Oral every 4 hours PRN Moderate Pain (4 - 6)      Allergies    eggs (Unknown)  No Known Drug Allergies    Intolerances        REVIEW OF SYSTEMS:  CONSTITUTIONAL: No fever, weight loss, or fatigue  EYES: No eye pain, visual disturbances, or discharge  ENMT:  No difficulty hearing, tinnitus, vertigo; No sinus or throat pain  NECK: No pain or stiffness  RESPIRATORY: + cough, wheezing, chills or hemoptysis; No shortness of breath  CARDIOVASCULAR: No chest pain, palpitations, dizziness, or leg swelling  GASTROINTESTINAL: No abdominal or epigastric pain. No nausea, vomiting, or hematemesis; No diarrhea or constipation. No melena or hematochezia.  GENITOURINARY: No dysuria, frequency, hematuria, or incontinence  NEUROLOGICAL: No headaches, memory loss, loss of strength, numbness, or tremors  SKIN: No itching, burning, rashes, or lesions   LYMPH NODES: No enlarged glands  ENDOCRINE: No heat or cold intolerance; No hair loss; No polydipsia or polyuria  MUSCULOSKELETAL: No joint pain or swelling; No muscle, back, or extremity pain  PSYCHIATRIC: No depression, anxiety, mood swings, or difficulty sleeping  HEME/LYMPH: No easy bruising, or bleeding gums  ALLERGY AND IMMUNOLOGIC: No hives or eczema    Vital Signs Last 24 Hrs  T(C): 36.5 (17 Jul 2022 10:45), Max: 37.3 (16 Jul 2022 20:14)  T(F): 97.7 (17 Jul 2022 10:45), Max: 99.1 (16 Jul 2022 20:14)  HR: 77 (17 Jul 2022 10:45) (65 - 78)  BP: 100/61 (17 Jul 2022 10:45) (100/61 - 142/90)  BP(mean): --  RR: 16 (17 Jul 2022 10:45) (16 - 18)  SpO2: 97% (17 Jul 2022 10:45) (97% - 97%)    Parameters below as of 17 Jul 2022 10:45  Patient On (Oxygen Delivery Method): room air        PHYSICAL EXAM:  GENERAL: NAD, well-groomed, well-developed  HEAD:  Atraumatic, Normocephalic  EYES: EOMI, PERRLA, conjunctiva and sclera clear  ENMT: No tonsillar erythema, exudates, or enlargement; Moist mucous membranes, Good dentition, No lesions  NECK: Supple, No JVD, Normal thyroid  NERVOUS SYSTEM:  Alert & Oriented X3, Good concentration; Motor Strength 5/5 B/L upper and lower extremities; DTRs 2+ intact and symmetric  CHEST/LUNG: Clear to auscultation bilaterally; No rales, rhonchi, wheezing, or rubs  HEART: Regular rate and rhythm; No murmurs, rubs, or gallops  ABDOMEN: Soft, Nontender, Nondistended; Bowel sounds present  EXTREMITIES:  2+ Peripheral Pulses, No clubbing, cyanosis, or edema  LYMPH: No lymphadenopathy noted  SKIN: No rashes or lesions    LABS:              CAPILLARY BLOOD GLUCOSE          RADIOLOGY & ADDITIONAL TESTS:    Notes Reviewed:  [x ] YES  [ ] NO    Care Discussed with Consultants/Other Providers [x ] YES  [ ] NO

## 2022-07-17 NOTE — PROGRESS NOTE ADULT - PROBLEM SELECTOR PLAN 3
AC with lovenox initially
AC with lovenox initially
AC with lovenox
AC with lovenox initially

## 2022-07-17 NOTE — PROGRESS NOTE ADULT - PROBLEM SELECTOR PLAN 2
s/p MAB   refused remdesvir
s/p MAB yesterday  refused remdesvir
s/p MAB   refused remdesvir
s/p MAB yesterday  refused remdesvir
s/p MAB yesterday  refused remdesvir
s/p MAB   refused remdesvir

## 2022-07-18 VITALS
DIASTOLIC BLOOD PRESSURE: 75 MMHG | TEMPERATURE: 97 F | OXYGEN SATURATION: 96 % | SYSTOLIC BLOOD PRESSURE: 125 MMHG | HEART RATE: 90 BPM | RESPIRATION RATE: 18 BRPM

## 2022-07-18 PROCEDURE — 84484 ASSAY OF TROPONIN QUANT: CPT

## 2022-07-18 PROCEDURE — 80053 COMPREHEN METABOLIC PANEL: CPT

## 2022-07-18 PROCEDURE — 82607 VITAMIN B-12: CPT

## 2022-07-18 PROCEDURE — 99291 CRITICAL CARE FIRST HOUR: CPT

## 2022-07-18 PROCEDURE — 80048 BASIC METABOLIC PNL TOTAL CA: CPT

## 2022-07-18 PROCEDURE — 71045 X-RAY EXAM CHEST 1 VIEW: CPT

## 2022-07-18 PROCEDURE — 96374 THER/PROPH/DIAG INJ IV PUSH: CPT

## 2022-07-18 PROCEDURE — 85025 COMPLETE CBC W/AUTO DIFF WBC: CPT

## 2022-07-18 PROCEDURE — 73502 X-RAY EXAM HIP UNI 2-3 VIEWS: CPT

## 2022-07-18 PROCEDURE — 97116 GAIT TRAINING THERAPY: CPT

## 2022-07-18 PROCEDURE — 72192 CT PELVIS W/O DYE: CPT | Mod: MA

## 2022-07-18 PROCEDURE — 82746 ASSAY OF FOLIC ACID SERUM: CPT

## 2022-07-18 PROCEDURE — 93306 TTE W/DOPPLER COMPLETE: CPT

## 2022-07-18 PROCEDURE — 97162 PT EVAL MOD COMPLEX 30 MIN: CPT

## 2022-07-18 PROCEDURE — 73610 X-RAY EXAM OF ANKLE: CPT

## 2022-07-18 PROCEDURE — 99232 SBSQ HOSP IP/OBS MODERATE 35: CPT

## 2022-07-18 PROCEDURE — 83735 ASSAY OF MAGNESIUM: CPT

## 2022-07-18 PROCEDURE — U0005: CPT

## 2022-07-18 PROCEDURE — 82553 CREATINE MB FRACTION: CPT

## 2022-07-18 PROCEDURE — 97535 SELF CARE MNGMENT TRAINING: CPT

## 2022-07-18 PROCEDURE — 85027 COMPLETE CBC AUTOMATED: CPT

## 2022-07-18 PROCEDURE — 36415 COLL VENOUS BLD VENIPUNCTURE: CPT

## 2022-07-18 PROCEDURE — 84443 ASSAY THYROID STIM HORMONE: CPT

## 2022-07-18 PROCEDURE — 82550 ASSAY OF CK (CPK): CPT

## 2022-07-18 PROCEDURE — 97112 NEUROMUSCULAR REEDUCATION: CPT

## 2022-07-18 PROCEDURE — 72197 MRI PELVIS W/O & W/DYE: CPT

## 2022-07-18 PROCEDURE — 72100 X-RAY EXAM L-S SPINE 2/3 VWS: CPT

## 2022-07-18 PROCEDURE — 76856 US EXAM PELVIC COMPLETE: CPT

## 2022-07-18 PROCEDURE — 97110 THERAPEUTIC EXERCISES: CPT

## 2022-07-18 PROCEDURE — 96376 TX/PRO/DX INJ SAME DRUG ADON: CPT

## 2022-07-18 PROCEDURE — 97166 OT EVAL MOD COMPLEX 45 MIN: CPT

## 2022-07-18 PROCEDURE — 93005 ELECTROCARDIOGRAM TRACING: CPT

## 2022-07-18 PROCEDURE — 87635 SARS-COV-2 COVID-19 AMP PRB: CPT

## 2022-07-18 PROCEDURE — 85730 THROMBOPLASTIN TIME PARTIAL: CPT

## 2022-07-18 PROCEDURE — 76376 3D RENDER W/INTRP POSTPROCES: CPT

## 2022-07-18 PROCEDURE — U0003: CPT

## 2022-07-18 PROCEDURE — 85610 PROTHROMBIN TIME: CPT

## 2022-07-18 RX ADMIN — Medication 600 MILLIGRAM(S): at 05:36

## 2022-07-18 RX ADMIN — Medication 50 MILLIGRAM(S): at 05:36

## 2022-07-18 RX ADMIN — LIDOCAINE 1 PATCH: 4 CREAM TOPICAL at 11:38

## 2022-07-18 RX ADMIN — APIXABAN 5 MILLIGRAM(S): 2.5 TABLET, FILM COATED ORAL at 05:36

## 2022-07-18 RX ADMIN — Medication 120 MILLIGRAM(S): at 05:36

## 2022-07-18 NOTE — PROGRESS NOTE ADULT - SUBJECTIVE AND OBJECTIVE BOX
Neurology follow up note    VALDO GONZÁLES72yFemale      Interval History:    Patient feels ok no new complaints.    Allergies    eggs (Unknown)  No Known Drug Allergies    Intolerances        MEDICATIONS    acetaminophen     Tablet .. 650 milliGRAM(s) Oral every 6 hours PRN  ALBUTerol    90 MICROgram(s) HFA Inhaler 2 Puff(s) Inhalation every 6 hours PRN  apixaban 5 milliGRAM(s) Oral every 12 hours  diltiazem    milliGRAM(s) Oral daily  guaiFENesin  milliGRAM(s) Oral every 12 hours  guaiFENesin Oral Liquid (Sugar-Free) 100 milliGRAM(s) Oral every 6 hours PRN  lidocaine   4% Patch 1 Patch Transdermal daily  metoprolol succinate ER 50 milliGRAM(s) Oral daily  traMADol 25 milliGRAM(s) Oral every 4 hours PRN              Vital Signs Last 24 Hrs  T(C): 36.7 (18 Jul 2022 05:03), Max: 36.7 (18 Jul 2022 05:03)  T(F): 98 (18 Jul 2022 05:03), Max: 98 (18 Jul 2022 05:03)  HR: 70 (18 Jul 2022 05:03) (69 - 77)  BP: 121/83 (18 Jul 2022 05:03) (100/61 - 121/83)  BP(mean): --  RR: 18 (18 Jul 2022 05:03) (16 - 18)  SpO2: 96% (18 Jul 2022 05:03) (96% - 97%)    Parameters below as of 18 Jul 2022 05:03  Patient On (Oxygen Delivery Method): room air    REVIEW OF SYSTEMS:  Constitutionally, the patient denies fever, chills, or night sweats.  Head:  No headaches.  Eyes:  No double vision or blurry vision.  Ears:  No ringing in the ears.  Neck:  No neck pain.  Cardiovascular:  No chest pain.  Respiratory:  No shortness of breath.  Abdomen:  No nausea, vomiting, or abdominal pain.  Genitourinary:  No burning upon urination.  Extremities/Neurological:  No numbness or tingling.  Musculoskeletal:  Occasional joint pain, suspect from arthritis.      PHYSICAL EXAMINATION:   HEENT:  Head:  Normocephalic, atraumatic.  Eyes:  No scleral icterus.  Ears:  Hearing bilaterally intact.  NECK:  Supple.  CARDIOVASCULAR:  S1 and S2 are heard.  RESPIRATORY:  Good air entry bilaterally.  ABDOMEN:  Soft, nontender.  EXTREMITIES:  No clubbing or cyanosis was noted.     NEUROLOGIC:  The patient is awake and alert.  Location was hospital, year was 2020, and month was July.   Extraocular movements were intact.  Speech was fluent.  Smile symmetric.  Motor:  Bilateral upper 4+/5, bilateral lower 4/5.  Sensory:  Bilateral upper and lower intact to light touch.  No drift.                    LABS:            Hemoglobin A1C:       Vitamin B12         RADIOLOGY    ANALYSIS AND PLAN:  This is a 72-year-old with episode of fall and memory issues.  For episode of fall, this appears to be mechanical in nature, most likely secondary to age-related changes and deconditioning.  The patient is mostly sedentary.  No clear signs on examination to suggest a new cerebrovascular accident has ensued.  For memory issues, suspect most likely mild cognitive impairment versus subtle dementia, supportive  For history of hyperlipidemia, continue the patient on statin.  neurologic wise stable     Spoke with son in great detail, Hema, his telephone number is [phone] 7/16 .  We will have outpatient neurology followup in regards to memory issues and possibly medication.    Greater than 27 minutes of time was spent with the patient, planning care, reviewing data, and speaking to multidisciplinary healthcare team with greater than 50% of that time in counseling and care coordination.      Thank you for the courtesy of this consultation.

## 2022-07-18 NOTE — PROGRESS NOTE ADULT - SUBJECTIVE AND OBJECTIVE BOX
[facility] -- Velma Benitez, Bernie Villarreal, Alfredo John Savella, Goodger  Office # [phone]    Follow Up:  Afib, SOB, s/p Fall    Subjective/Observations: Sitting on the chair, comfortable on RA.  c/o dry cough but denies SOB, HAN, or orthopnea.  Denies chest pain, pleuritic pain, or palpitations    REVIEW OF SYSTEMS: All other review of systems is negative unless indicated above  PAST MEDICAL & SURGICAL HISTORY:  HLD (hyperlipidemia)  HLD (hyperlipidemia)    MEDICATIONS  (STANDING):  apixaban 5 milliGRAM(s) Oral every 12 hours  diltiazem    milliGRAM(s) Oral daily  guaiFENesin  milliGRAM(s) Oral every 12 hours  lidocaine   4% Patch 1 Patch Transdermal daily  metoprolol succinate ER 50 milliGRAM(s) Oral daily    MEDICATIONS  (PRN):  acetaminophen     Tablet .. 650 milliGRAM(s) Oral every 6 hours PRN Mild Pain (1 - 3)  ALBUTerol    90 MICROgram(s) HFA Inhaler 2 Puff(s) Inhalation every 6 hours PRN Shortness of Breath and/or Wheezing  guaiFENesin Oral Liquid (Sugar-Free) 100 milliGRAM(s) Oral every 6 hours PRN Cough  traMADol 25 milliGRAM(s) Oral every 4 hours PRN Moderate Pain (4 - 6)    Allergies    eggs (Unknown)  No Known Drug Allergies    Intolerances  Vital Signs Last 24 Hrs  T(C): 36.7 (18 Jul 2022 05:03), Max: 36.7 (18 Jul 2022 05:03)  T(F): 98 (18 Jul 2022 05:03), Max: 98 (18 Jul 2022 05:03)  HR: 70 (18 Jul 2022 05:03) (69 - 70)  BP: 121/83 (18 Jul 2022 05:03) (109/65 - 121/83)  BP(mean): --  RR: 18 (18 Jul 2022 05:03) (18 - 18)  SpO2: 96% (18 Jul 2022 05:03) (96% - 97%)    Parameters below as of 18 Jul 2022 05:03  Patient On (Oxygen Delivery Method): room air    I&O's Summary    17 Jul 2022 07:01 - 18 Jul 2022 07:00  --------------------------------------------------------  IN: 840 mL / OUT: 0 mL / NET: 840 mL    PHYSICAL EXAM:  TELE: Not on tele  Constitutional: NAD, awake and alert, obese  HEENT: Moist Mucous Membranes, Anicteric  Pulmonary: Non-labored, breath sounds are clear but diminsihed bilaterally, No wheezing, rales or rhonchi  Cardiovascular: RRR, S1 and S2, No murmurs, rubs, gallops or clicks  Gastrointestinal: Bowel Sounds present, soft, nontender.   Lymph: No peripheral edema. No lymphadenopathy.  Skin: No visible rashes or ulcers.  Psych:  Mood & affect appropriate  LABS: All Labs Reviewed:    ACC: [id number] EXAM:  ECHO TTE WO CON COMP W DOPP                          PROCEDURE DATE:  07/13/2022      INTERPRETATION:  INDICATION: Abnormal EKG  Sonographer AP    Blood Pressure 115/75    Height 162 cm     Weight 79 kg       BSA 1.8 sq m    Dimensions:  LA 3.4       Normal Values: 2.0 - 4.0 cm  Ao 3.2        Normal Values: 2.0 - 3.8 cm  SEPTUM 1.0       Normal Values: 0.6 - 1.2 cm  PWT 0.5       Normal Values: 0.6 - 1.1 cm  LVIDd 4.4         Normal Values: 3.0 - 5.6 cm  LVIDs 3.0  Normal Values: 1.8 - 4.0 cm    OBSERVATIONS:  Mitral Valve: normal, trace physiologic MR.  Aortic Valve/Aorta: normal trileaflet aortic valve.  Tricuspid Valve: Mild TR.  Pulmonic Valve: Not well-visualized  Left Atrium: normal  Right Atrium: Notwell-visualized  Left Ventricle: normal LV size and systolic function, estimated LVEF of   60%.  Right Ventricle: Grossly normal size and systolic function.  Pericardium: no significant pericardial effusion.  IMPRESSION:  Normal left ventricular internal dimensions and systolic function,   estimated LVEF of 60%.  Grossly normal RV size and systolic function.  Normal trileaflet aortic valve, without AI.  Trace physiologic MR  Mild TR.  No significant pericardial effusion.    --- End of Report ---    MARCOS CALIXTO MD; Attending Cardiologist  This document has been electronically signed. Jul 14 2022 12:54PM    ACC: [id number] EXAM:  XR CHEST AP OR PA 1V                          PROCEDURE DATE:  07/11/2022      INTERPRETATION:  TIME OF EXAM: July 11, 2022 at 11:25 AM.    CLINICAL INFORMATION: Fall.    COMPARISON:  None available    TECHNIQUE:   AP Portable chest x-ray.    INTERPRETATION:    Heart size and the mediastinum cannot be accurately evaluated on this   projection.  There is linear atelectasis versus scar in the left lower lung. The lungs   are otherwise clear.  No pleural effusion or pneumothorax is seen.  No acute displaced fracture is seen.      IMPRESSION:  Linear atelectasis versus scar in the left lower lung.    No acute displaced fracture is seen.    --- End of Report ---    ASTRID COLON MD; Attending Radiologist  This document has been electronically signed. Jul 12 2022  5:27PM    Ventricular Rate 139 BPM    QRS Duration 68 ms    Q-T Interval 262 ms    QTC Calculation(Bazett) 398 ms    R Axis 16 degrees    T Axis -65 degrees    Diagnosis Line Atrial fibrillation with rapid ventricular response  ST & T wave abnormality, consider inferior ischemia  Abnormal ECG  No previous ECGs available  Confirmed by Jono Rebolledo ([id number]) on 7/11/2022 2:44:06 PM

## 2022-07-18 NOTE — CHART NOTE - NSCHARTNOTEFT_GEN_A_CORE
Do you have Advance Directives (HCP / LV / Organ donation / Documentation of oral advance Directive):   (  x  )  yes    (      )    NO                                                                            Do you have LV - Living will :                                                                                                                                             (  x  )  yes    (      )   No    Do you have HCP - Health Care Proxy:                                                                                                                            (  x   )  yes   (       ) N0    Do you have DNR- Do Not Resuscitate :                                                                                                                           (      )  yes  (  x      )  No    Do you have DNI- Do Not intubate  :                                                                                                                               (      )  yes   (  x     ) No    Do you have MOLST - Medical orders for Life sustaining treatment  :                                                                    (      ) yes    (   x    ) No    Decision Maker :  (  x   ) Patient     (      )  HCA   (     ) Public Health Law Surrogate     (      ) Surrogate  (       ) Guardian    Goals of Care :  (   x   )   Complete Care     (       ) No Limitations                              (       )   Comfort Care       (       )  Hospice                               (      )   Limited medical Intervention / s    Medical Interventions :   (   x     )   CPR       (        )  DNR                                               (    x    )  Intubation with MV - Mechanical Ventilation  (  x    ) BIPAP/CPAP    (         )   DNI                                               (  x       )  Artificial Nutrition -  IVF, TPN / PPN, Tube Feeds             (         )   No Feeding Tube                                                (    x    ) Use Antibiotics                         (          ) No Antibiotics                                                (     x    ) Blood and Blood Products     (         )   No Blood or Blood products                                                (     x     )  Dialysis                                    (         )  No Dialysis                                                (          )  Medical Management only  (         )  No Invasive Interventions or Surgery  Time spent :                        (    x   ) upto 30 minutes                       (           )   more than 30 minutes  ACP reviewed and discussed

## 2022-07-18 NOTE — PROGRESS NOTE ADULT - NS ATTEND AMEND GEN_ALL_CORE FT
Chart reviewed    Patient seen and examined    Agree with plan as outlined    71 y/o F with HLD presented to the ED after she was found on the floor today with incidental finding of COVID Pna and new onset of Afib with RVR.       New Onset of Afib/s/p Fall  - No known Hx of Afib.  EKG/tele showed RVR at 120's, likely, in the setting of pulmonary infection/dehydration  - S/p Cardizem 10 mg IV x 2.   - Converted to NSR 7/11 8pm  - Continue Cardizem 30 mg q6H, can be switched to long acting prior to discharge  - [id number] score= 2.  Discussed re: need for AC.  Risk vs benefits discussed.  States, she is not sure if she would take it long-term.  She lives alone and has 17 flights of stairs at home.  For now, would start FD Lovenox and will readdress in the near future. Can be switch to Eliquis prior to DC  - TSH wnl    - No evidence of volume overload  - TTE ordered    - Fall appears to be mechanical, not a true fall.  No syncope
No signs of significant ischemia or volume overload. cont current care. Further cardiac workup will depend on clinical course.
Maintaining nSR.  Continue real agents.  Discussing risk and benefits of long term AC.  To follow while admitted.
doing much better other than persistent cough  initially in af with rvr, now sr based on exam  cont av real blockers and ac
af rvr in the setting of covid  conv to sr last night at 8p, and remains in sr now  cont ccb  supportive care for covid  lovenox -> eliquis

## 2022-07-18 NOTE — PROGRESS NOTE ADULT - PROVIDER SPECIALTY LIST ADULT
Family Medicine
Neurology
Cardiology
Infectious Disease
Cardiology
Hospitalist
Family Medicine

## 2022-07-18 NOTE — PROGRESS NOTE ADULT - ASSESSMENT
71 y/o F with HLD presented to the ED after she was found on the floor today with incidental finding of COVID Pna and new onset of Afib with RVR.     New Onset of Afib/s/p Fall  - No known Hx of Afib.  EKG showed RVR at 120's, likely, in the setting of pulmonary infection/dehydration  - Converted to NSR.  No further Afib on tele. Now off of tele   - Continue Toprol XL 50 mg daily and Cardizem  mg dialy  - Continue Eliquis   - Monitor and replete lytes, keep K>4, Mg>2.  - TSH wnl  - TTE showed normal LV and RV size and systolic function, with EF 60%  - No evidence of volume overload.  Cough likely from Covid.  Continue to encourage Incentive Spirometer.  Remains with +PCR as of 7/16  - No anginal complaints  - BP remains controlled.     - Fall appears to be mechanical, not a true fall.  No syncope    - Will continue to follow.  Otherwise, stable from cardiac standpoint     Keena Gibbs DNP, NP-C  Cardiology   Spectra #[phone]

## 2022-09-13 ENCOUNTER — APPOINTMENT (OUTPATIENT)
Dept: ORTHOPEDIC SURGERY | Facility: CLINIC | Age: 72
End: 2022-09-13

## 2022-09-13 PROCEDURE — 20610 DRAIN/INJ JOINT/BURSA W/O US: CPT | Mod: LT

## 2022-09-14 NOTE — ADDENDUM
[FreeTextEntry1] : This note was written by Devon Escobedo on 06/28/2022, acting as a scribe for MIGUEL WILLARD, BRYON/L, PA

## 2022-09-14 NOTE — PHYSICAL EXAM
[de-identified] : Left Knee: Range of Motion in Degrees	\par 	                  Claimant:	Normal:	\par Flexion Active	  135 	                135-degrees	\par Flexion Passive	  135	                135-degrees	\par Extension Active	  0-5	                0-5-degrees	\par Extension Passive	  0-5	                0-5-degrees	\par \par No weakness to flexion/extension. No evidence of instability in the AP plane or varus or valgus stress.  Negative  Lachman.  Negative pivot shift.  Negative anterior drawer test.  Negative posterior drawer test.  Negative Cathy.  Negative Apley grind.  No medial or lateral joint line tenderness.  Positive tenderness over the medial and lateral facet of the patella.  Positive patellofemoral crepitations.  No lateral tilting patella.  No patella apprehension.  Positive crepitation in the medial and lateral femoral condyle.  No proximal or distal swelling, edema or tenderness.  No gross motor or sensory deficits. Mild intra-articular swelling.  2+ DP and PT pulses. No varus or valgus malalignment.  Skin is intact.  No rashes, scars or lesions. \par

## 2022-09-14 NOTE — PROCEDURE
[de-identified] : Indication: \par Osteoarthritis of left knee \par \par Consent:\par The risks and benefits of the procedure were discussed with the patient in detail.  Upon verbal consent of the patient, we proceeded with the GenVisc-850 injection as noted below.  \par \par Description of Procedure:\par After a sterile prep, the patient underwent a GenVisc-850 injection of 25 mg of Sodium Hyaluronate in a 2.5 mL syringe into the left knee.  The patient tolerated the procedure well.  There were no complications.  \par \par :  [employer]par NDC#: [address]-50\par Lot#:   [id number]\par Expiration Date:  11/30/2024\par \par Plan:\par I have recommended ice and elevation.  The patient will be reassessed in one week for the next GenVisc-850 injection for osteoarthritis of the left knee.\par

## 2022-09-14 NOTE — PHYSICAL EXAM
[de-identified] : Left Knee: Range of Motion in Degrees	\par 	                  Claimant:	Normal:	\par Flexion Active	  135 	                135-degrees	\par Flexion Passive	  135	                135-degrees	\par Extension Active	  0-5	                0-5-degrees	\par Extension Passive	  0-5	                0-5-degrees	\par \par No weakness to flexion/extension. No evidence of instability in the AP plane or varus or valgus stress.  Negative  Lachman.  Negative pivot shift.  Negative anterior drawer test.  Negative posterior drawer test.  Negative Cathy.  Negative Apley grind.  No medial or lateral joint line tenderness.  Positive tenderness over the medial and lateral facet of the patella.  Positive patellofemoral crepitations.  No lateral tilting patella.  No patella apprehension.  Positive crepitation in the medial and lateral femoral condyle.  No proximal or distal swelling, edema or tenderness.  No gross motor or sensory deficits. Mild intra-articular swelling.  2+ DP and PT pulses. No varus or valgus malalignment.  Skin is intact.  No rashes, scars or lesions. \par

## 2022-09-14 NOTE — PROCEDURE
[de-identified] : Indication: \par Osteoarthritis of left knee \par \par Consent:\par The risks and benefits of the procedure were discussed with the patient in detail.  Upon verbal consent of the patient, we proceeded with the GenVisc-850 injection as noted below.  \par \par Description of Procedure:\par After a sterile prep, the patient underwent a GenVisc-850 injection of 25 mg of Sodium Hyaluronate in a 2.5 mL syringe into the left knee.  The patient tolerated the procedure well.  There were no complications.  \par \par :  [employer]par NDC#: [address]-64\par Lot#:   [id number]\par Expiration Date:  11/30/2024\par \par Plan:\par I have recommended ice and elevation.  The patient will be reassessed in one week for the next GenVisc-850 injection for osteoarthritis of the left knee.\par

## 2022-09-14 NOTE — ADDENDUM
[FreeTextEntry1] : This note was written by Devon Escobedo on 09/14/2022, acting as a scribe for MIGUEL WILLARD, BRYON/L, PA

## 2022-09-20 ENCOUNTER — APPOINTMENT (OUTPATIENT)
Dept: ORTHOPEDIC SURGERY | Facility: CLINIC | Age: 72
End: 2022-09-20

## 2022-09-20 PROCEDURE — 20610 DRAIN/INJ JOINT/BURSA W/O US: CPT | Mod: LT

## 2022-09-21 NOTE — ADDENDUM
[FreeTextEntry1] : This note was written by Devon Escobedo on 09/21/2022, acting as a scribe for MIGUEL WILLARD, BRYON/L, PA

## 2022-09-21 NOTE — PROCEDURE
[de-identified] : Indication: \par Osteoarthritis of left knee \par \par Consent:\par The risks and benefits of the procedure were discussed with the patient in detail.  Upon verbal consent of the patient, we proceeded with the GenVisc-850 injection as noted below.  \par \par Description of Procedure:\par After a sterile prep, the patient underwent a GenVisc-850 injection of 25 mg of Sodium Hyaluronate in a 2.5 mL syringe into the left knee.  The patient tolerated the procedure well.  There were no complications.  \par \par :  [employer]par NDC#: [address]-24\par Lot#:   [id number]\par Expiration Date:  11/30/2024\par \par Plan:\par I have recommended ice and elevation.  The patient will be reassessed in one week for the next GenVisc-850 injection for osteoarthritis of the left knee.\par

## 2022-09-21 NOTE — PHYSICAL EXAM
[de-identified] : Left Knee: Range of Motion in Degrees	\par 	                  Claimant:	Normal:	\par Flexion Active	  135 	                135-degrees	\par Flexion Passive	  135	                135-degrees	\par Extension Active	  0-5	                0-5-degrees	\par Extension Passive	  0-5	                0-5-degrees	\par \par No weakness to flexion/extension. No evidence of instability in the AP plane or varus or valgus stress.  Negative  Lachman.  Negative pivot shift.  Negative anterior drawer test.  Negative posterior drawer test.  Negative Cathy.  Negative Apley grind.  No medial or lateral joint line tenderness.  Positive tenderness over the medial and lateral facet of the patella.  Positive patellofemoral crepitations.  No lateral tilting patella.  No patella apprehension.  Positive crepitation in the medial and lateral femoral condyle.  No proximal or distal swelling, edema or tenderness.  No gross motor or sensory deficits. Mild intra-articular swelling.  2+ DP and PT pulses. No varus or valgus malalignment.  Skin is intact.  No rashes, scars or lesions. \par

## 2022-09-30 ENCOUNTER — APPOINTMENT (OUTPATIENT)
Dept: ORTHOPEDIC SURGERY | Facility: CLINIC | Age: 72
End: 2022-09-30

## 2022-09-30 PROCEDURE — 20610 DRAIN/INJ JOINT/BURSA W/O US: CPT | Mod: LT

## 2022-10-04 NOTE — PROCEDURE
[de-identified] : Indication: \par Osteoarthritis of left knee \par \par Consent:\par The risks and benefits of the procedure were discussed with the patient in detail.  Upon verbal consent of the patient, we proceeded with the GenVisc-850 injection as noted below.  \par \par Description of Procedure:\par After a sterile prep, the patient underwent a GenVisc-850 injection of 25 mg of Sodium Hyaluronate in a 2.5 mL syringe into the left knee.  The patient tolerated the procedure well.  There were no complications.  \par \par :  [employer]par NDC#: [address]-12\par Lot#:   [id number]\par Expiration Date:  11/30/2024\par \par Plan:\par I have recommended ice and elevation.  The patient will be reassessed in 6-8 weeks for osteoarthritis of the left knee.\par \par

## 2022-10-04 NOTE — PHYSICAL EXAM
[de-identified] : Left Knee: Range of Motion in Degrees	\par 	                  Claimant:	Normal:	\par Flexion Active	  135 	                135-degrees	\par Flexion Passive	  135	                135-degrees	\par Extension Active	  0-5	                0-5-degrees	\par Extension Passive	  0-5	                0-5-degrees	\par \par No weakness to flexion/extension. No evidence of instability in the AP plane or varus or valgus stress.  Negative  Lachman.  Negative pivot shift.  Negative anterior drawer test.  Negative posterior drawer test.  Negative Cathy.  Negative Apley grind.  No medial or lateral joint line tenderness.  Positive tenderness over the medial and lateral facet of the patella.  Positive patellofemoral crepitations.  No lateral tilting patella.  No patella apprehension.  Positive crepitation in the medial and lateral femoral condyle.  No proximal or distal swelling, edema or tenderness.  No gross motor or sensory deficits. Mild intra-articular swelling.  2+ DP and PT pulses. No varus or valgus malalignment.  Skin is intact.  No rashes, scars or lesions. \par

## 2022-10-04 NOTE — ADDENDUM
[FreeTextEntry1] : This note was written by Devon Escobedo on 10/04/2022, acting as a scribe for MIGUEL WILLARD, BRYON/L, PA

## 2023-01-31 NOTE — PHYSICAL THERAPY INITIAL EVALUATION ADULT - LEVEL OF INDEPENDENCE: STAIR NEGOTIATION, REHAB EVAL
did not attempt Tremfya Pregnancy And Lactation Text: The risk during pregnancy and breastfeeding is uncertain with this medication.

## 2023-03-31 ENCOUNTER — APPOINTMENT (OUTPATIENT)
Dept: ORTHOPEDIC SURGERY | Facility: CLINIC | Age: 73
End: 2023-03-31
Payer: MEDICARE

## 2023-03-31 VITALS
WEIGHT: 175 LBS | HEART RATE: 79 BPM | BODY MASS INDEX: 29.88 KG/M2 | SYSTOLIC BLOOD PRESSURE: 130 MMHG | HEIGHT: 64 IN | DIASTOLIC BLOOD PRESSURE: 81 MMHG

## 2023-03-31 DIAGNOSIS — M17.11 UNILATERAL PRIMARY OSTEOARTHRITIS, RIGHT KNEE: ICD-10-CM

## 2023-03-31 PROCEDURE — 20610 DRAIN/INJ JOINT/BURSA W/O US: CPT | Mod: LT

## 2023-03-31 PROCEDURE — 99213 OFFICE O/P EST LOW 20 MIN: CPT | Mod: 25

## 2023-03-31 PROCEDURE — 73560 X-RAY EXAM OF KNEE 1 OR 2: CPT | Mod: 50

## 2023-03-31 RX ORDER — HYALURONATE SODIUM 10 MG/ML
25 SYRINGE (ML) INTRAARTICULAR
Qty: 5 | Refills: 0 | Status: ACTIVE | OUTPATIENT
Start: 2023-03-31

## 2023-04-04 VITALS
SYSTOLIC BLOOD PRESSURE: 130 MMHG | DIASTOLIC BLOOD PRESSURE: 81 MMHG | HEART RATE: 79 BPM | WEIGHT: 175 LBS | HEIGHT: 64 IN | BODY MASS INDEX: 29.88 KG/M2

## 2023-04-04 PROBLEM — M17.11 PRIMARY OSTEOARTHRITIS OF RIGHT KNEE: Status: ACTIVE | Noted: 2023-03-31

## 2023-04-04 NOTE — DISCUSSION/SUMMARY
[de-identified] : The patient presents with osteoarthritis of the bilateral knees.  The patient was given a GenVisc 850 injection to her left knee.  I have recommended ice and elevation.  The patient will be reassessed in one week for the next GenVisc 850 injection for the left knee osteoarthritis.   \par  \par

## 2023-04-04 NOTE — ADDENDUM
[FreeTextEntry1] : This note was written by Magdalene Duarte on 04/04/2023 acting as scribe for Ellie Escudero, OTR/L, PA

## 2023-04-04 NOTE — REASON FOR VISIT
[Follow-Up Visit] : a follow-up visit for [FreeTextEntry2] : her bilateral knees and a procedure visit for the first GenVisc 850 injection to her left knee.

## 2023-04-04 NOTE — HISTORY OF PRESENT ILLNESS
[de-identified] : The patient comes in today for her six month follow-up to start her gel injections.  We started her on a GenVisc 850 injection to the left knee.

## 2023-04-04 NOTE — PHYSICAL EXAM
[de-identified] : Right Knee:\par Knee: Range of Motion in Degrees	\par 	                  Claimant:	Normal:	\par Flexion Active	  135 	                135-degrees	\par Flexion Passive	  135	                135-degrees	\par Extension Active	  0-5	                0-5-degrees	\par Extension Passive	  0-5	                0-5-degrees	\par \par No weakness to flexion/extension.  No evidence of instability in the AP plane or varus or valgus stress.  Negative  Lachman.  Negative pivot shift.  Negative anterior drawer test.  Negative posterior drawer test.  Negative Cathy.  Negative Apley grind.  No medial or lateral joint line tenderness.  Positive tenderness over the medial and lateral facet of the patella.  Positive patellofemoral crepitations.  No lateral tilting patella.  No patella apprehension.  Positive crepitation in the medial and lateral femoral condyle.  No proximal or distal swelling, edema or tenderness.  No gross motor or sensory deficits.  Mild intra-articular swelling.  2+ DP and PT pulses.  No varus or valgus malalignment.  Skin is intact.  No rashes, scars or lesions.  \par  \par Left Knee: \par Knee:  Range of Motion in Degrees	\par 	  Claimant:	Normal:	\par Flexion Active	 135 	 135-degrees	\par Flexion Passive	 135	 135-degrees	\par Extension Active	 0-5	 0-5-degrees	\par Extension Passive	 0-5	 0-5-degrees	\par \par No weakness to flexion/extension. No evidence of instability in the AP plane or varus or valgus stress. Negative Lachman. Negative pivot shift. Negative anterior drawer test. Negative posterior drawer test. Negative Cathy. Negative Apley grind. No medial or lateral joint line tenderness. Positive tenderness over the medial and lateral facet of the patella. Positive patellofemoral crepitations. No lateral tilting patella. No patella apprehension. Positive crepitation in the medial and lateral femoral condyle. No proximal or distal swelling, edema or tenderness. No gross motor or sensory deficits. Mild intra-articular swelling. 2+ DP and PT pulses. No varus or valgus malalignment. Skin is intact. No rashes, scars or lesions. \par  [de-identified] : Gait and Station:  Ambulating with a slightly antalgic to antalgic gait.  Station:  Normal.  [de-identified] : Appearance:  Well-developed, well-nourished female in no acute distress.\par   [de-identified] : Radiographs, one to two views of the bilateral knees with AP standing taken in the office today, reveal moderate arthritis.

## 2023-04-04 NOTE — PROCEDURE
[de-identified] : Indication:\par Osteoarthritis of the left knee\par \par Consent:\par The risks and benefits of the procedure were discussed with the patient in detail.  Upon verbal consent of the patient, we proceeded with the GenVisc 850 injection as noted below.  \par \par Description of Procedure:\par After a sterile prep, the patient underwent a GenVisc 850 injection of 25 mg of Sodium Hyaluronate in a 2.5 mL syringe into the left knee.  The patient tolerated the procedure well.  There were no complications.  \par \par :  [employer]par NDC#:  [address]-04\par Lot#:  [id number] Exp Date:  11/30/2024

## 2023-04-14 ENCOUNTER — APPOINTMENT (OUTPATIENT)
Dept: ORTHOPEDIC SURGERY | Facility: CLINIC | Age: 73
End: 2023-04-14
Payer: MEDICARE

## 2023-04-14 PROCEDURE — 20610 DRAIN/INJ JOINT/BURSA W/O US: CPT | Mod: LT

## 2023-04-17 NOTE — ED PROVIDER NOTE - WR ORDER STATUS 1
Performed Patient is a 51yo  male, , domiciled with wife and sons, employed at a dry cleaner, with pph of KOJO, OCD, at least one prior short psych admission, no current outpt care, no past suicide attempts, denies substance use, and no reported pmh. He brings self in for worsening anxiety for the last 3 weeks.     On exam, patient is very anxious, at times tearful. He reports he was diagnosed with anxiety and OCD 20 years ago and used to see a [place] speaking therapist and psychiatrist. In time, his treatment providers changed, patient moved, or was unable to open up to them so pt was lost to follow up and stopped taking his medications. Three weeks ago, patient started having worsening anxiety so went to his PMD who prescribed him zoloft. These current episodes are characterized by morning anxiety, headache and pressure, pain in his limbs, and Resulted Patient is a 53yo  male, , domiciled with wife and sons, employed at a , with pph of KOJO, OCD, at least one prior short psych admission, no current outpt care, no past suicide attempts, denies substance use, and no reported pmh. He brings self in for worsening anxiety for the last 3 weeks.     On exam, patient is very anxious, at times tearful. He reports he was diagnosed with anxiety and OCD 20 years ago and used to see a [place] speaking therapist and psychiatrist in the past. In time, his treatment providers changed, patient moved, or was unable to open up to them so pt was lost to follow up and stopped taking his medications. Three weeks ago, patient started having worsening anxiety so went to his PMD who prescribed him zoloft. He continues to have severe anxiety in addition to morning waking anxiety, headache and pressure in his head, pain and burning sensation in his limbs, illogical thinking, overthinking, and panic attacks. The symptoms have become unbearable and he requested his PMD raise his dose and prescribe an anxiolytic, however his doctor would not and instructed him to find a psychiatrist. Patient admits he has been using his wife's old rx of Xanax 0.5mg, breaking them in half and taking them which help relieve his anxiety. But then, he looked up the medications and was alarmed to learn these meds have a high abuse potential. He also became concerned that his physical symptoms were indicative of a more serious illness. Pt endorses depressed mood, anhedonia, intrusive thoughts, several days of crying and fear about being addicted. He denies any SI/P/I, no prior SAs. He explains he want to live and get better, wants to live at least 20 years. Pt repeatedly asks for urgent outpatient follow up and process, would like to be seen at [facility]. Denies HI, AVH, paranoia, delusions, no s/sx of hailey or psychosis elicited.     see  note for collateral info from spouse

## 2023-04-18 NOTE — PROCEDURE
[de-identified] : Indication:\par Osteoarthritis of the left knee\par \par Consent:\par The risks and benefits of the procedure were discussed with the patient in detail. Upon verbal consent of the patient, we proceeded with the GenVisc 850 injection as noted below. \par \par Description of Procedure:\par After a sterile prep, the patient underwent a GenVisc 850 injection of 25 mg of Sodium Hyaluronate in a 2.5 mL syringe into the left knee. The patient tolerated the procedure well. There were no complications. \par \par : [employer]par NDC #: [address]-34\par Lot #: [id number] Expiration: 11/30/2024\par \par \par Plan:\par I have recommended ice and elevation.  The patient will be reassessed in one week for the next Supartz injection for the left knee osteoarthritis.

## 2023-04-18 NOTE — PHYSICAL EXAM
[de-identified] : Left Knee: \par Range of Motion in Degrees	\par 	 Claimant:	Normal:	\par Flexion Active	 135 	 135-degrees	\par Flexion Passive	 135	 135-degrees	\par Extension Active	 0-5	 0-5-degrees	\par Extension Passive	 0-5	 0-5-degrees	\par \par No weakness to flexion/extension. No evidence of instability in the AP plane or varus or valgus stress. Negative Lachman. Negative pivot shift. Negative anterior drawer test. Negative posterior drawer test. Negative Cathy. Negative Apley grind. No medial or lateral joint line tenderness. Positive tenderness over the medial and lateral facet of the patella. Positive patellofemoral crepitations. No lateral tilting patella. No patella apprehension. Positive crepitation in the medial and lateral femoral condyle. No proximal or distal swelling, edema or tenderness. No gross motor or sensory deficits. Mild intra-articular swelling. 2+ DP and PT pulses. No varus or valgus malalignment. Skin is intact. No rashes, scars or lesions.

## 2023-04-18 NOTE — ADDENDUM
[FreeTextEntry1] : This note was written by Jie Pace on 04/18/2023 acting as scribe for Ellie Escudero, OTR/L, PA

## 2023-04-21 ENCOUNTER — APPOINTMENT (OUTPATIENT)
Dept: ORTHOPEDIC SURGERY | Facility: CLINIC | Age: 73
End: 2023-04-21
Payer: MEDICARE

## 2023-04-21 PROCEDURE — 20610 DRAIN/INJ JOINT/BURSA W/O US: CPT | Mod: LT

## 2023-04-24 NOTE — PHYSICAL EXAM
[de-identified] : Left Knee: Range of Motion in Degrees	\par 	                  Claimant:	Normal:	\par Flexion Active	  135 	                135-degrees	\par Flexion Passive	  135	                135-degrees	\par Extension Active	  0-5	                0-5-degrees	\par Extension Passive	  0-5	                0-5-degrees	\par \par No weakness to flexion/extension. No evidence of instability in the AP plane or varus or valgus stress.  Negative  Lachman.  Negative pivot shift.  Negative anterior drawer test.  Negative posterior drawer test.  Negative Cathy.  Negative Apley grind.  No medial or lateral joint line tenderness.  Positive tenderness over the medial and lateral facet of the patella.  Positive patellofemoral crepitations.  No lateral tilting patella.  No patella apprehension.  Positive crepitation in the medial and lateral femoral condyle.  No proximal or distal swelling, edema or tenderness.  No gross motor or sensory deficits. Mild intra-articular swelling.  2+ DP and PT pulses. No varus or valgus malalignment.  Skin is intact.  No rashes, scars or lesions. \par

## 2023-04-24 NOTE — PROCEDURE
[de-identified] : Indication: \par Osteoarthritis of left knee \par \par Consent:\par The risks and benefits of the procedure were discussed with the patient in detail.  Upon verbal consent of the patient, we proceeded with the GenVisc-850 injection as noted below.  \par \par Description of Procedure:\par After a sterile prep, the patient underwent a GenVisc-850 injection of 25 mg of Sodium Hyaluronate in a 2.5 mL syringe into the left knee.  The patient tolerated the procedure well.  There were no complications.  \par \par :  [employer]par NDC#: [address]-00\par Lot#:   [id number] Expiration Date:  11/30/2024\par \par Plan:\par I have recommended ice and elevation.  The patient will be reassessed in one week for the next GenVisc-850 injection for osteoarthritis of the left knee.\par

## 2023-04-24 NOTE — ADDENDUM
[FreeTextEntry1] : This note was written by Devon Escobedo on 04/24/2023, acting as a scribe for MIGUEL WILLARD, BRYON/L, PA

## 2023-04-28 ENCOUNTER — APPOINTMENT (OUTPATIENT)
Dept: ORTHOPEDIC SURGERY | Facility: CLINIC | Age: 73
End: 2023-04-28

## 2023-05-19 ENCOUNTER — APPOINTMENT (OUTPATIENT)
Dept: ORTHOPEDIC SURGERY | Facility: CLINIC | Age: 73
End: 2023-05-19
Payer: MEDICARE

## 2023-05-19 PROCEDURE — 20610 DRAIN/INJ JOINT/BURSA W/O US: CPT | Mod: LT

## 2023-05-23 NOTE — PROCEDURE
[de-identified] : Indication: \par Osteoarthritis of left knee \par \par Consent:\par The risks and benefits of the procedure were discussed with the patient in detail.  Upon verbal consent of the patient, we proceeded with the GenVisc 850 injection as noted below.  \par \par Description of Procedure:\par After a sterile prep, the patient underwent a GenVisc 850 injection of 25 mg of Sodium Hyaluronate in a 2.5 mL syringe into the left knee.  The patient tolerated the procedure well.  There were no complications.  \par \par :  [employer]par NDC#: [address]-47\par Lot#:   [id number] Expiration Date:  11/30/2024\par \par Plan:\par I have recommended ice and elevation.  The patient will be reassessed in one week for the next GenVisc 850 injection for osteoarthritis of the left knee.\par

## 2023-05-23 NOTE — PHYSICAL EXAM
[de-identified] : Left Knee: \par Knee:  Range of Motion in Degrees	\par 	                  Claimant:	Normal:	\par Flexion Active	  135 	                135-degrees	\par Flexion Passive	  135	                135-degrees	\par Extension Active	  0-5	                0-5-degrees	\par Extension Passive	  0-5	                0-5-degrees	\par \par No weakness to flexion/extension. No evidence of instability in the AP plane or varus or valgus stress.  Negative  Lachman.  Negative pivot shift.  Negative anterior drawer test.  Negative posterior drawer test.  Negative Cathy.  Negative Apley grind.  No medial or lateral joint line tenderness.  Positive tenderness over the medial and lateral facet of the patella.  Positive patellofemoral crepitations.  No lateral tilting patella.  No patella apprehension.  Positive crepitation in the medial and lateral femoral condyle.  No proximal or distal swelling, edema or tenderness.  No gross motor or sensory deficits. Mild intra-articular swelling.  2+ DP and PT pulses. No varus or valgus malalignment.  Skin is intact.  No rashes, scars or lesions.

## 2023-05-23 NOTE — ADDENDUM
[FreeTextEntry1] : This note was written by Magdalene Duarte on 05/23/2023 acting as scribe for Ellie Escudero, OTR/L, PA

## 2023-05-26 ENCOUNTER — APPOINTMENT (OUTPATIENT)
Dept: ORTHOPEDIC SURGERY | Facility: CLINIC | Age: 73
End: 2023-05-26
Payer: MEDICARE

## 2023-05-26 PROCEDURE — 20610 DRAIN/INJ JOINT/BURSA W/O US: CPT | Mod: LT

## 2023-05-30 NOTE — ADDENDUM
[FreeTextEntry1] : This note was written by Devon Escobedo on 05/30/2023, acting as a scribe for MIGUEL WILLARD, BRYON/L, PA

## 2023-05-30 NOTE — PROCEDURE
[de-identified] : Indication: \par Osteoarthritis of left knee \par \par Consent:\par The risks and benefits of the procedure were discussed with the patient in detail.  Upon verbal consent of the patient, we proceeded with the GenVisc-850 injection as noted below.  \par \par Description of Procedure:\par After a sterile prep, the patient underwent a GenVisc-850 injection of 25 mg of Sodium Hyaluronate in a 2.5 mL syringe into the left knee.  The patient tolerated the procedure well.  There were no complications.  \par \par :  [employer]par NDC#: [address]-33\par Lot#:   [id number] Expiration Date:  11/30/2024\par \par Plan:\par I have recommended ice and elevation.  The patient will be reassessed in 6-8 weeks for osteoarthritis of the left knee.\par \par

## 2023-10-31 NOTE — ED PROVIDER NOTE - NS ED MD DISPO ISOLATION TYPES
**It is YOUR responsibilty to bring medication bottles and/or updated medication list to [address] [place] [address]. This will allow us to better serve you and all your healthcare needs**   [facility] Locations - No appointment necessary. Sites open Monday to Friday. Call your preferred location for test preparation, business   hours and other information you need. [employer] accepts 's. [address] [place] [address]. [address] [place], [address] [facility]  Phone: [phone]    Thank you for allowing us to care for you today! We want to ensure we can follow your treatment plan and we strive to give you the best outcomes and experience possible. If you ever have a life threatening emergency and call 911 - for an ambulance (EMS)   Our providers can only care for you at:   [facility] or [facility]. Even if you have someone take you or you drive yourself we can only care for you in a [address] [place] [address] facility. Our providers are not setup at the other healthcare locations! Please be informed that if you contact our office outside of normal business hours the physician on call cannot help with any scheduling or rescheduling issues, procedure instruction questions or any type of medication issue. We advise you for any urgent/emergency that you go to the nearest emergency room! PLEASE CALL OUR OFFICE DURING NORMAL BUSINESS HOURS    Monday - Friday   8 am to 5 pm    [place]: [address]: [phone]    [place]:  [phone]  We are committed to providing you the best care possible. If you receive a survey after visiting one of our offices, please take time to share your experience concerning your physician office visit. These surveys are confidential and no health information about you is shared. We are eager to improve for you and we are counting on your feedback to help make that happen.
[facility]/Contact

## 2023-11-28 ENCOUNTER — APPOINTMENT (OUTPATIENT)
Dept: ORTHOPEDIC SURGERY | Facility: CLINIC | Age: 73
End: 2023-11-28
Payer: MEDICARE

## 2023-11-28 VITALS
BODY MASS INDEX: 29.88 KG/M2 | HEIGHT: 64 IN | TEMPERATURE: 98.1 F | SYSTOLIC BLOOD PRESSURE: 89 MMHG | DIASTOLIC BLOOD PRESSURE: 56 MMHG | WEIGHT: 175 LBS | HEART RATE: 75 BPM

## 2023-11-28 PROCEDURE — 73560 X-RAY EXAM OF KNEE 1 OR 2: CPT | Mod: LT

## 2023-11-28 PROCEDURE — 20610 DRAIN/INJ JOINT/BURSA W/O US: CPT | Mod: LT

## 2023-12-05 ENCOUNTER — APPOINTMENT (OUTPATIENT)
Dept: ORTHOPEDIC SURGERY | Facility: CLINIC | Age: 73
End: 2023-12-05
Payer: MEDICARE

## 2023-12-05 PROCEDURE — 20610 DRAIN/INJ JOINT/BURSA W/O US: CPT | Mod: LT

## 2023-12-12 ENCOUNTER — APPOINTMENT (OUTPATIENT)
Dept: ORTHOPEDIC SURGERY | Facility: CLINIC | Age: 73
End: 2023-12-12
Payer: MEDICARE

## 2023-12-12 DIAGNOSIS — M19.072 PRIMARY OSTEOARTHRITIS, LEFT ANKLE AND FOOT: ICD-10-CM

## 2023-12-12 PROCEDURE — 20610 DRAIN/INJ JOINT/BURSA W/O US: CPT | Mod: LT

## 2023-12-13 NOTE — PROCEDURE
[de-identified] : Indications: Osteoarthritis of the left knee  Consent: The risks and benefits of the procedure were discussed with the patient in detail. Upon verbal consent of the patient, we proceeded with the SynoJoynt injection as noted below.  Description of Procedure: After sterile prep, the patient underwent a SynoJoynt injection of 20 mg/2 mL of sodium hyaluronate in a 3 mL syringe into the left knee. The patient tolerated the procedure well. There were no complications.  : [employer] NDC#: [address]-00 Lot#: [id number] Expiration Date: 01/09/2025  Plan: I have recommended ice and elevation. The patient will be reassessed in six to eight weeks for the left knee osteoarthritis.

## 2023-12-13 NOTE — ADDENDUM
[FreeTextEntry1] : This note was written by Jie Pace on 12/13/2023 acting as scribe for Ellie Escudero, OTR/L, PA

## 2024-06-25 ENCOUNTER — APPOINTMENT (OUTPATIENT)
Dept: ORTHOPEDIC SURGERY | Facility: CLINIC | Age: 74
End: 2024-06-25

## 2024-06-25 VITALS
DIASTOLIC BLOOD PRESSURE: 85 MMHG | BODY MASS INDEX: 29.88 KG/M2 | HEART RATE: 74 BPM | WEIGHT: 175 LBS | SYSTOLIC BLOOD PRESSURE: 161 MMHG | HEIGHT: 64 IN

## 2024-06-25 DIAGNOSIS — M17.12 UNILATERAL PRIMARY OSTEOARTHRITIS, LEFT KNEE: ICD-10-CM

## 2024-06-25 PROCEDURE — 99213 OFFICE O/P EST LOW 20 MIN: CPT

## 2024-06-25 PROCEDURE — 73560 X-RAY EXAM OF KNEE 1 OR 2: CPT | Mod: LT

## 2024-06-25 RX ORDER — HYALURONATE SODIUM 10 MG/ML
20 SYRINGE (ML) INTRAARTICULAR
Qty: 3 | Refills: 0 | Status: ACTIVE | OUTPATIENT
Start: 2024-06-25

## 2024-07-19 ENCOUNTER — APPOINTMENT (OUTPATIENT)
Dept: ORTHOPEDIC SURGERY | Facility: CLINIC | Age: 74
End: 2024-07-19

## 2024-07-19 PROCEDURE — 20610 DRAIN/INJ JOINT/BURSA W/O US: CPT | Mod: LT

## 2024-07-24 NOTE — PATIENT PROFILE ADULT - LEGAL HELP
[facility]- [facility] Therapy [address], [place] [address] office: [phone] fax: [phone]     Physical Therapy Initial Evaluation Certification      Dear Kian Vazquez MD,    We had the pleasure of evaluating the following patient for physical therapy services at [facility].  A summary of our findings can be found in the initial assessment below.  This includes our plan of care.  If you have any questions or concerns regarding these findings, please do not hesitate to contact me at the office phone number listed above.  Thank you for the referral.     Physician Signature:_______________________________Date:__________________  By signing above (or electronic signature), therapist’s plan is approved by physician       Physical Therapy: TREATMENT/PROGRESS NOTE   Patient: Shirley Guidry (27 y.o. female)   Examination Date: 2024   :  1997 MRN: [id number]   Visit #: 1   Insurance Allowable Auth Needed   60 [x]Yes    []No    Insurance: Payor: AMBETTER / Plan: AMBETTER / Product Type: *No Product type* /   Insurance ID: [id number] - (Commercial)  Secondary Insurance (if applicable):    Treatment Diagnosis:     ICD-10-CM    1. Left medial knee pain  M25.562          Medical Diagnosis:  Patellofemoral pain syndrome of left knee [M22.2X2]   Referring Physician: Kian Vazquez MD  PCP: None, None     Plan of care signed (Y/N):     Date of Patient follow up with Physician:      Progress Report/POC: EVAL today  POC update due: (10 visits /OR AUTH LIMITS, whichever is less)  2024                                             Medical History:  Comorbidities:  None  Relevant Medical History: hx of chondroplasty, plica excision and lateral release in                                          Precautions/ Contra-indications:           Latex allergy:  NO  Pacemaker:    NO  Contraindications for Manipulation: None  Date of  no

## 2024-07-26 ENCOUNTER — APPOINTMENT (OUTPATIENT)
Dept: ORTHOPEDIC SURGERY | Facility: CLINIC | Age: 74
End: 2024-07-26

## 2024-07-26 PROCEDURE — 20610 DRAIN/INJ JOINT/BURSA W/O US: CPT | Mod: LT

## 2024-07-30 NOTE — PHYSICAL EXAM
[de-identified] : Left Knee: Range of Motion in Degrees      Claimant:  Normal: Flexion Active:   135  135-degrees Flexion Passive:   135  135-degrees Extension Active:   0-5   0-5-degrees Extension Passive:   0-5   0-5-degrees  No weakness to flexion/extension. No evidence of instability in the AP plane or varus or valgus stress. Negative Lachman. Negative pivot shift. Negative anterior drawer test. Negative posterior drawer test. Negative Cathy. Negative Apley grind. No medial or lateral joint line tenderness. Positive tenderness over the medial and lateral facet of the patella. Positive patellofemoral crepitations. No lateral tilting patella. No patella apprehension. Positive crepitation in the medial and lateral femoral condyle. No proximal or distal swelling, edema or tenderness. No gross motor or sensory deficits. Mild intra-articular swelling. 2+ DP and PT pulses. No varus or valgus malalignment. Skin is intact. No rashes, scars or lesions.

## 2024-07-30 NOTE — PROCEDURE
[de-identified] : Indications:  Osteoarthritis of the left knee  Consent: The risks and benefits of the procedure were discussed with the patient in detail.  Upon verbal consent of the patient, we proceeded with the SynoJoynt injection as noted below.    Description of Procedure: After sterile prep, the patient underwent a SynoJoynt injection of 20 mg/2 mL of sodium hyaluronate in a 3 mL syringe into the left knee.  The patient tolerated the procedure well.  There were no complications.    :  [employer] NDC#:  [address]-11 Lot#:     [id number]    Expiration Date:   03/19/2025  Plan: I have recommended ice and elevation.  The patient will be reassessed in one week for the next SynoJoynt injection for the left knee osteoarthritis.

## 2024-07-30 NOTE — ADDENDUM
[FreeTextEntry1] : This note was written by Devon Escobedo on 07/30/2024, acting as a scribe for MIGUEL WILLARD, BRYON/L, PA

## 2024-08-02 ENCOUNTER — APPOINTMENT (OUTPATIENT)
Dept: ORTHOPEDIC SURGERY | Facility: CLINIC | Age: 74
End: 2024-08-02

## 2024-08-02 DIAGNOSIS — M17.12 UNILATERAL PRIMARY OSTEOARTHRITIS, LEFT KNEE: ICD-10-CM

## 2024-08-02 PROCEDURE — 20610 DRAIN/INJ JOINT/BURSA W/O US: CPT | Mod: LT

## 2024-08-06 NOTE — PHYSICAL EXAM
[de-identified] : Left Knee: Range of Motion in Degrees  Claimant: Normal: Flexion Active: 135 135-degrees Flexion Passive: 135 135-degrees Extension Active: 0-5 0-5-degrees Extension Passive: 0-5 0-5-degrees  No weakness to flexion/extension. No evidence of instability in the AP plane or varus or valgus stress. Negative Lachman. Negative pivot shift. Negative anterior drawer test. Negative posterior drawer test. Negative Cathy. Negative Apley grind. No medial or lateral joint line tenderness. Positive tenderness over the medial and lateral facet of the patella. Positive patellofemoral crepitations. No lateral tilting patella. No patella apprehension. Positive crepitation in the medial and lateral femoral condyle. No proximal or distal swelling, edema or tenderness. No gross motor or sensory deficits. Mild intra-articular swelling. 2+ DP and PT pulses. No varus or valgus malalignment. Skin is intact. No rashes, scars or lesions.

## 2024-08-06 NOTE — ADDENDUM
[FreeTextEntry1] : This note was written by Jie Pace on 08/06/2024 acting as scribe for Ellie Escudero, OTR/L, PA

## 2024-08-06 NOTE — PROCEDURE
[de-identified] : Indications: Osteoarthritis of the left knee  Consent: The risks and benefits of the procedure were discussed with the patient in detail. Upon verbal consent of the patient, we proceeded with the SynoJoynt injection as noted below.  Description of Procedure: After sterile prep, the patient underwent a SynoJoynt injection of 20 mg/2 mL of sodium hyaluronate in a 3 mL syringe into the left knee. The patient tolerated the procedure well. There were no complications.  : [employer] NDC#: [address]-00 Lot#: [id number] Expiration Date: 03/19/2025  Plan: I have recommended ice and elevation. The patient will be reassessed in six to eight weeks for the left knee osteoarthritis.

## 2024-12-26 ENCOUNTER — RESULT REVIEW (OUTPATIENT)
Age: 74
End: 2024-12-26

## 2024-12-26 ENCOUNTER — TRANSCRIPTION ENCOUNTER (OUTPATIENT)
Age: 74
End: 2024-12-26

## 2024-12-26 ENCOUNTER — INPATIENT (INPATIENT)
Facility: HOSPITAL | Age: 74
LOS: 3 days | Discharge: HOME | DRG: 312 | End: 2024-12-30
Attending: STUDENT IN AN ORGANIZED HEALTH CARE EDUCATION/TRAINING PROGRAM | Admitting: STUDENT IN AN ORGANIZED HEALTH CARE EDUCATION/TRAINING PROGRAM
Payer: MEDICARE

## 2024-12-26 VITALS
TEMPERATURE: 98 F | HEART RATE: 78 BPM | SYSTOLIC BLOOD PRESSURE: 113 MMHG | DIASTOLIC BLOOD PRESSURE: 52 MMHG | OXYGEN SATURATION: 95 % | RESPIRATION RATE: 18 BRPM

## 2024-12-26 DIAGNOSIS — R55 SYNCOPE AND COLLAPSE: ICD-10-CM

## 2024-12-26 LAB
ALBUMIN SERPL ELPH-MCNC: 3.9 G/DL — SIGNIFICANT CHANGE UP (ref 3.3–5)
ALP SERPL-CCNC: 82 U/L — SIGNIFICANT CHANGE UP (ref 40–120)
ALT FLD-CCNC: 22 U/L — SIGNIFICANT CHANGE UP (ref 12–78)
ANION GAP SERPL CALC-SCNC: 7 MMOL/L — SIGNIFICANT CHANGE UP (ref 5–17)
APTT BLD: 28.7 SEC — SIGNIFICANT CHANGE UP (ref 24.5–35.6)
AST SERPL-CCNC: 16 U/L — SIGNIFICANT CHANGE UP (ref 15–37)
BASOPHILS # BLD AUTO: 0.04 K/UL — SIGNIFICANT CHANGE UP (ref 0–0.2)
BASOPHILS NFR BLD AUTO: 0.5 % — SIGNIFICANT CHANGE UP (ref 0–2)
BILIRUB SERPL-MCNC: 1.5 MG/DL — HIGH (ref 0.2–1.2)
BUN SERPL-MCNC: 12 MG/DL — SIGNIFICANT CHANGE UP (ref 7–23)
CALCIUM SERPL-MCNC: 9.5 MG/DL — SIGNIFICANT CHANGE UP (ref 8.5–10.1)
CHLORIDE SERPL-SCNC: 102 MMOL/L — SIGNIFICANT CHANGE UP (ref 96–108)
CK SERPL-CCNC: 55 U/L — SIGNIFICANT CHANGE UP (ref 26–192)
CO2 SERPL-SCNC: 23 MMOL/L — SIGNIFICANT CHANGE UP (ref 22–31)
CREAT SERPL-MCNC: 1.28 MG/DL — SIGNIFICANT CHANGE UP (ref 0.5–1.3)
EGFR: 43 ML/MIN/1.73M2 — LOW
EOSINOPHIL # BLD AUTO: 0.02 K/UL — SIGNIFICANT CHANGE UP (ref 0–0.5)
EOSINOPHIL NFR BLD AUTO: 0.3 % — SIGNIFICANT CHANGE UP (ref 0–6)
GLUCOSE SERPL-MCNC: 132 MG/DL — HIGH (ref 70–99)
HCT VFR BLD CALC: 38.5 % — SIGNIFICANT CHANGE UP (ref 34.5–45)
HGB BLD-MCNC: 13.1 G/DL — SIGNIFICANT CHANGE UP (ref 11.5–15.5)
IMM GRANULOCYTES NFR BLD AUTO: 0.4 % — SIGNIFICANT CHANGE UP (ref 0–0.9)
INR BLD: 1.01 RATIO — SIGNIFICANT CHANGE UP (ref 0.85–1.16)
LYMPHOCYTES # BLD AUTO: 1.07 K/UL — SIGNIFICANT CHANGE UP (ref 1–3.3)
LYMPHOCYTES # BLD AUTO: 14.6 % — SIGNIFICANT CHANGE UP (ref 13–44)
MAGNESIUM SERPL-MCNC: 1.7 MG/DL — SIGNIFICANT CHANGE UP (ref 1.6–2.6)
MCHC RBC-ENTMCNC: 29.8 PG — SIGNIFICANT CHANGE UP (ref 27–34)
MCHC RBC-ENTMCNC: 34 G/DL — SIGNIFICANT CHANGE UP (ref 32–36)
MCV RBC AUTO: 87.5 FL — SIGNIFICANT CHANGE UP (ref 80–100)
MONOCYTES # BLD AUTO: 1.08 K/UL — HIGH (ref 0–0.9)
MONOCYTES NFR BLD AUTO: 14.7 % — HIGH (ref 2–14)
NEUTROPHILS # BLD AUTO: 5.09 K/UL — SIGNIFICANT CHANGE UP (ref 1.8–7.4)
NEUTROPHILS NFR BLD AUTO: 69.5 % — SIGNIFICANT CHANGE UP (ref 43–77)
NT-PROBNP SERPL-SCNC: 1027 PG/ML — HIGH (ref 0–450)
PLATELET # BLD AUTO: 247 K/UL — SIGNIFICANT CHANGE UP (ref 150–400)
POTASSIUM SERPL-MCNC: 4 MMOL/L — SIGNIFICANT CHANGE UP (ref 3.5–5.3)
POTASSIUM SERPL-SCNC: 4 MMOL/L — SIGNIFICANT CHANGE UP (ref 3.5–5.3)
PROT SERPL-MCNC: 7.6 GM/DL — SIGNIFICANT CHANGE UP (ref 6–8.3)
PROTHROM AB SERPL-ACNC: 11.9 SEC — SIGNIFICANT CHANGE UP (ref 9.9–13.4)
RBC # BLD: 4.4 M/UL — SIGNIFICANT CHANGE UP (ref 3.8–5.2)
RBC # FLD: 13.3 % — SIGNIFICANT CHANGE UP (ref 10.3–14.5)
SODIUM SERPL-SCNC: 132 MMOL/L — LOW (ref 135–145)
TROPONIN I, HIGH SENSITIVITY RESULT: 127.06 NG/L — HIGH
TROPONIN I, HIGH SENSITIVITY RESULT: 25.79 NG/L — SIGNIFICANT CHANGE UP
TROPONIN I, HIGH SENSITIVITY RESULT: 78.64 NG/L — HIGH
TSH SERPL-MCNC: 1.24 UU/ML — SIGNIFICANT CHANGE UP (ref 0.34–4.82)
WBC # BLD: 7.33 K/UL — SIGNIFICANT CHANGE UP (ref 3.8–10.5)
WBC # FLD AUTO: 7.33 K/UL — SIGNIFICANT CHANGE UP (ref 3.8–10.5)

## 2024-12-26 PROCEDURE — 97116 GAIT TRAINING THERAPY: CPT | Mod: GP

## 2024-12-26 PROCEDURE — 84484 ASSAY OF TROPONIN QUANT: CPT

## 2024-12-26 PROCEDURE — 82550 ASSAY OF CK (CPK): CPT

## 2024-12-26 PROCEDURE — 99223 1ST HOSP IP/OBS HIGH 75: CPT

## 2024-12-26 PROCEDURE — 36415 COLL VENOUS BLD VENIPUNCTURE: CPT

## 2024-12-26 PROCEDURE — 93017 CV STRESS TEST TRACING ONLY: CPT

## 2024-12-26 PROCEDURE — 85730 THROMBOPLASTIN TIME PARTIAL: CPT

## 2024-12-26 PROCEDURE — 78452 HT MUSCLE IMAGE SPECT MULT: CPT

## 2024-12-26 PROCEDURE — A9500: CPT

## 2024-12-26 PROCEDURE — 85025 COMPLETE CBC W/AUTO DIFF WBC: CPT

## 2024-12-26 PROCEDURE — 71045 X-RAY EXAM CHEST 1 VIEW: CPT | Mod: 26

## 2024-12-26 PROCEDURE — 97530 THERAPEUTIC ACTIVITIES: CPT | Mod: GP

## 2024-12-26 PROCEDURE — 83036 HEMOGLOBIN GLYCOSYLATED A1C: CPT

## 2024-12-26 PROCEDURE — 80061 LIPID PANEL: CPT

## 2024-12-26 PROCEDURE — 93306 TTE W/DOPPLER COMPLETE: CPT | Mod: 26

## 2024-12-26 PROCEDURE — 93005 ELECTROCARDIOGRAM TRACING: CPT

## 2024-12-26 PROCEDURE — 93010 ELECTROCARDIOGRAM REPORT: CPT

## 2024-12-26 PROCEDURE — 85027 COMPLETE CBC AUTOMATED: CPT

## 2024-12-26 PROCEDURE — 99291 CRITICAL CARE FIRST HOUR: CPT

## 2024-12-26 PROCEDURE — 99497 ADVNCD CARE PLAN 30 MIN: CPT | Mod: 25

## 2024-12-26 PROCEDURE — 97162 PT EVAL MOD COMPLEX 30 MIN: CPT | Mod: GP

## 2024-12-26 PROCEDURE — 80053 COMPREHEN METABOLIC PANEL: CPT

## 2024-12-26 RX ORDER — DILTIAZEM HYDROCHLORIDE 300 MG/1
60 CAPSULE, COATED, EXTENDED RELEASE ORAL ONCE
Refills: 0 | Status: COMPLETED | OUTPATIENT
Start: 2024-12-26 | End: 2024-12-26

## 2024-12-26 RX ORDER — APIXABAN 5 MG/1
5 TABLET, FILM COATED ORAL
Refills: 0 | Status: DISCONTINUED | OUTPATIENT
Start: 2024-12-26 | End: 2024-12-27

## 2024-12-26 RX ORDER — METOPROLOL TARTRATE 50 MG
50 TABLET ORAL DAILY
Refills: 0 | Status: DISCONTINUED | OUTPATIENT
Start: 2024-12-26 | End: 2024-12-30

## 2024-12-26 RX ORDER — DONEPEZIL HYDROCHLORIDE 5 MG/1
1 TABLET, FILM COATED ORAL
Refills: 0 | DISCHARGE

## 2024-12-26 RX ORDER — FENOFIBRATE 48 MG/1
145 TABLET ORAL DAILY
Refills: 0 | Status: DISCONTINUED | OUTPATIENT
Start: 2024-12-26 | End: 2024-12-30

## 2024-12-26 RX ORDER — ATORVASTATIN CALCIUM 40 MG/1
10 TABLET, FILM COATED ORAL AT BEDTIME
Refills: 0 | Status: DISCONTINUED | OUTPATIENT
Start: 2024-12-26 | End: 2024-12-29

## 2024-12-26 RX ORDER — DILTIAZEM HYDROCHLORIDE 300 MG/1
120 CAPSULE, COATED, EXTENDED RELEASE ORAL DAILY
Refills: 0 | Status: DISCONTINUED | OUTPATIENT
Start: 2024-12-26 | End: 2024-12-30

## 2024-12-26 RX ORDER — DONEPEZIL HYDROCHLORIDE 5 MG/1
5 TABLET, FILM COATED ORAL AT BEDTIME
Refills: 0 | Status: DISCONTINUED | OUTPATIENT
Start: 2024-12-26 | End: 2024-12-30

## 2024-12-26 RX ORDER — DILTIAZEM HYDROCHLORIDE 300 MG/1
10 CAPSULE, COATED, EXTENDED RELEASE ORAL ONCE
Refills: 0 | Status: COMPLETED | OUTPATIENT
Start: 2024-12-26 | End: 2024-12-26

## 2024-12-26 RX ORDER — GINKGO BILOBA 40 MG
3 CAPSULE ORAL AT BEDTIME
Refills: 0 | Status: DISCONTINUED | OUTPATIENT
Start: 2024-12-26 | End: 2024-12-30

## 2024-12-26 RX ORDER — ACETAMINOPHEN 80 MG/.8ML
650 SOLUTION/ DROPS ORAL ONCE
Refills: 0 | Status: COMPLETED | OUTPATIENT
Start: 2024-12-26 | End: 2024-12-27

## 2024-12-26 RX ORDER — ASPIRIN 81 MG
81 TABLET, DELAYED RELEASE (ENTERIC COATED) ORAL DAILY
Refills: 0 | Status: DISCONTINUED | OUTPATIENT
Start: 2024-12-26 | End: 2024-12-27

## 2024-12-26 RX ADMIN — APIXABAN 5 MILLIGRAM(S): 5 TABLET, FILM COATED ORAL at 21:16

## 2024-12-26 RX ADMIN — ATORVASTATIN CALCIUM 10 MILLIGRAM(S): 40 TABLET, FILM COATED ORAL at 21:16

## 2024-12-26 RX ADMIN — DILTIAZEM HYDROCHLORIDE 10 MILLIGRAM(S): 300 CAPSULE, COATED, EXTENDED RELEASE ORAL at 12:55

## 2024-12-26 RX ADMIN — DILTIAZEM HYDROCHLORIDE 60 MILLIGRAM(S): 300 CAPSULE, COATED, EXTENDED RELEASE ORAL at 13:27

## 2024-12-26 RX ADMIN — DONEPEZIL HYDROCHLORIDE 5 MILLIGRAM(S): 5 TABLET, FILM COATED ORAL at 21:16

## 2024-12-26 NOTE — H&P ADULT - CONVERSATION DETAILS
Writer met with pt Reviewed patient's medical and social history as well as events leading to patient's hospitalization. . Inquired about patient's wishes regarding extent of medical care to be provided including escalation of medical care into the ICU ,  intubation with vent and use of vasopressor support. In addition, the writer inquired about thoughts regarding cardiopulmonary resuscitation, artificial nutrition and hydration including use of feeding tubes and IVF, antibiotics, and further investigative studies such as blood draws and radiology pt showed good insight into medical condition. All questions answered.    Patient and son HCP karla  consented to DNR/DNI w/ trial of NIV  status.   MOLST form filled out and placed in chart.

## 2024-12-26 NOTE — PHARMACOTHERAPY INTERVENTION NOTE - COMMENTS
Medication history complete, reviewed medications with patient and confirmed with doctor first med hx. Per patient son at bedside patient stopped taking all her med a week ago.

## 2024-12-26 NOTE — PHYSICAL THERAPY INITIAL EVALUATION ADULT - NSACTIVITYREC_GEN_A_PT
out of bed to chair daily ( no patient chair at the bedside so I obtained one and set it up ) Pt should be out of bed to chair for meals,and amb tocommode/bathroom as tolerated with RW/1PA for safety as needed

## 2024-12-26 NOTE — PHYSICAL THERAPY INITIAL EVALUATION ADULT - LIVES WITH, PROFILE
resides 2nd floor apt at [facility] in [place] , has stair-lift chair to accesss stairs up/down/alone

## 2024-12-26 NOTE — ED ADULT NURSE NOTE - NSFALLRISKINTERV_ED_ALL_ED
Assistance OOB with selected safe patient handling equipment if applicable/Communicate fall risk and risk factors to all staff, patient, and family/Provide visual cue: yellow wristband, yellow gown, etc/Reinforce activity limits and safety measures with patient and family/Toileting schedule using arm’s reach rule for commode and bathroom/Call bell, personal items and telephone in reach/Instruct patient to call for assistance before getting out of bed/chair/stretcher/Non-slip footwear applied when patient is off stretcher/[place] to call system/Physically safe environment - no spills, clutter or unnecessary equipment/Purposeful Proactive Rounding/Room/bathroom lighting operational, light cord in reach

## 2024-12-26 NOTE — H&P ADULT - NSHPPHYSICALEXAM_GEN_ALL_CORE
Vital Signs Last 24 Hrs  T(C): 37 (26 Dec 2024 15:30), Max: 37 (26 Dec 2024 15:30)  T(F): 98.6 (26 Dec 2024 15:30), Max: 98.6 (26 Dec 2024 15:30)  HR: 93 (26 Dec 2024 15:30) (78 - 149)  BP: 103/68 (26 Dec 2024 15:30) (82/50 - 122/86)  BP(mean): 79 (26 Dec 2024 15:30) (61 - 97)  RR: 18 (26 Dec 2024 15:30) (14 - 23)  SpO2: 96% (26 Dec 2024 15:30) (92% - 100%)    Parameters below as of 26 Dec 2024 15:30  Patient On (Oxygen Delivery Method): room air    GENERAL:  no acute distress  EYES: sclera clear, EOM intact, PERRLA, no exudates  ENMT: normocephalic, atraumatic, moist mucous membranes,  NECK: supple, soft,  LUNGS: good air entry bilaterally, clear to auscultation, symmetric breath sounds, no wheezing or rhonchi appreciated  HEART: soft S1/S2, Irregular rate and rhythm, no murmurs noted, no lower extremity edema  GASTROINTESTINAL: abdomen is soft, nontender, distended, normoactive bowel sounds  INTEGUMENT: good skin turgor, no lesions noted  MUSCULOSKELETAL: no cyanosis, clubbing, edema, calves nontender to palpation b/l  NEUROLOGIC: awake, alert,  good muscle tone in 4 extremities, no obvious sensory deficits

## 2024-12-26 NOTE — ED PROVIDER NOTE - PHYSICAL EXAMINATION
Gen'l: Obese older WF adult, alert, mildly ill, mild respiratory discomfort, no sentence shortening  Head: NC/AT  Eyes: PERRL, EOMI  ENT: O/P clear, mm mildly dry  CV: + Tachycardic, irregularly irregular, normal radial pulse  Lungs: CTA,  mild tachypnea, no retractions  GI: soft, NT, BS+  : Deferred, no flank nor CVAT  Neck: NT, supple w/o pain  MSK: DOBBINS x 4, no focal extremity swelling nor tenderness, B/L SLR 30 degrees w/o pain, 4+/5 motor & symmetric  Skin: no tactile warmth, no rash  Neuro: A+O x 4, CN 2 -12 intact, normal speech, no focal motor/sensory deficits

## 2024-12-26 NOTE — H&P ADULT - ASSESSMENT
74-yo non compliant F with PMH includes HLD, HTN, A-Fib on Eliquis came in from home s/p near-syncopal episode associated with chest discomfort & palpitations.  Patient reports this a.m. while showering felt acute onset severe lightheadedness /near/syncopal with mild mid chest pressure discomfort & rapid palpitations.        # chest pain -- resolved   # A fib with RVR   # Elevated troponin   -  no palpitation or chest pain Episode near syncope at home   - s/p Cardizem 120mg x1 then 10mg x1 in ER   - EKG a fib with RVR  143 bpm Qtc 456ms   - trop first negative , 2nd 78 trend to peak   -  Pro BNP 1027  - CXR  negative  - hr controlled in 90's   - TTE 2022 Normal left ventricular internal dimensions and systolic function,  LVEF of 60%  - Continue home Cardizem, BB  - continue Eliquis BID  - REPEAT TTE   - cardio consulted, follow rec    # HLD  - Continue statin and fibrates     # DVT pro   - Eliquis BID     # GOC :  MOLST filled at bedside DNR /DNI w/ trial of NIV  status.

## 2024-12-26 NOTE — PHYSICAL THERAPY INITIAL EVALUATION ADULT - PERTINENT HX OF CURRENT PROBLEM, REHAB EVAL
74-year-old WF, PMH includes HLD, ? HTN, A-Fib '22 ( patient believes she is still on Eliquis and Metoprolol), BIBA from home s/p near-syncopal episode associated with chest discomfort & palpitations.  Patient reports this a.m. while showering felt acute onset severe lightheadedness/near/syncopal with mild mid chest pressure discomfort & rapid palpitations.  No LOC incurred, patient quickly rested upon the bed and called 911.  Patient reports intermittent mid-chest pressure discomfort since yesterday, 6-7 episodes altogether, each episode 30 minutes or less duration mild to moderate severity.  Patient currently denies chest pain, feels slightly lightheaded and some slight rapid palpitations.  Patient denies SOB, F/C, cough, abdominal pain, N/V, LOC.  	PMH:   HLD, ? HTN, A-Fib Dx'ed 2022 (Eliquis/Metoprolol) w/ echo then normal  LV/RV and EF 60%; diverticulitis, thyroid nodule, ventral hernia repair with mesh.

## 2024-12-26 NOTE — ED PROVIDER NOTE - CLINICAL SUMMARY MEDICAL DECISION MAKING FREE TEXT BOX
Plan: EKG, CXR, cardiac labs incl. Troponin/BNP/coags.  IV Cardizem for rate control.  Monitor, observe, reassess.  Expect Tele admit. Plan: EKG, CXR, cardiac labs incl. Troponin/BNP/coags.  IV Cardizem for rate control.  Monitor, observe, reassess.  Expect Tele admit.    14:20, C MD Jennifer:  Initial borderline blood pressure stabilized with  mL in light mL bolus.  Rate control achieved with IV and p.o. Cardizem.  No active chest pain currently.  Labs results reviewed not actionable, BNP 1000 with negative troponin.  Chest x-ray wet read NOLAN.  Case discussed with Dr. Vivas and telemetry admission accepted.  I will include cardiology consult in ED bridge orders. 74-year-old WF, PMH includes HLD, ? HTN, A-Fib '22 (believes she is still on Eliquis and Metoprolol), BIBA from home s/p near-syncopal episode associated with chest discomfort & palpitations. While showering, + acute onset severe lightheadedness/near-syncopal with mild mid chest pressure discomfort & rapid palpitations.  No LOC. Patient reports intermittent mid-chest pressure discomfort since yesterday.  Cardiac monitor + rapid A-Fib upon ED arrival.    Plan: EKG, CXR, cardiac labs incl. Troponin/BNP/coags.  IV Cardizem for rate control.  Monitor, observe, reassess.  Expect Tele admit.    14:20, C MD Jennifer:  Initial borderline low blood pressure stabilized with  mL bolus.  Rate control achieved with IV and p.o. Cardizem.  No active chest pain currently.  Labs results reviewed not actionable, BNP 1000 with negative troponin.  Chest x-ray wet read NOLAN.  Case discussed with Dr. Rosales and Telemetry admission accepted.  I will include Cardiology consult in ED bridge orders.

## 2024-12-26 NOTE — PHYSICAL THERAPY INITIAL EVALUATION ADULT - GENERAL OBSERVATIONS, REHAB EVAL
semi reclined in bed awake,alert , Ox3,feeling much better since admission ,nathalia Garrido and HARRY Solano at the bedside who seem concerned/supportive semi reclined in bed awake, alert , Ox3,feeling much better since admission ,nathalia Garrido and HARRY Solano at the bedside who seem concerned/supportive

## 2024-12-26 NOTE — PHYSICAL THERAPY INITIAL EVALUATION ADULT - ADL SKILLS, REHAB EVAL
has part-time [facility] x 4 hours/4 days per week; pt admits to non-compliance with meds as ordered/needed assist

## 2024-12-26 NOTE — ED PROVIDER NOTE - SECONDARY DIAGNOSIS.
Chronic atrial fibrillation with rapid ventricular response Chest pain with high risk of acute coronary syndrome

## 2024-12-26 NOTE — H&P ADULT - HISTORY OF PRESENT ILLNESS
74-yo non compliant F with PMH includes HLD, HTN, A-Fib on Eliquis came in from home s/p near-syncopal episode associated with chest discomfort & palpitations.  Patient reports this a.m. while showering felt acute onset severe lightheadedness/near/syncopal with mild mid chest pressure discomfort & rapid palpitations.  No LOC incurred, patient quickly rested upon the bed and called 911.  Patient reports intermittent mid-chest pressure discomfort since yesterday, 6-7 episodes altogether, each episode 30 minutes or less duration mild to moderate severity.  Patient currently denies chest pain, no palpitation   Patient has aide at home, comes for 4-6 hrs.  Per family  pt is not taking medications for past 1 week. per patient she just stop taking everything   MOLST filled at bedside DNR /DNI w/ trial of NIV  status.       IN ED   VITALS:/52  temp 98 F rr 18 On RA   LABS:Na 132  Pro BNP 1027  trop first negative , next 78   CXR : Linear subsegmental atelectasis and/or fibrosis at the left lower lung   zone, similar to prior exam.No lobar lung consolidation. Old fracture deformity left humerus.    EKG a fib with RVR  143 bpm Qtc 456ms       TTE 2022 Normal left ventricular internal dimensions and systolic function,   estimated LVEF of 60%.  Grossly normal RV size and systolic function.  Normal trileaflet aortic valve, without AI.  Trace physiologic MR  Mild TR.  No significant pericardial effusion.      s/p cardizem 120mg x1 then 10mg x1

## 2024-12-26 NOTE — ED ADULT TRIAGE NOTE - AVIAN FLU SYMPTOMS
Walking - Indoors allowed/Walking - Outdoors allowed/Follow Instructions Provided by your Surgical Team
No

## 2024-12-26 NOTE — PHYSICAL THERAPY INITIAL EVALUATION ADULT - ADDITIONAL COMMENTS
pt with h/o fall and transverse sacral fx July 2022, attended [facility] on discharge ( had Covid at that time as well )

## 2024-12-26 NOTE — ED PROVIDER NOTE - STUDIES
EKG - see results section for interpretation EKG - see results section for interpretation/Xray Image(s) - see wet read section for interpretation EKG - see results section for interpretation/Xray Image(s) - see wet read section for interpretation/Rhythm Strip

## 2024-12-26 NOTE — ED ADULT TRIAGE NOTE - CHIEF COMPLAINT QUOTE
Pt BIBEMS from home, c/o constant L sided chest pain since yesterday. Radiating to the L arm. No pain meds PTA. Denies SOB. STAT EKG to be completed.

## 2024-12-26 NOTE — ED PROVIDER NOTE - OBJECTIVE STATEMENT
74-year-old WF, PMH includes HLD, ? HTN, A-Fib '22 ( patient believes she is still on Eliquis and metoprolol), B IBA from home s/p near-syncopal episode associated with chest discomfort & palpitations.  Patient reports this a.m. while showering felt acute onset severe lightheadedness/near/syncopal with mild mid chest pressure discomfort & rapid palpitations.  No LOC incurred, patient quickly rested upon the bed and called 911.  Patient reports intermittent mid-chest pressure discomfort since yesterday, 6-7 episodes altogether, each episode 30 minutes or less duration mild to moderate severity.  Patient currently denies chest pain, feels slightly lightheaded and some slight rapid palpitations.  Patient denies SOB, F/C, cough, abdominal pain, N/V, LOC.  PMH:   HLD, ? HTN, A-Fib Dx'ed 2022 (Eliquis/Metoprolol) w/ echo then normal  LV/RV and EF 60%; diverticulitis, thyroid nodule, ventral hernia repair with mesh.  PCP: Bart    Cardio: denies but records show Velma Benitez in '22 (in [place].) 74-year-old WF, PMH includes HLD, ? HTN, A-Fib '22 ( patient believes she is still on Eliquis and Metoprolol), BIBA from home s/p near-syncopal episode associated with chest discomfort & palpitations.  Patient reports this a.m. while showering felt acute onset severe lightheadedness/near-syncopal with mild mid chest pressure discomfort & rapid palpitations.  No LOC incurred, patient quickly rested upon the bed and called 911.  Patient reports intermittent mid-chest pressure discomfort since yesterday, 6-7 episodes altogether, each episode 30 minutes or less duration, mild to moderate severity.  Patient currently denies chest pain, feels slightly lightheaded and some slight rapid palpitations.  Patient denies SOB, F/C, cough, abdominal pain, N/V, LOC.  PMH:   HLD, ? HTN, A-Fib Dx'ed 2022 (Eliquis/Metoprolol) w/ echo then normal  LV/RV and EF 60%; diverticulitis, thyroid nodule, ventral hernia repair with mesh.  PCP: Bart    Cardio: denies but records show Velma Benitez in '22 (in [place].)

## 2024-12-26 NOTE — ED PROVIDER NOTE - CARE PLAN
Principal Discharge DX:	Near syncope  Secondary Diagnosis:	Chronic atrial fibrillation with rapid ventricular response  Secondary Diagnosis:	Chest pain with high risk of acute coronary syndrome   1

## 2024-12-26 NOTE — H&P ADULT - NSHPROSALLOTHERNEGRD_GEN_ALL_CORE
Faxed back and sent to [facility]  
Reason for Call:  Form, our goal is to have forms completed with 72 hours, however, some forms may require a visit or additional information.    Type of letter, form or note:  medical    Who is the form from?: Home care    Where did the form come from: form was faxed in    What clinic location was the form placed at?: [facility]    Where the form was placed: 's Box: Evaristo Magaña MD    What number is listed as a contact on the form?: [phone]       Additional comments: [facility] 11/5/17 to 1/3/18    Call taken on 12/12/2017 at 2:10 PM by Alexandrea Yun      
All other review of systems negative, except as noted in HPI

## 2024-12-26 NOTE — ED ADULT NURSE NOTE - OBJECTIVE STATEMENT
Pt brought in by ambulance from home for complaint of palpitations and near syncope. Pt states that she was in the shower when she felt lightheaded and her heart racing. Pt sat on the bed an called 911. Pt with hx of afib on eliquis. Upon arrival pt with . Pt A&O 2-3. Pt not a good historian. Pt admits that her mind is going that she cannot remember anything/. Pt currently at baseline mental status. Pt denies any SOB, N/V. Pt states that she has an aide that comes to help her 4 hours a day a few days a week.

## 2024-12-26 NOTE — PHYSICAL THERAPY INITIAL EVALUATION ADULT - SKIN INTEGRITY
Patient is a 75y old  Male who presents with a chief complaint of UTI (09 Aug 2018 13:37)    Being followed by ID for bacteremia    Interval history:pt with no verbal response but appears comfortable  nelson removed  s/p PICC line placement   No acute events      ROS:  Not obtainable      Antimicrobials:    ampicillin  IVPB 2 Gram(s) IV Intermittent every 6 hours  cefTRIAXone   IVPB 1 Gram(s) IV Intermittent every 24 hours  darunavir 800 milliGRAM(s) Oral daily  emtricitabine 200 mG/tenofovir 300 mG (TRUVADA) 1 Tablet(s) Oral daily  ritonavir  Solution 100 milliGRAM(s) Oral daily    Other medications reviewed    Vital Signs Last 24 Hrs  T(C): 37 (08-14-18 @ 05:40), Max: 37.2 (08-13-18 @ 22:04)  T(F): 98.6 (08-14-18 @ 05:40), Max: 98.9 (08-13-18 @ 22:04)  HR: 73 (08-14-18 @ 05:40) (73 - 85)  BP: 105/68 (08-14-18 @ 05:40) (94/55 - 116/77)  BP(mean): --  RR: 17 (08-14-18 @ 05:40) (17 - 18)  SpO2: 98% (08-14-18 @ 05:40) (97% - 99%)    Physical Exam:        HEENT PERRLA   No oral exudate or erythema    Chest Good AE,CTA    CVS RRR S1 S2 WNl No murmur or rub or gallop    Abd soft BS normal No tenderness no masses    R PICC  site no erythema tenderness or discharge    CNS alert awake but no verbal response     Lab Data:                          10.9   8.37  )-----------( 204      ( 14 Aug 2018 08:04 )             31.7       08-14    142  |  102  |  18  ----------------------------<  109<H>  4.0   |  30  |  1.04    Ca    9.3      14 Aug 2018 07:05          Culture - Blood (collected 09 Aug 2018 15:02)  Source: .Blood Blood-Peripheral  Preliminary Report (10 Aug 2018 16:01):    No growth to date.    Culture - Blood (collected 09 Aug 2018 15:02)  Source: .Blood Blood-Peripheral  Preliminary Report (10 Aug 2018 16:01):    No growth to date. intact

## 2024-12-26 NOTE — PHYSICAL THERAPY INITIAL EVALUATION ADULT - ACTIVE RANGE OF MOTION EXAMINATION, REHAB EVAL
giselle. upper extremity Active ROM was WNL (within normal limits)/bilateral upper extremity Active ROM was WFL (within functional limits)

## 2024-12-27 DIAGNOSIS — I48.91 UNSPECIFIED ATRIAL FIBRILLATION: ICD-10-CM

## 2024-12-27 DIAGNOSIS — R07.9 CHEST PAIN, UNSPECIFIED: ICD-10-CM

## 2024-12-27 DIAGNOSIS — R79.89 OTHER SPECIFIED ABNORMAL FINDINGS OF BLOOD CHEMISTRY: ICD-10-CM

## 2024-12-27 LAB
A1C WITH ESTIMATED AVERAGE GLUCOSE RESULT: 5.3 % — SIGNIFICANT CHANGE UP (ref 4–5.6)
ALBUMIN SERPL ELPH-MCNC: 3.1 G/DL — LOW (ref 3.3–5)
ALP SERPL-CCNC: 71 U/L — SIGNIFICANT CHANGE UP (ref 40–120)
ALT FLD-CCNC: 19 U/L — SIGNIFICANT CHANGE UP (ref 12–78)
ANION GAP SERPL CALC-SCNC: 5 MMOL/L — SIGNIFICANT CHANGE UP (ref 5–17)
APTT BLD: 32.1 SEC — SIGNIFICANT CHANGE UP (ref 24.5–35.6)
APTT BLD: 51.5 SEC — HIGH (ref 24.5–35.6)
APTT BLD: 59.4 SEC — HIGH (ref 24.5–35.6)
AST SERPL-CCNC: 15 U/L — SIGNIFICANT CHANGE UP (ref 15–37)
BASOPHILS # BLD AUTO: 0.04 K/UL — SIGNIFICANT CHANGE UP (ref 0–0.2)
BASOPHILS NFR BLD AUTO: 0.7 % — SIGNIFICANT CHANGE UP (ref 0–2)
BILIRUB SERPL-MCNC: 0.7 MG/DL — SIGNIFICANT CHANGE UP (ref 0.2–1.2)
BUN SERPL-MCNC: 12 MG/DL — SIGNIFICANT CHANGE UP (ref 7–23)
CALCIUM SERPL-MCNC: 8.3 MG/DL — LOW (ref 8.5–10.1)
CHLORIDE SERPL-SCNC: 107 MMOL/L — SIGNIFICANT CHANGE UP (ref 96–108)
CHOLEST SERPL-MCNC: 156 MG/DL — SIGNIFICANT CHANGE UP
CO2 SERPL-SCNC: 22 MMOL/L — SIGNIFICANT CHANGE UP (ref 22–31)
CREAT SERPL-MCNC: 0.78 MG/DL — SIGNIFICANT CHANGE UP (ref 0.5–1.3)
EGFR: 79 ML/MIN/1.73M2 — SIGNIFICANT CHANGE UP
EOSINOPHIL # BLD AUTO: 0.06 K/UL — SIGNIFICANT CHANGE UP (ref 0–0.5)
EOSINOPHIL NFR BLD AUTO: 1.1 % — SIGNIFICANT CHANGE UP (ref 0–6)
ESTIMATED AVERAGE GLUCOSE: 105 MG/DL — SIGNIFICANT CHANGE UP (ref 68–114)
GLUCOSE SERPL-MCNC: 109 MG/DL — HIGH (ref 70–99)
HCT VFR BLD CALC: 37 % — SIGNIFICANT CHANGE UP (ref 34.5–45)
HCT VFR BLD CALC: 38.1 % — SIGNIFICANT CHANGE UP (ref 34.5–45)
HDLC SERPL-MCNC: 38 MG/DL — LOW
HGB BLD-MCNC: 12.8 G/DL — SIGNIFICANT CHANGE UP (ref 11.5–15.5)
HGB BLD-MCNC: 13.1 G/DL — SIGNIFICANT CHANGE UP (ref 11.5–15.5)
IMM GRANULOCYTES NFR BLD AUTO: 0.4 % — SIGNIFICANT CHANGE UP (ref 0–0.9)
LIPID PNL WITH DIRECT LDL SERPL: 100 MG/DL — HIGH
LYMPHOCYTES # BLD AUTO: 1.46 K/UL — SIGNIFICANT CHANGE UP (ref 1–3.3)
LYMPHOCYTES # BLD AUTO: 26.1 % — SIGNIFICANT CHANGE UP (ref 13–44)
MCHC RBC-ENTMCNC: 29.5 PG — SIGNIFICANT CHANGE UP (ref 27–34)
MCHC RBC-ENTMCNC: 29.8 PG — SIGNIFICANT CHANGE UP (ref 27–34)
MCHC RBC-ENTMCNC: 34.4 G/DL — SIGNIFICANT CHANGE UP (ref 32–36)
MCHC RBC-ENTMCNC: 34.6 G/DL — SIGNIFICANT CHANGE UP (ref 32–36)
MCV RBC AUTO: 85.8 FL — SIGNIFICANT CHANGE UP (ref 80–100)
MCV RBC AUTO: 86 FL — SIGNIFICANT CHANGE UP (ref 80–100)
MONOCYTES # BLD AUTO: 0.91 K/UL — HIGH (ref 0–0.9)
MONOCYTES NFR BLD AUTO: 16.3 % — HIGH (ref 2–14)
NEUTROPHILS # BLD AUTO: 3.11 K/UL — SIGNIFICANT CHANGE UP (ref 1.8–7.4)
NEUTROPHILS NFR BLD AUTO: 55.4 % — SIGNIFICANT CHANGE UP (ref 43–77)
NON HDL CHOLESTEROL: 118 MG/DL — SIGNIFICANT CHANGE UP
PLATELET # BLD AUTO: 241 K/UL — SIGNIFICANT CHANGE UP (ref 150–400)
PLATELET # BLD AUTO: 256 K/UL — SIGNIFICANT CHANGE UP (ref 150–400)
POTASSIUM SERPL-MCNC: 3.4 MMOL/L — LOW (ref 3.5–5.3)
POTASSIUM SERPL-SCNC: 3.4 MMOL/L — LOW (ref 3.5–5.3)
PROT SERPL-MCNC: 6.4 GM/DL — SIGNIFICANT CHANGE UP (ref 6–8.3)
RBC # BLD: 4.3 M/UL — SIGNIFICANT CHANGE UP (ref 3.8–5.2)
RBC # BLD: 4.44 M/UL — SIGNIFICANT CHANGE UP (ref 3.8–5.2)
RBC # FLD: 13.3 % — SIGNIFICANT CHANGE UP (ref 10.3–14.5)
RBC # FLD: 13.3 % — SIGNIFICANT CHANGE UP (ref 10.3–14.5)
SODIUM SERPL-SCNC: 134 MMOL/L — LOW (ref 135–145)
TRIGL SERPL-MCNC: 98 MG/DL — SIGNIFICANT CHANGE UP
TROPONIN I, HIGH SENSITIVITY RESULT: 131.29 NG/L — HIGH
TROPONIN I, HIGH SENSITIVITY RESULT: 174.43 NG/L — HIGH
WBC # BLD: 5.6 K/UL — SIGNIFICANT CHANGE UP (ref 3.8–10.5)
WBC # BLD: 6.12 K/UL — SIGNIFICANT CHANGE UP (ref 3.8–10.5)
WBC # FLD AUTO: 5.6 K/UL — SIGNIFICANT CHANGE UP (ref 3.8–10.5)
WBC # FLD AUTO: 6.12 K/UL — SIGNIFICANT CHANGE UP (ref 3.8–10.5)

## 2024-12-27 PROCEDURE — 99233 SBSQ HOSP IP/OBS HIGH 50: CPT

## 2024-12-27 PROCEDURE — 99223 1ST HOSP IP/OBS HIGH 75: CPT

## 2024-12-27 PROCEDURE — 93010 ELECTROCARDIOGRAM REPORT: CPT

## 2024-12-27 RX ORDER — HEPARIN SODIUM 1000 [USP'U]/ML
INJECTION, SOLUTION INTRAVENOUS; SUBCUTANEOUS
Qty: 25000 | Refills: 0 | Status: DISCONTINUED | OUTPATIENT
Start: 2024-12-27 | End: 2024-12-30

## 2024-12-27 RX ORDER — ASPIRIN 81 MG
81 TABLET, DELAYED RELEASE (ENTERIC COATED) ORAL DAILY
Refills: 0 | Status: DISCONTINUED | OUTPATIENT
Start: 2024-12-27 | End: 2024-12-30

## 2024-12-27 RX ORDER — HEPARIN SODIUM 1000 [USP'U]/ML
4000 INJECTION, SOLUTION INTRAVENOUS; SUBCUTANEOUS EVERY 6 HOURS
Refills: 0 | Status: DISCONTINUED | OUTPATIENT
Start: 2024-12-27 | End: 2024-12-30

## 2024-12-27 RX ORDER — POTASSIUM CHLORIDE 600 MG/1
40 TABLET, FILM COATED, EXTENDED RELEASE ORAL ONCE
Refills: 0 | Status: COMPLETED | OUTPATIENT
Start: 2024-12-27 | End: 2024-12-27

## 2024-12-27 RX ORDER — SODIUM CHLORIDE 9 MG/ML
500 INJECTION, SOLUTION INTRAVENOUS ONCE
Refills: 0 | Status: COMPLETED | OUTPATIENT
Start: 2024-12-27 | End: 2024-12-27

## 2024-12-27 RX ADMIN — HEPARIN SODIUM 950 UNIT(S)/HR: 1000 INJECTION, SOLUTION INTRAVENOUS; SUBCUTANEOUS at 03:28

## 2024-12-27 RX ADMIN — HEPARIN SODIUM 1100 UNIT(S)/HR: 1000 INJECTION, SOLUTION INTRAVENOUS; SUBCUTANEOUS at 12:47

## 2024-12-27 RX ADMIN — FENOFIBRATE 145 MILLIGRAM(S): 48 TABLET ORAL at 11:07

## 2024-12-27 RX ADMIN — DILTIAZEM HYDROCHLORIDE 120 MILLIGRAM(S): 300 CAPSULE, COATED, EXTENDED RELEASE ORAL at 11:07

## 2024-12-27 RX ADMIN — Medication 81 MILLIGRAM(S): at 11:10

## 2024-12-27 RX ADMIN — ATORVASTATIN CALCIUM 10 MILLIGRAM(S): 40 TABLET, FILM COATED ORAL at 21:59

## 2024-12-27 RX ADMIN — HEPARIN SODIUM 950 UNIT(S)/HR: 1000 INJECTION, SOLUTION INTRAVENOUS; SUBCUTANEOUS at 07:45

## 2024-12-27 RX ADMIN — DONEPEZIL HYDROCHLORIDE 5 MILLIGRAM(S): 5 TABLET, FILM COATED ORAL at 21:59

## 2024-12-27 RX ADMIN — SODIUM CHLORIDE 1000 MILLILITER(S): 9 INJECTION, SOLUTION INTRAVENOUS at 17:25

## 2024-12-27 RX ADMIN — HEPARIN SODIUM 1100 UNIT(S)/HR: 1000 INJECTION, SOLUTION INTRAVENOUS; SUBCUTANEOUS at 19:44

## 2024-12-27 RX ADMIN — Medication 50 MILLIGRAM(S): at 11:07

## 2024-12-27 RX ADMIN — POTASSIUM CHLORIDE 40 MILLIEQUIVALENT(S): 600 TABLET, FILM COATED, EXTENDED RELEASE ORAL at 11:07

## 2024-12-27 RX ADMIN — ACETAMINOPHEN 650 MILLIGRAM(S): 80 SOLUTION/ DROPS ORAL at 11:11

## 2024-12-27 RX ADMIN — HEPARIN SODIUM 1100 UNIT(S)/HR: 1000 INJECTION, SOLUTION INTRAVENOUS; SUBCUTANEOUS at 20:18

## 2024-12-27 NOTE — CONSULT NOTE ADULT - PROBLEM SELECTOR RECOMMENDATION 9
pt presenting with dizziness found to be in rapid Afib - in setting medical non-adherence   rate now improved cont home diltiazem   on heparin gtt in place eliquis   favor rate control strategy due to med non-adherence

## 2024-12-27 NOTE — PROGRESS NOTE ADULT - SUBJECTIVE AND OBJECTIVE BOX
CHIEF COMPLAINT:    SUBJECTIVE/SIGNIFICANT INTERVAL EVENTS/OVERNIGHT EVENTS:    Review of Systems: 14 Point review of systems reviewed and reported as negative unless otherwise stated in HPI    FROM H&P:  74-yo non compliant F with PMH includes HLD, HTN, A-Fib on Eliquis came in from home s/p near-syncopal episode associated with chest discomfort & palpitations.  Patient reports this a.m. while showering felt acute onset severe lightheadedness/near/syncopal with mild mid chest pressure discomfort & rapid palpitations.  No LOC incurred, patient quickly rested upon the bed and called 911.  Patient reports intermittent mid-chest pressure discomfort since yesterday, 6-7 episodes altogether, each episode 30 minutes or less duration mild to moderate severity.  Patient currently denies chest pain, no palpitation   Patient has aide at home, comes for 4-6 hrs.  Per family  pt is not taking medications for past 1 week. per patient she just stop taking everything   MOLST filled at bedside DNR /DNI w/ trial of NIV  status.     12/27: pt feels better. admits to not taking meds for 1+ week      PHYSICAL EXAM:    T(C): 36.7 (12-27-24 @ 08:49), Max: 37 (12-26-24 @ 15:30)  HR: 96 (12-27-24 @ 08:49) (90 - 149)  BP: 72/59 (12-27-24 @ 12:10) (72/59 - 122/86)  RR: 18 (12-27-24 @ 08:49) (14 - 23)  SpO2: 96% (12-27-24 @ 08:49) (92% - 100%)    General: AAOx3; NAD  Head: AT/NC  ENT: Moist Mucous Membranes; No Injury  Eyes: EOMI;   Neck: Non-tender; No JVD  CVS: RRR, S1&S2, No murmur, No edema  Respiratory: Lungs CTA B/L; Normal Respiratory Effort  Abdomen/GI: Soft, non-tender, non-distended  Extremities: No cyanosis, No clubbing, No edema  Neuro: AAOx3  Psych: Appropriate, Cooperative, No depression, No anxiety  Skin: Clean, Dry and Intact      LABS:                          13.1   6.12  )-----------( 256      ( 27 Dec 2024 09:17 )             38.1     12-27    134[L]  |  107  |  12  ----------------------------<  109[H]  3.4[L]   |  22  |  0.78    Ca    8.3[L]      27 Dec 2024 07:21  Mg     1.7     12-26    TPro  6.4  /  Alb  3.1[L]  /  TBili  0.7  /  DBili  x   /  AST  15  /  ALT  19  /  AlkPhos  71  12-27      CAPILLARY BLOOD GLUCOSE              RADIOLOGY:      EKG:      ECHO:      PROCEDURES:        I personally reviewed labs, imaging, ekg, orders and vitals.    Discussed case with:  []RN  []CM/SW  []Patient  []Family  []Specialist:        MEDICATIONS  (STANDING):  aspirin enteric coated 81 milliGRAM(s) Oral daily  atorvastatin 10 milliGRAM(s) Oral at bedtime  diltiazem    milliGRAM(s) Oral daily  donepezil 5 milliGRAM(s) Oral at bedtime  fenofibrate Tablet 145 milliGRAM(s) Oral daily  heparin  Infusion.  Unit(s)/Hr (9.5 mL/Hr) IV Continuous <Continuous>  metoprolol succinate ER 50 milliGRAM(s) Oral daily    MEDICATIONS  (PRN):  heparin   Injectable 4000 Unit(s) IV Push every 6 hours PRN For aPTT less than 40  melatonin 3 milliGRAM(s) Oral at bedtime PRN Insomnia

## 2024-12-27 NOTE — CONSULT NOTE ADULT - SUBJECTIVE AND OBJECTIVE BOX
CHIEF COMPLAINT: dizziness       HPI:    74-yo non compliant F with PMH includes HLD, HTN, A-Fib on Eliquis came in from home s/p near-syncopal episode associated with chest discomfort & palpitations.  Patient reports this a.m. while showering felt acute onset severe lightheadedness/near/syncopal with mild mid chest pressure discomfort & rapid palpitations.  No LOC incurred, patient quickly rested upon the bed and called 911.  Patient reports intermittent mid-chest pressure discomfort since yesterday, 6-7 episodes altogether, each episode 30 minutes or less duration mild to moderate severity.  Patient currently denies chest pain, no palpitation   Patient has aide at home, comes for 4-6 hrs.  Per family  pt is not taking medications for past 1 week. per patient she just stop taking everything   MOLST filled at bedside DNR /DNI w/ trial of NIV  status.       IN ED   VITALS:/52  temp 98 F rr 18 On RA   LABS:Na 132  Pro BNP 1027  trop first negative , next 78   CXR : Linear subsegmental atelectasis and/or fibrosis at the left lower lung   zone, similar to prior exam.No lobar lung consolidation. Old fracture deformity left humerus.    EKG a fib with RVR  143 bpm Qtc 456ms       TTE 2022 Normal left ventricular internal dimensions and systolic function,   estimated LVEF of 60%.  Grossly normal RV size and systolic function.  Normal trileaflet aortic valve, without AI.  Trace physiologic MR  Mild TR.  No significant pericardial effusion.      s/p cardizem 120mg x1 then 10mg x1 (26 Dec 2024 14:49)      PAST MEDICAL / SURGICAL HISTORY:  PAST MEDICAL & SURGICAL HISTORY:  HLD (hyperlipidemia)      HLD (hyperlipidemia)          SOCIAL HISTORY:   Alcohol: Denied  Smoking: Nonsmoker  Drug Use: Denied  Marital Status:     FAMILY HISTORY: FAMILY HISTORY:  FHx: heart disease  mther CABG age 58        MEDICATIONS  (STANDING):  aspirin enteric coated 81 milliGRAM(s) Oral daily  atorvastatin 10 milliGRAM(s) Oral at bedtime  diltiazem    milliGRAM(s) Oral daily  donepezil 5 milliGRAM(s) Oral at bedtime  fenofibrate Tablet 145 milliGRAM(s) Oral daily  heparin  Infusion.  Unit(s)/Hr (9.5 mL/Hr) IV Continuous <Continuous>  metoprolol succinate ER 50 milliGRAM(s) Oral daily    MEDICATIONS  (PRN):  heparin   Injectable 4000 Unit(s) IV Push every 6 hours PRN For aPTT less than 40  melatonin 3 milliGRAM(s) Oral at bedtime PRN Insomnia      Allergies    No Known Drug Allergies  eggs (Unknown)    Intolerances        REVIEW OF SYSTEMS:  CONSTITUTIONAL: No weakness, fevers or chills  Eyes: No visual changes  NECK: No pain or stiffness  RESPIRATORY: No cough, wheezing, hemoptysis; No shortness of breath  CARDIOVASCULAR: No chest pain or palpitations  GASTROINTESTINAL: No abdominal pain. No nausea, vomiting, or hematemesis; No diarrhea or constipation. No melena or hematochezia.  GENITOURINARY: No dysuria, frequency or hematuria  NEUROLOGICAL: No numbness.  SKIN: No itching or rash  All other review of systems is negative unless indicated above    VITAL SIGNS:   Vital Signs Last 24 Hrs  T(C): 36.7 (27 Dec 2024 08:49), Max: 37 (26 Dec 2024 15:30)  T(F): 98 (27 Dec 2024 08:49), Max: 98.6 (26 Dec 2024 15:30)  HR: 96 (27 Dec 2024 08:49) (90 - 96)  BP: 72/59 (27 Dec 2024 12:10) (72/59 - 111/77)  BP(mean): 74 (27 Dec 2024 08:49) (63 - 85)  RR: 18 (27 Dec 2024 08:49) (16 - 23)  SpO2: 96% (27 Dec 2024 08:49) (92% - 98%)    Parameters below as of 27 Dec 2024 08:49  Patient On (Oxygen Delivery Method): room air        I&O's Summary    26 Dec 2024 07:01  -  27 Dec 2024 07:00  --------------------------------------------------------  IN: 300 mL / OUT: 1250 mL / NET: -950 mL        PHYSICAL EXAM:  Constitutional: NAD, awake and alert  HEENT:  EOMI,  Pupils round, No oral cyanosis.  Pulmonary: Non-labored, breath sounds are clear bilaterally, No wheezing, rales or rhonchi  Cardiovascular: S1 and S2, regular rate and rhythm, no Murmurs, gallops or rubs  Gastrointestinal: Bowel Sounds present, soft, nontender.   Lymph: No peripheral edema. No cervical lymphadenopathy.  Neurological: Alert, no focal deficits  Skin: No rashes.  Psych:  Mood & affect appropriate    LABS:                        13.1   6.12  )-----------( 256      ( 27 Dec 2024 09:17 )             38.1                         12.8   5.60  )-----------( 241      ( 27 Dec 2024 07:21 )             37.0                         13.1   7.33  )-----------( 247      ( 26 Dec 2024 12:49 )             38.5     27 Dec 2024 07:21    134    |  107    |  12     ----------------------------<  109    3.4     |  22     |  0.78   26 Dec 2024 12:49    132    |  102    |  12     ----------------------------<  132    4.0     |  23     |  1.28     Ca    8.3        27 Dec 2024 07:21  Ca    9.5        26 Dec 2024 12:49  Mg     1.7       26 Dec 2024 12:49    TPro  6.4    /  Alb  3.1    /  TBili  0.7    /  DBili  x      /  AST  15     /  ALT  19     /  AlkPhos  71     27 Dec 2024 07:21  TPro  7.6    /  Alb  3.9    /  TBili  1.5    /  DBili  x      /  AST  16     /  ALT  22     /  AlkPhos  82     26 Dec 2024 12:49    PT/INR - ( 26 Dec 2024 12:49 )   PT: 11.9 sec;   INR: 1.01 ratio         PTT - ( 27 Dec 2024 09:17 )  PTT:51.5 sec        12-26 @ 12:49  TSH: 1.24       CHIEF COMPLAINT: dizziness       HPI:  Ms. Gannon is a 75 yo female with a hx Afib on eliquis, HTN, HLD presenting after feeling dizzy/lightheaded. Also experienced palpitations and intermittent chest discomfort.     Pt is a poor historian- unable to provide details of cardiac hx- aware she is on a "blood thinner" but unable to provide information on diagnosis of Afib. Per family, she had not taken medications for the past week.   Chest discomfort is somewhat reproducible on exam.     In the ED, labs notable for Hs trop 78, 127, 174 (peak), 131.   ECG Afib with RVR at 143 bpm            TTE 2022 Normal left ventricular internal dimensions and systolic function,   estimated LVEF of 60%.  Grossly normal RV size and systolic function.  Normal trileaflet aortic valve, without AI.  Trace physiologic MR  Mild TR.  No significant pericardial effusion.      s/p cardizem 120mg x1 then 10mg x1 (26 Dec 2024 14:49)      PAST MEDICAL / SURGICAL HISTORY:  PAST MEDICAL & SURGICAL HISTORY:  HLD (hyperlipidemia)      HLD (hyperlipidemia)          SOCIAL HISTORY:   Alcohol: Denied  Smoking: Nonsmoker  Drug Use: Denied  Marital Status:     FAMILY HISTORY: FAMILY HISTORY:  FHx: heart disease  mther CABG age 58        MEDICATIONS  (STANDING):  aspirin enteric coated 81 milliGRAM(s) Oral daily  atorvastatin 10 milliGRAM(s) Oral at bedtime  diltiazem    milliGRAM(s) Oral daily  donepezil 5 milliGRAM(s) Oral at bedtime  fenofibrate Tablet 145 milliGRAM(s) Oral daily  heparin  Infusion.  Unit(s)/Hr (9.5 mL/Hr) IV Continuous <Continuous>  metoprolol succinate ER 50 milliGRAM(s) Oral daily    MEDICATIONS  (PRN):  heparin   Injectable 4000 Unit(s) IV Push every 6 hours PRN For aPTT less than 40  melatonin 3 milliGRAM(s) Oral at bedtime PRN Insomnia      Allergies    No Known Drug Allergies  eggs (Unknown)    Intolerances        REVIEW OF SYSTEMS:  CONSTITUTIONAL: No weakness, fevers or chills  Eyes: No visual changes  NECK: No pain or stiffness  RESPIRATORY: No cough, wheezing, hemoptysis; No shortness of breath  CARDIOVASCULAR: No chest pain or palpitations  GASTROINTESTINAL: No abdominal pain. No nausea, vomiting, or hematemesis; No diarrhea or constipation. No melena or hematochezia.  GENITOURINARY: No dysuria, frequency or hematuria  NEUROLOGICAL: No numbness.  SKIN: No itching or rash  All other review of systems is negative unless indicated above    VITAL SIGNS:   Vital Signs Last 24 Hrs  T(C): 36.7 (27 Dec 2024 08:49), Max: 37 (26 Dec 2024 15:30)  T(F): 98 (27 Dec 2024 08:49), Max: 98.6 (26 Dec 2024 15:30)  HR: 96 (27 Dec 2024 08:49) (90 - 96)  BP: 72/59 (27 Dec 2024 12:10) (72/59 - 111/77)  BP(mean): 74 (27 Dec 2024 08:49) (63 - 85)  RR: 18 (27 Dec 2024 08:49) (16 - 23)  SpO2: 96% (27 Dec 2024 08:49) (92% - 98%)    Parameters below as of 27 Dec 2024 08:49  Patient On (Oxygen Delivery Method): room air        I&O's Summary    26 Dec 2024 07:01  -  27 Dec 2024 07:00  --------------------------------------------------------  IN: 300 mL / OUT: 1250 mL / NET: -950 mL        PHYSICAL EXAM:  Constitutional: NAD, awake and alert  HEENT:  EOMI,  Pupils round, No oral cyanosis.  Pulmonary: Non-labored, breath sounds are clear bilaterally, No wheezing, rales or rhonchi  Cardiovascular: S1 and S2, regular rate and rhythm, no Murmurs, gallops or rubs  Gastrointestinal: Bowel Sounds present, soft, nontender.   Lymph: No peripheral edema. No cervical lymphadenopathy.  Neurological: Alert, no focal deficits  Skin: No rashes.  Psych:  Mood & affect appropriate    LABS:                        13.1   6.12  )-----------( 256      ( 27 Dec 2024 09:17 )             38.1                         12.8   5.60  )-----------( 241      ( 27 Dec 2024 07:21 )             37.0                         13.1   7.33  )-----------( 247      ( 26 Dec 2024 12:49 )             38.5     27 Dec 2024 07:21    134    |  107    |  12     ----------------------------<  109    3.4     |  22     |  0.78   26 Dec 2024 12:49    132    |  102    |  12     ----------------------------<  132    4.0     |  23     |  1.28     Ca    8.3        27 Dec 2024 07:21  Ca    9.5        26 Dec 2024 12:49  Mg     1.7       26 Dec 2024 12:49    TPro  6.4    /  Alb  3.1    /  TBili  0.7    /  DBili  x      /  AST  15     /  ALT  19     /  AlkPhos  71     27 Dec 2024 07:21  TPro  7.6    /  Alb  3.9    /  TBili  1.5    /  DBili  x      /  AST  16     /  ALT  22     /  AlkPhos  82     26 Dec 2024 12:49    PT/INR - ( 26 Dec 2024 12:49 )   PT: 11.9 sec;   INR: 1.01 ratio         PTT - ( 27 Dec 2024 09:17 )  PTT:51.5 sec        12-26 @ 12:49  TSH: 1.24    < from: TTE W or WO Ultrasound Enhancing Agent (12.26.24 @ 15:32) >  CONCLUSIONS:      1. Left ventricular cavity is small. Left ventricular systolic function is normal with an ejection fraction visually estimated at 55 to 60 %.   2. Normal right ventricular cavity size.   3. Left atrium is normal in size.   4. The right atrium is normal in size.   5. Mild mitral regurgitation.   6. Structurally normal pulmonic valve with normal leaflet excursion.   7. Structurally normal mitral valve with normal leaflet excursion.   8. Structurally normal tricuspid valve with normal leaflet excursion. Mild to moderate tricuspid regurgitation.   9. Mild pulmonic regurgitation.  10. Trileafletaortic valve with normal systolic excursion.    < end of copied text >

## 2024-12-27 NOTE — CONSULT NOTE ADULT - PROBLEM SELECTOR RECOMMENDATION 2
mildly elevated in setting Afib with RVR. trend not consistent with ACS   echo with preserved function and no WMA   will obtain nuclear stress test on Monday 12/30 given c/o chest pain  cont aspirin, would increase atorvastatin 40mg

## 2024-12-28 LAB
APTT BLD: 43.5 SEC — HIGH (ref 24.5–35.6)
APTT BLD: 75.7 SEC — HIGH (ref 24.5–35.6)
APTT BLD: 77.9 SEC — HIGH (ref 24.5–35.6)
HCT VFR BLD CALC: 36 % — SIGNIFICANT CHANGE UP (ref 34.5–45)
HGB BLD-MCNC: 12.2 G/DL — SIGNIFICANT CHANGE UP (ref 11.5–15.5)
MCHC RBC-ENTMCNC: 29.5 PG — SIGNIFICANT CHANGE UP (ref 27–34)
MCHC RBC-ENTMCNC: 33.9 G/DL — SIGNIFICANT CHANGE UP (ref 32–36)
MCV RBC AUTO: 87 FL — SIGNIFICANT CHANGE UP (ref 80–100)
PLATELET # BLD AUTO: 266 K/UL — SIGNIFICANT CHANGE UP (ref 150–400)
RBC # BLD: 4.14 M/UL — SIGNIFICANT CHANGE UP (ref 3.8–5.2)
RBC # FLD: 13.6 % — SIGNIFICANT CHANGE UP (ref 10.3–14.5)
WBC # BLD: 5.46 K/UL — SIGNIFICANT CHANGE UP (ref 3.8–10.5)
WBC # FLD AUTO: 5.46 K/UL — SIGNIFICANT CHANGE UP (ref 3.8–10.5)

## 2024-12-28 PROCEDURE — 99232 SBSQ HOSP IP/OBS MODERATE 35: CPT

## 2024-12-28 PROCEDURE — 99233 SBSQ HOSP IP/OBS HIGH 50: CPT

## 2024-12-28 RX ADMIN — HEPARIN SODIUM 900 UNIT(S)/HR: 1000 INJECTION, SOLUTION INTRAVENOUS; SUBCUTANEOUS at 10:55

## 2024-12-28 RX ADMIN — ATORVASTATIN CALCIUM 10 MILLIGRAM(S): 40 TABLET, FILM COATED ORAL at 21:40

## 2024-12-28 RX ADMIN — HEPARIN SODIUM 1000 UNIT(S)/HR: 1000 INJECTION, SOLUTION INTRAVENOUS; SUBCUTANEOUS at 03:34

## 2024-12-28 RX ADMIN — Medication 81 MILLIGRAM(S): at 11:02

## 2024-12-28 RX ADMIN — DONEPEZIL HYDROCHLORIDE 5 MILLIGRAM(S): 5 TABLET, FILM COATED ORAL at 21:40

## 2024-12-28 RX ADMIN — FENOFIBRATE 145 MILLIGRAM(S): 48 TABLET ORAL at 11:02

## 2024-12-28 RX ADMIN — HEPARIN SODIUM 1050 UNIT(S)/HR: 1000 INJECTION, SOLUTION INTRAVENOUS; SUBCUTANEOUS at 19:28

## 2024-12-28 RX ADMIN — DILTIAZEM HYDROCHLORIDE 120 MILLIGRAM(S): 300 CAPSULE, COATED, EXTENDED RELEASE ORAL at 11:02

## 2024-12-28 RX ADMIN — HEPARIN SODIUM 1000 UNIT(S)/HR: 1000 INJECTION, SOLUTION INTRAVENOUS; SUBCUTANEOUS at 07:43

## 2024-12-28 RX ADMIN — HEPARIN SODIUM 1050 UNIT(S)/HR: 1000 INJECTION, SOLUTION INTRAVENOUS; SUBCUTANEOUS at 18:35

## 2024-12-28 RX ADMIN — Medication 50 MILLIGRAM(S): at 17:51

## 2024-12-28 NOTE — PROGRESS NOTE ADULT - SUBJECTIVE AND OBJECTIVE BOX
CHIEF COMPLAINT:    SUBJECTIVE/SIGNIFICANT INTERVAL EVENTS/OVERNIGHT EVENTS:    Review of Systems: 14 Point review of systems reviewed and reported as negative unless otherwise stated in HPI    FROM H&P:  74-yo non compliant F with PMH includes HLD, HTN, A-Fib on Eliquis came in from home s/p near-syncopal episode associated with chest discomfort & palpitations.  Patient reports this a.m. while showering felt acute onset severe lightheadedness/near/syncopal with mild mid chest pressure discomfort & rapid palpitations.  No LOC incurred, patient quickly rested upon the bed and called 911.  Patient reports intermittent mid-chest pressure discomfort since yesterday, 6-7 episodes altogether, each episode 30 minutes or less duration mild to moderate severity.  Patient currently denies chest pain, no palpitation   Patient has aide at home, comes for 4-6 hrs.  Per family  pt is not taking medications for past 1 week. per patient she just stop taking everything   MOLST filled at bedside DNR /DNI w/ trial of NIV  status.     12/27: pt feels better. admits to not taking meds for 1+ week  12/28: no acute events     PHYSICAL EXAM:    T(C): 36.7 (12-27-24 @ 08:49), Max: 37 (12-26-24 @ 15:30)  HR: 96 (12-27-24 @ 08:49) (90 - 149)  BP: 72/59 (12-27-24 @ 12:10) (72/59 - 122/86)  RR: 18 (12-27-24 @ 08:49) (14 - 23)  SpO2: 96% (12-27-24 @ 08:49) (92% - 100%)    General: AAOx3; NAD  Head: AT/NC  ENT: Moist Mucous Membranes; No Injury  Eyes: EOMI;   Neck: Non-tender; No JVD  CVS: RRR, S1&S2, No murmur, No edema  Respiratory: Lungs CTA B/L; Normal Respiratory Effort  Abdomen/GI: Soft, non-tender, non-distended  Extremities: No cyanosis, No clubbing, No edema  Neuro: AAOx3  Psych: Appropriate, Cooperative, No depression, No anxiety  Skin: Clean, Dry and Intact      LABS:                          13.1   6.12  )-----------( 256      ( 27 Dec 2024 09:17 )             38.1     12-27    134[L]  |  107  |  12  ----------------------------<  109[H]  3.4[L]   |  22  |  0.78    Ca    8.3[L]      27 Dec 2024 07:21  Mg     1.7     12-26    TPro  6.4  /  Alb  3.1[L]  /  TBili  0.7  /  DBili  x   /  AST  15  /  ALT  19  /  AlkPhos  71  12-27      CAPILLARY BLOOD GLUCOSE              RADIOLOGY:      EKG:      ECHO:      PROCEDURES:        I personally reviewed labs, imaging, ekg, orders and vitals.    Discussed case with:  []RN  []CM/SW  []Patient  []Family  []Specialist:        MEDICATIONS  (STANDING):  aspirin enteric coated 81 milliGRAM(s) Oral daily  atorvastatin 10 milliGRAM(s) Oral at bedtime  diltiazem    milliGRAM(s) Oral daily  donepezil 5 milliGRAM(s) Oral at bedtime  fenofibrate Tablet 145 milliGRAM(s) Oral daily  heparin  Infusion.  Unit(s)/Hr (9.5 mL/Hr) IV Continuous <Continuous>  metoprolol succinate ER 50 milliGRAM(s) Oral daily    MEDICATIONS  (PRN):  heparin   Injectable 4000 Unit(s) IV Push every 6 hours PRN For aPTT less than 40  melatonin 3 milliGRAM(s) Oral at bedtime PRN Insomnia

## 2024-12-28 NOTE — PROGRESS NOTE ADULT - PROBLEM SELECTOR PLAN 1
·  Problem: Atrial fibrillation.   ·  Recommendation: pt presenting with dizziness found to be in rapid Afib - in setting medical non-adherence   rate now improved cont home diltiazem   on heparin gtt in place of eliquis   favor rate control strategy due to med non-adherence.

## 2024-12-28 NOTE — PROGRESS NOTE ADULT - SUBJECTIVE AND OBJECTIVE BOX
CHIEF COMPLAINT: dizziness       HPI:  Ms. Gannon is a 73 yo female with a hx Afib on eliquis, HTN, HLD presenting after feeling dizzy/lightheaded. Also experienced palpitations and intermittent chest discomfort.     Pt is a poor historian- unable to provide details of cardiac hx- aware she is on a "blood thinner" but unable to provide information on diagnosis of Afib. Per family, she had not taken medications for the past week.   Chest discomfort is somewhat reproducible on exam.     In the ED, labs notable for Hs trop 78, 127, 174 (peak), 131.   ECG Afib with RVR at 143 bpm        TTE 2022 Normal left ventricular internal dimensions and systolic function,   estimated LVEF of 60%.  Grossly normal RV size and systolic function.  Normal trileaflet aortic valve, without AI.  Trace physiologic MR  Mild TR.  No significant pericardial effusion.      s/p cardizem 120mg x1 then 10mg x1 (26 Dec 2024 14:49)    12/28/24 - no cardiac complaints, Tele with Afib rate controlled      Home Medications:  apixaban 5 mg oral tablet: 1 tab(s) orally every 12 hours (26 Dec 2024 14:46)  aspirin 81 mg oral delayed release tablet: 1 tab(s) orally once a day (in the evening) (26 Dec 2024 14:46)  dilTIAZem 120 mg/24 hours oral capsule, extended release: 1 cap(s) orally once a day (26 Dec 2024 14:46)  donepezil 5 mg oral tablet: 1 tab(s) orally once a day (in the morning) (26 Dec 2024 14:46)  fenofibrate 160 mg oral tablet: 1 tab(s) orally once a day (26 Dec 2024 14:46)  metoprolol succinate 50 mg oral tablet, extended release: 1 tab(s) orally once a day (26 Dec 2024 14:46)  simvastatin 20 mg oral tablet: 1 tab(s) orally once a day (at bedtime) (26 Dec 2024 14:46)      MEDICATIONS  (STANDING):  aspirin enteric coated 81 milliGRAM(s) Oral daily  atorvastatin 10 milliGRAM(s) Oral at bedtime  diltiazem    milliGRAM(s) Oral daily  donepezil 5 milliGRAM(s) Oral at bedtime  fenofibrate Tablet 145 milliGRAM(s) Oral daily  heparin  Infusion.  Unit(s)/Hr (9.5 mL/Hr) IV Continuous <Continuous>  metoprolol succinate ER 50 milliGRAM(s) Oral daily    MEDICATIONS  (PRN):  heparin   Injectable 4000 Unit(s) IV Push every 6 hours PRN For aPTT less than 40  melatonin 3 milliGRAM(s) Oral at bedtime PRN Insomnia      Vital Signs Last 24 Hrs  T(C): 36.7 (28 Dec 2024 08:57), Max: 36.9 (27 Dec 2024 23:10)  T(F): 98.1 (28 Dec 2024 08:57), Max: 98.4 (27 Dec 2024 23:10)  HR: 82 (28 Dec 2024 11:02) (69 - 86)  BP: 102/68 (28 Dec 2024 11:02) (72/59 - 114/65)  BP(mean): --  RR: 18 (28 Dec 2024 08:57) (18 - 18)  SpO2: 97% (28 Dec 2024 08:57) (97% - 98%)    Parameters below as of 28 Dec 2024 08:57  Patient On (Oxygen Delivery Method): room air      PHYSICAL EXAM:  Constitutional: NAD, awake and alert  HEENT:  EOMI,  Pupils round, No oral cyanosis.  Pulmonary: Non-labored, breath sounds are clear bilaterally, No wheezing, rales or rhonchi  Cardiovascular: S1 and S2, regular rate and rhythm, no Murmurs, gallops or rubs  Gastrointestinal: Bowel Sounds present, soft, nontender.   Lymph: No peripheral edema. No cervical lymphadenopathy.  Neurological: Alert, no focal deficits  Skin: No rashes.  Psych:  Mood & affect appropriate    LABS: reviewed                            12.2   5.46  )-----------( 266      ( 28 Dec 2024 07:26 )             36.0     12-27    134[L]  |  107  |  12  ----------------------------<  109[H]  3.4[L]   |  22  |  0.78    Ca    8.3[L]      27 Dec 2024 07:21  Mg     1.7     12-26    TPro  6.4  /  Alb  3.1[L]  /  TBili  0.7  /  DBili  x   /  AST  15  /  ALT  19  /  AlkPhos  71  12-27    - TroponinI hsT: <-131.29, <-174.43, <-127.06, <-78.64, <-25.79           12-26 @ 12:49  TSH: 1.24    Radiology/EKG/TTE: reviewed     < from: TTE W or WO Ultrasound Enhancing Agent (12.26.24 @ 15:32) >  CONCLUSIONS:      1. Left ventricular cavity is small. Left ventricular systolic function is normal with an ejection fraction visually estimated at 55 to 60 %.   2. Normal right ventricular cavity size.   3. Left atrium is normal in size.   4. The right atrium is normal in size.   5. Mild mitral regurgitation.   6. Structurally normal pulmonic valve with normal leaflet excursion.   7. Structurally normal mitral valve with normal leaflet excursion.   8. Structurally normal tricuspid valve with normal leaflet excursion. Mild to moderate tricuspid regurgitation.   9. Mild pulmonic regurgitation.  10. Trileafletaortic valve with normal systolic excursion.    < end of copied text >       REASON FOR VISIT: AF    HPI:  Ms. Gannon is a 73 yo female with a hx Afib on eliquis, HTN, HLD presenting after feeling dizzy/lightheaded. Also experienced palpitations and intermittent chest discomfort.     Pt is a poor historian- unable to provide details of cardiac hx- aware she is on a "blood thinner" but unable to provide information on diagnosis of Afib. Per family, she had not taken medications for the past week.   Chest discomfort is somewhat reproducible on exam.     In the ED, labs notable for Hs trop 78, 127, 174 (peak), 131.   ECG Afib with RVR at 143 bpm        TTE 2022 Normal left ventricular internal dimensions and systolic function,   estimated LVEF of 60%.  Grossly normal RV size and systolic function.  Normal trileaflet aortic valve, without AI.  Trace physiologic MR  Mild TR.  No significant pericardial effusion.      s/p cardizem 120mg x1 then 10mg x1 (26 Dec 2024 14:49)    12/28/24 - no cardiac complaints, Tele with Afib rate controlled      Home Medications:  apixaban 5 mg oral tablet: 1 tab(s) orally every 12 hours (26 Dec 2024 14:46)  aspirin 81 mg oral delayed release tablet: 1 tab(s) orally once a day (in the evening) (26 Dec 2024 14:46)  dilTIAZem 120 mg/24 hours oral capsule, extended release: 1 cap(s) orally once a day (26 Dec 2024 14:46)  donepezil 5 mg oral tablet: 1 tab(s) orally once a day (in the morning) (26 Dec 2024 14:46)  fenofibrate 160 mg oral tablet: 1 tab(s) orally once a day (26 Dec 2024 14:46)  metoprolol succinate 50 mg oral tablet, extended release: 1 tab(s) orally once a day (26 Dec 2024 14:46)  simvastatin 20 mg oral tablet: 1 tab(s) orally once a day (at bedtime) (26 Dec 2024 14:46)    MEDICATIONS  (STANDING):  aspirin enteric coated 81 milliGRAM(s) Oral daily  atorvastatin 10 milliGRAM(s) Oral at bedtime  diltiazem    milliGRAM(s) Oral daily  donepezil 5 milliGRAM(s) Oral at bedtime  fenofibrate Tablet 145 milliGRAM(s) Oral daily  heparin  Infusion.  Unit(s)/Hr (9.5 mL/Hr) IV Continuous <Continuous>  metoprolol succinate ER 50 milliGRAM(s) Oral daily    MEDICATIONS  (PRN):  heparin   Injectable 4000 Unit(s) IV Push every 6 hours PRN For aPTT less than 40  melatonin 3 milliGRAM(s) Oral at bedtime PRN Insomnia    Vital Signs Last 24 Hrs  T(C): 36.7 (28 Dec 2024 08:57), Max: 36.9 (27 Dec 2024 23:10)  T(F): 98.1 (28 Dec 2024 08:57), Max: 98.4 (27 Dec 2024 23:10)  HR: 82 (28 Dec 2024 11:02) (69 - 86)  BP: 102/68 (28 Dec 2024 11:02) (72/59 - 114/65)  RR: 18 (28 Dec 2024 08:57) (18 - 18)  SpO2: 97% (28 Dec 2024 08:57) (97% - 98%)  Patient On (Oxygen Delivery Method): room air    PHYSICAL EXAM:  Constitutional: NAD, awake and alert  HEENT:  EOMI,  Pupils round, No oral cyanosis.  Pulmonary: Non-labored, breath sounds are clear bilaterally, No wheezing, rales or rhonchi  Cardiovascular: S1 and S2, regular rate and rhythm, no Murmurs, gallops or rubs  Gastrointestinal: Bowel Sounds present, soft, nontender.   Lymph: No peripheral edema. No cervical lymphadenopathy.  Neurological: Alert, no focal deficits  Skin: No rashes.  Psych:  Mood & affect appropriate    LABS:             12.2   5.46  )-----------( 266      ( 28 Dec 2024 07:26 )             36.0     134[L]  |  107  |  12  ----------------------------<  109[H]  3.4[L]   |  22  |  0.78    Ca    8.3[L]      27 Dec 2024 07:21  Mg     1.7     12-26    TPro  6.4  /  Alb  3.1[L]  /  TBili  0.7  /  DBili  x   /  AST  15  /  ALT  19  /  AlkPhos  71  12-27    TroponinI hsT: <-131.29, <-174.43, <-127.06, <-78.64, <-25.79           TTE W or WO Ultrasound Enhancing Agent (12.26.24 @ 15:32) >   1. Left ventricular cavity is small. Left ventricular systolic function is normal with an ejection fraction visually estimated at 55 to 60 %.   2. Normal right ventricular cavity size.   3. Left atrium is normal in size.   4. The right atrium is normal in size.   5. Mild mitral regurgitation.   6. Structurally normal pulmonic valve with normal leaflet excursion.   7. Structurally normal mitral valve with normal leaflet excursion.   8. Structurally normal tricuspid valve with normal leaflet excursion. Mild to moderate tricuspid regurgitation.   9. Mild pulmonic regurgitation.  10. Trileaflet aortic valve with normal systolic excursion.

## 2024-12-28 NOTE — PROGRESS NOTE ADULT - PROBLEM SELECTOR PLAN 2
·  Problem: Elevated troponin.   ·  Recommendation: mildly elevated in setting Afib wof ith RVR. trend not consistent with ACS   echo with preserved function and no WMA   will obtain nuclear stress test on Monday 12/30 given c/o chest pain  cont aspirin, would increase atorvastatin 40mg.

## 2024-12-29 LAB
APTT BLD: 59.6 SEC — HIGH (ref 24.5–35.6)
APTT BLD: 63 SEC — HIGH (ref 24.5–35.6)
APTT BLD: 76.1 SEC — HIGH (ref 24.5–35.6)
HCT VFR BLD CALC: 34.9 % — SIGNIFICANT CHANGE UP (ref 34.5–45)
HGB BLD-MCNC: 11.9 G/DL — SIGNIFICANT CHANGE UP (ref 11.5–15.5)
MCHC RBC-ENTMCNC: 29.6 PG — SIGNIFICANT CHANGE UP (ref 27–34)
MCHC RBC-ENTMCNC: 34.1 G/DL — SIGNIFICANT CHANGE UP (ref 32–36)
MCV RBC AUTO: 86.8 FL — SIGNIFICANT CHANGE UP (ref 80–100)
PLATELET # BLD AUTO: 285 K/UL — SIGNIFICANT CHANGE UP (ref 150–400)
RBC # BLD: 4.02 M/UL — SIGNIFICANT CHANGE UP (ref 3.8–5.2)
RBC # FLD: 13.5 % — SIGNIFICANT CHANGE UP (ref 10.3–14.5)
WBC # BLD: 4.43 K/UL — SIGNIFICANT CHANGE UP (ref 3.8–10.5)
WBC # FLD AUTO: 4.43 K/UL — SIGNIFICANT CHANGE UP (ref 3.8–10.5)

## 2024-12-29 PROCEDURE — 99233 SBSQ HOSP IP/OBS HIGH 50: CPT

## 2024-12-29 PROCEDURE — 99232 SBSQ HOSP IP/OBS MODERATE 35: CPT

## 2024-12-29 RX ORDER — NYSTATIN TOPICAL POWDER 100000 U/G
1 POWDER TOPICAL THREE TIMES A DAY
Refills: 0 | Status: DISCONTINUED | OUTPATIENT
Start: 2024-12-29 | End: 2024-12-30

## 2024-12-29 RX ORDER — ATORVASTATIN CALCIUM 40 MG/1
40 TABLET, FILM COATED ORAL AT BEDTIME
Refills: 0 | Status: DISCONTINUED | OUTPATIENT
Start: 2024-12-30 | End: 2024-12-30

## 2024-12-29 RX ADMIN — Medication 50 MILLIGRAM(S): at 09:27

## 2024-12-29 RX ADMIN — NYSTATIN TOPICAL POWDER 1 APPLICATION(S): 100000 POWDER TOPICAL at 17:15

## 2024-12-29 RX ADMIN — HEPARIN SODIUM 950 UNIT(S)/HR: 1000 INJECTION, SOLUTION INTRAVENOUS; SUBCUTANEOUS at 19:41

## 2024-12-29 RX ADMIN — NYSTATIN TOPICAL POWDER 1 APPLICATION(S): 100000 POWDER TOPICAL at 21:41

## 2024-12-29 RX ADMIN — HEPARIN SODIUM 950 UNIT(S)/HR: 1000 INJECTION, SOLUTION INTRAVENOUS; SUBCUTANEOUS at 17:12

## 2024-12-29 RX ADMIN — FENOFIBRATE 145 MILLIGRAM(S): 48 TABLET ORAL at 09:25

## 2024-12-29 RX ADMIN — HEPARIN SODIUM 1050 UNIT(S)/HR: 1000 INJECTION, SOLUTION INTRAVENOUS; SUBCUTANEOUS at 07:20

## 2024-12-29 RX ADMIN — DILTIAZEM HYDROCHLORIDE 120 MILLIGRAM(S): 300 CAPSULE, COATED, EXTENDED RELEASE ORAL at 09:25

## 2024-12-29 RX ADMIN — HEPARIN SODIUM 1050 UNIT(S)/HR: 1000 INJECTION, SOLUTION INTRAVENOUS; SUBCUTANEOUS at 01:29

## 2024-12-29 RX ADMIN — HEPARIN SODIUM 950 UNIT(S)/HR: 1000 INJECTION, SOLUTION INTRAVENOUS; SUBCUTANEOUS at 08:49

## 2024-12-29 RX ADMIN — Medication 81 MILLIGRAM(S): at 09:25

## 2024-12-29 RX ADMIN — DONEPEZIL HYDROCHLORIDE 5 MILLIGRAM(S): 5 TABLET, FILM COATED ORAL at 21:41

## 2024-12-29 NOTE — DISCHARGE NOTE PROVIDER - CARE PROVIDER_API CALL
Hema Redmond  Cardiovascular Disease  [address] [facility]  [place] [address]  Phone: [phone]  Fax: [phone]  Follow Up Time: 2 weeks

## 2024-12-29 NOTE — PROGRESS NOTE ADULT - PROBLEM SELECTOR PLAN 2
·  Problem: Elevated troponin.   ·  Recommendation: mildly elevated in setting of Efib with RVR. trend not consistent with ACS   echo with preserved function and no WMA   will obtain nuclear stress test on Monday 12/30 given c/o chest pain, npo p midnight   cont aspirin, would increase atorvastatin 40mg. ·  Problem: Elevated troponin.   ·  Recommendation: mildly elevated in setting of Afib with RVR. trend not consistent with ACS   echo with preserved function and no WMA   will obtain nuclear stress test on Monday 12/30 given c/o chest pain, npo p midnight   cont aspirin, would increase atorvastatin 40mg.

## 2024-12-29 NOTE — DISCHARGE NOTE PROVIDER - HOSPITAL COURSE
#Discharge: do not delete    74-yo non compliant F with PMH includes HLD, HTN, A-Fib on Eliquis came in from home s/p near-syncopal episode associated with chest discomfort & palpitations.  Patient reports this a.m. while showering felt acute onset severe lightheadedness /near/syncopal with mild mid chest pressure discomfort & rapid palpitations.      T(C): 36.8 (12-29-24 @ 08:29), Max: 36.8 (12-28-24 @ 23:26)  HR: 85 (12-29-24 @ 08:29) (84 - 85)  BP: 115/84 (12-29-24 @ 08:29) (105/73 - 121/82)  RR: 18 (12-29-24 @ 08:29) (18 - 18)  SpO2: 98% (12-29-24 @ 08:29) (96% - 98%)    Hospital course (by problem):     # chest pain -- resolved   # A fib with RVR , suspected related to medication non compliance   # Elevated troponin   -  no palpitation or chest pain Episode but near syncope at home   - s/p Cardizem 120mg x1 then 10mg x1 in ER   - EKG a fib with RVR  143 bpm Qtc 456ms   - trop initially rising 27 > 171 now downtrending to 130  -  Pro BNP 1027  - CXR  negative  - HR controlled since last night   - TTE 2022 Normal left ventricular internal dimensions and systolic function,  LVEF of 60%, TTE 2024: EF 55-60%  - Continue home Cardizem, BB  - continue Eliquis BID  - cardio consulted, follow recs > stress test tomorrow, npo@[place]    # HLD  - Continue statin and fibrates       Physical exam at the time of discharge:   General: NAD, sitting comfortably in bed   HEENT: PERRL/ EOMI, no scleral icterus, MMM  Respiratory: lungs CTA b/l, no wheezes/crackles, no accessory muscle use  Cardiovascular: Regular rhythm/rate; +S1 +S2  Gastrointestinal: Soft, NTND  Extremities: WWP, no cyanosis, no edema, pulses equal  Neurological: A&Ox3, no gross focal deficits, follows commands  Skin: Normal temperature, warm, dry #Discharge: do not delete    74-yo non compliant F with PMH includes HLD, HTN, A-Fib on Eliquis came in from home s/p near-syncopal episode associated with chest discomfort & palpitations.  Patient reports this a.m. while showering felt acute onset severe lightheadedness /near/syncopal with mild mid chest pressure discomfort & rapid palpitations.      T(C): 36.8 (12-29-24 @ 08:29), Max: 36.8 (12-28-24 @ 23:26)  HR: 85 (12-29-24 @ 08:29) (84 - 85)  BP: 115/84 (12-29-24 @ 08:29) (105/73 - 121/82)  RR: 18 (12-29-24 @ 08:29) (18 - 18)  SpO2: 98% (12-29-24 @ 08:29) (96% - 98%)    Hospital course (by problem):     # chest pain -- resolved   # A fib with RVR , suspected related to medication non compliance   # Elevated troponin   -  no palpitation or chest pain Episode but near syncope at home   - s/p Cardizem 120mg x1 then 10mg x1 in ER   - EKG a fib with RVR  143 bpm Qtc 456ms   - trop initially rising 27 > 171 now downtrending to 130  -  Pro BNP 1027  - CXR  negative  - HR controlled since last night   - TTE 2022 Normal left ventricular internal dimensions and systolic function,  LVEF of 60%, TTE 2024: EF 55-60%  - Continue home Cardizem, BB  - continue Eliquis BID  - cardio consulted, follow recs >  Stress electrocardiogram: No significant ischemic ST segment changes. Stress electrocardiogram: No ischemic ST segment changes.  - Pt would benefit from additional home health aid hours     # HLD  - Continue statin and fibrates       Physical exam at the time of discharge:   General: NAD, sitting comfortably in bed   HEENT: PERRL/ EOMI, no scleral icterus, MMM  Respiratory: lungs CTA b/l, no wheezes/crackles, no accessory muscle use  Cardiovascular: Regular rhythm/rate; +S1 +S2  Gastrointestinal: Soft, NTND  Extremities: WWP, no cyanosis, no edema, pulses equal  Neurological: A&Ox3, no gross focal deficits, follows commands  Skin: Normal temperature, warm, dry

## 2024-12-29 NOTE — PROGRESS NOTE ADULT - SUBJECTIVE AND OBJECTIVE BOX
REASON FOR VISIT: AF    HPI:  Ms. Gannon is a 75 yo female with a hx Afib on eliquis, HTN, HLD presenting after feeling dizzy/lightheaded. Also experienced palpitations and intermittent chest discomfort.     Pt is a poor historian- unable to provide details of cardiac hx- aware she is on a "blood thinner" but unable to provide information on diagnosis of Afib. Per family, she had not taken medications for the past week.   Chest discomfort is somewhat reproducible on exam.     In the ED, labs notable for Hs trop 78, 127, 174 (peak), 131.   ECG Afib with RVR at 143 bpm        TTE 2022 Normal left ventricular internal dimensions and systolic function,   estimated LVEF of 60%.  Grossly normal RV size and systolic function.  Normal trileaflet aortic valve, without AI.  Trace physiologic MR  Mild TR.  No significant pericardial effusion.      s/p cardizem 120mg x1 then 10mg x1 (26 Dec 2024 14:49)    12/28/24 - no cardiac complaints, Tele with Afib rate controlled  12/24/29 - seen in bed, for pharm stress test tomorrow, Tele: Afib 70-80       Home Medications:  apixaban 5 mg oral tablet: 1 tab(s) orally every 12 hours (26 Dec 2024 14:46)  aspirin 81 mg oral delayed release tablet: 1 tab(s) orally once a day (in the evening) (26 Dec 2024 14:46)  dilTIAZem 120 mg/24 hours oral capsule, extended release: 1 cap(s) orally once a day (26 Dec 2024 14:46)  donepezil 5 mg oral tablet: 1 tab(s) orally once a day (in the morning) (26 Dec 2024 14:46)  fenofibrate 160 mg oral tablet: 1 tab(s) orally once a day (26 Dec 2024 14:46)  metoprolol succinate 50 mg oral tablet, extended release: 1 tab(s) orally once a day (26 Dec 2024 14:46)  simvastatin 20 mg oral tablet: 1 tab(s) orally once a day (at bedtime) (26 Dec 2024 14:46)    MEDICATIONS  (STANDING):  aspirin enteric coated 81 milliGRAM(s) Oral daily  atorvastatin 10 milliGRAM(s) Oral at bedtime  diltiazem    milliGRAM(s) Oral daily  donepezil 5 milliGRAM(s) Oral at bedtime  fenofibrate Tablet 145 milliGRAM(s) Oral daily  heparin  Infusion.  Unit(s)/Hr (9.5 mL/Hr) IV Continuous <Continuous>  metoprolol succinate ER 50 milliGRAM(s) Oral daily    MEDICATIONS  (PRN):  heparin   Injectable 4000 Unit(s) IV Push every 6 hours PRN For aPTT less than 40  melatonin 3 milliGRAM(s) Oral at bedtime PRN Insomnia    Vital Signs Last 24 Hrs  T(C): 36.8 (29 Dec 2024 08:29), Max: 36.8 (28 Dec 2024 23:26)  T(F): 98.2 (29 Dec 2024 08:29), Max: 98.2 (28 Dec 2024 23:26)  HR: 85 (29 Dec 2024 08:29) (82 - 85)  BP: 115/84 (29 Dec 2024 08:29) (102/68 - 121/82)  BP(mean): --  RR: 18 (29 Dec 2024 08:29) (18 - 18)  SpO2: 98% (29 Dec 2024 08:29) (96% - 98%)    Parameters below as of 29 Dec 2024 08:29  Patient On (Oxygen Delivery Method): room air      PHYSICAL EXAM:  Constitutional: NAD, awake and alert  HEENT:  EOMI,  Pupils round, No oral cyanosis.  Pulmonary: Non-labored, breath sounds are clear bilaterally, No wheezing, rales or rhonchi  Cardiovascular: S1 and S2, regular rate and rhythm, no Murmurs, gallops or rubs  Gastrointestinal: Bowel Sounds present, soft, nontender.   Lymph: No peripheral edema. No cervical lymphadenopathy.  Neurological: Alert, no focal deficits  Skin: No rashes.  Psych:  Mood & affect appropriate    LABS: reviewed                          11.9   4.43  )-----------( 285      ( 29 Dec 2024 07:33 )             34.9     - TroponinI hsT: <-131.29, <-174.43, <-127.06, <-78.64, <-25.79    Radiology/EKG/TTE: reviewed         TTE W or WO Ultrasound Enhancing Agent (12.26.24 @ 15:32) >   1. Left ventricular cavity is small. Left ventricular systolic function is normal with an ejection fraction visually estimated at 55 to 60 %.   2. Normal right ventricular cavity size.   3. Left atrium is normal in size.   4. The right atrium is normal in size.   5. Mild mitral regurgitation.   6. Structurally normal pulmonic valve with normal leaflet excursion.   7. Structurally normal mitral valve with normal leaflet excursion.   8. Structurally normal tricuspid valve with normal leaflet excursion. Mild to moderate tricuspid regurgitation.   9. Mild pulmonic regurgitation.  10. Trileaflet aortic valve with normal systolic excursion.       REASON FOR VISIT: AF    HPI:  Ms. Gannon is a 73 yo female with a hx Afib on eliquis, HTN, HLD presenting after feeling dizzy/lightheaded. Also experienced palpitations and intermittent chest discomfort.     Pt is a poor historian- unable to provide details of cardiac hx- aware she is on a "blood thinner" but unable to provide information on diagnosis of Afib. Per family, she had not taken medications for the past week.   Chest discomfort is somewhat reproducible on exam.     In the ED, labs notable for Hs trop 78, 127, 174 (peak), 131.   ECG Afib with RVR at 143 bpm        TTE 2022 Normal left ventricular internal dimensions and systolic function,   estimated LVEF of 60%.  Grossly normal RV size and systolic function.  Normal trileaflet aortic valve, without AI.  Trace physiologic MR  Mild TR.  No significant pericardial effusion.      s/p cardizem 120mg x1 then 10mg x1 (26 Dec 2024 14:49)    12/28/24 - no cardiac complaints, Tele with Afib rate controlled  12/24/29 - seen in bed, for pharm stress test tomorrow, Tele: Afib 70-80       Home Medications:  apixaban 5 mg oral tablet: 1 tab(s) orally every 12 hours (26 Dec 2024 14:46)  aspirin 81 mg oral delayed release tablet: 1 tab(s) orally once a day (in the evening) (26 Dec 2024 14:46)  dilTIAZem 120 mg/24 hours oral capsule, extended release: 1 cap(s) orally once a day (26 Dec 2024 14:46)  donepezil 5 mg oral tablet: 1 tab(s) orally once a day (in the morning) (26 Dec 2024 14:46)  fenofibrate 160 mg oral tablet: 1 tab(s) orally once a day (26 Dec 2024 14:46)  metoprolol succinate 50 mg oral tablet, extended release: 1 tab(s) orally once a day (26 Dec 2024 14:46)  simvastatin 20 mg oral tablet: 1 tab(s) orally once a day (at bedtime) (26 Dec 2024 14:46)    MEDICATIONS  (STANDING):  aspirin enteric coated 81 milliGRAM(s) Oral daily  atorvastatin 10 milliGRAM(s) Oral at bedtime  diltiazem    milliGRAM(s) Oral daily  donepezil 5 milliGRAM(s) Oral at bedtime  fenofibrate Tablet 145 milliGRAM(s) Oral daily  heparin  Infusion.  Unit(s)/Hr (9.5 mL/Hr) IV Continuous <Continuous>  metoprolol succinate ER 50 milliGRAM(s) Oral daily    MEDICATIONS  (PRN):  heparin   Injectable 4000 Unit(s) IV Push every 6 hours PRN For aPTT less than 40  melatonin 3 milliGRAM(s) Oral at bedtime PRN Insomnia    Vital Signs Last 24 Hrs  T(C): 36.8 (29 Dec 2024 08:29), Max: 36.8 (28 Dec 2024 23:26)  T(F): 98.2 (29 Dec 2024 08:29), Max: 98.2 (28 Dec 2024 23:26)  HR: 85 (29 Dec 2024 08:29) (82 - 85)  BP: 115/84 (29 Dec 2024 08:29) (102/68 - 121/82)  RR: 18 (29 Dec 2024 08:29) (18 - 18)  SpO2: 98% (29 Dec 2024 08:29) (96% - 98%)  Patient On (Oxygen Delivery Method): room air    PHYSICAL EXAM:  Constitutional: NAD, awake and alert  HEENT:  EOMI,  Pupils round, No oral cyanosis.  Pulmonary: Non-labored, breath sounds are clear bilaterally, No wheezing, rales or rhonchi  Cardiovascular: S1 and S2, regular rate and rhythm, no Murmurs, gallops or rubs  Gastrointestinal: Bowel Sounds present, soft, nontender.   Lymph: No peripheral edema. No cervical lymphadenopathy.  Neurological: Alert, no focal deficits  Skin: No rashes.  Psych:  Mood & affect appropriate    LABS: reviewed                          11.9   4.43  )-----------( 285      ( 29 Dec 2024 07:33 )             34.9     - TroponinI hsT: <-131.29, <-174.43, <-127.06, <-78.64, <-25.79    Radiology/EKG/TTE: reviewed         TTE W or WO Ultrasound Enhancing Agent (12.26.24 @ 15:32) >   1. Left ventricular cavity is small. Left ventricular systolic function is normal with an ejection fraction visually estimated at 55 to 60 %.   2. Normal right ventricular cavity size.   3. Left atrium is normal in size.   4. The right atrium is normal in size.   5. Mild mitral regurgitation.   6. Structurally normal pulmonic valve with normal leaflet excursion.   7. Structurally normal mitral valve with normal leaflet excursion.   8. Structurally normal tricuspid valve with normal leaflet excursion. Mild to moderate tricuspid regurgitation.   9. Mild pulmonic regurgitation.  10. Trileaflet aortic valve with normal systolic excursion.

## 2024-12-29 NOTE — DISCHARGE NOTE PROVIDER - NSDCFUSCHEDAPPT_GEN_ALL_CORE_FT
[facility]  ORTHOSURG [address] [facility]  Scheduled Appointment: 01/24/2025     [facility]  ORTHOSURG [address] [facility]  Scheduled Appointment: 01/21/2025

## 2024-12-29 NOTE — DISCHARGE NOTE PROVIDER - NSDCCPCAREPLAN_GEN_ALL_CORE_FT
PRINCIPAL DISCHARGE DIAGNOSIS  Diagnosis: Near syncope  Assessment and Plan of Treatment: You were previously diagnosed with atrial fibrillation. This is an abnormal heart beat that, if left uncontrolled, can cause symptoms like chest pain and shortness of breath. Please follow up with your primary care physician for further management of your atrial fibrillation.  ###  What is Chest Pain? Chest pain is any discomfort or pain that you feel in your chest area. It can feel like a sharp stab, a dull ache, or a squeezing sensation. The pain might be constant or come and go.  Possible Causes:  Heart-Related Causes:  Angina: This occurs when your heart doesn’t get enough oxygen-rich blood. It might feel like pressure or squeezing in your chest and can happen with physical activity or stress.  Heart Attack (Myocardial Infarction): This happens when a part of your heart muscle doesn't get enough blood and starts to die. It can cause intense chest pain, often with other symptoms like shortness of breath, nausea, or sweating.  Non-Heart-Related Causes:  Gastroesophageal Reflux Disease (GERD): Acid from your stomach can irritate your esophagus and cause chest pain.  Muscle Strain: Overuse or injury to chest muscles can cause pain, often worse with movement or touch.  Panic Attacks: Intense anxiety can cause chest pain, often accompanied by rapid heartbeat, sweating, and feelings of dread.  When to Seek Help: If you experience chest pain, especially if it’s sudden, severe, or accompanied by other symptoms like shortness of breath, nausea, or pain radiating to your arm or jaw, you should seek medical attention immediately. It’s better to get checked out, even if it turns out to be something less serious.  How It’s Diagnosed: Your doctor might ask about your symptoms, do a physical exam, and possibly run tests like an EKG, blood tests, or imaging studies to figure out the cause of your chest pain.  Treatment: Treatment depends on the cause. For heart-related issues, it might involve medication, lifestyle changes, or procedures. For other causes, treatment might focus on ma      SECONDARY DISCHARGE DIAGNOSES  Diagnosis: Chronic atrial fibrillation with rapid ventricular response  Assessment and Plan of Treatment:     Diagnosis: Chest pain with high risk of acute coronary syndrome  Assessment and Plan of Treatment:

## 2024-12-29 NOTE — PROGRESS NOTE ADULT - PROBLEM SELECTOR PLAN 1
·  Problem: Atrial fibrillation.   ·  Recommendation: pt presenting with dizziness found to be in rapid Afib - in setting medical non-adherence   rate now improved cont cardizem and toprol   on heparin gtt in place of eliquis   favor rate control strategy due to med non-adherence.

## 2024-12-29 NOTE — PROGRESS NOTE ADULT - SUBJECTIVE AND OBJECTIVE BOX
CHIEF COMPLAINT:    SUBJECTIVE/SIGNIFICANT INTERVAL EVENTS/OVERNIGHT EVENTS:    Review of Systems: 14 Point review of systems reviewed and reported as negative unless otherwise stated in HPI    FROM H&P:  74-yo non compliant F with PMH includes HLD, HTN, A-Fib on Eliquis came in from home s/p near-syncopal episode associated with chest discomfort & palpitations.  Patient reports this a.m. while showering felt acute onset severe lightheadedness/near/syncopal with mild mid chest pressure discomfort & rapid palpitations.  No LOC incurred, patient quickly rested upon the bed and called 911.  Patient reports intermittent mid-chest pressure discomfort since yesterday, 6-7 episodes altogether, each episode 30 minutes or less duration mild to moderate severity.  Patient currently denies chest pain, no palpitation   Patient has aide at home, comes for 4-6 hrs.  Per family  pt is not taking medications for past 1 week. per patient she just stop taking everything   MOLST filled at bedside DNR /DNI w/ trial of NIV  status.     12/27: pt feels better. admits to not taking meds for 1+ week  12/28: no acute events   12/29: no acute events, stress test tomorrow     PHYSICAL EXAM:    T(C): 36.7 (12-27-24 @ 08:49), Max: 37 (12-26-24 @ 15:30)  HR: 96 (12-27-24 @ 08:49) (90 - 149)  BP: 72/59 (12-27-24 @ 12:10) (72/59 - 122/86)  RR: 18 (12-27-24 @ 08:49) (14 - 23)  SpO2: 96% (12-27-24 @ 08:49) (92% - 100%)    General: AAOx3; NAD  Head: AT/NC  ENT: Moist Mucous Membranes; No Injury  Eyes: EOMI;   Neck: Non-tender; No JVD  CVS: RRR, S1&S2, No murmur, No edema  Respiratory: Lungs CTA B/L; Normal Respiratory Effort  Abdomen/GI: Soft, non-tender, non-distended  Extremities: No cyanosis, No clubbing, No edema  Neuro: AAOx3  Psych: Appropriate, Cooperative, No depression, No anxiety  Skin: Clean, Dry and Intact      LABS:                          13.1   6.12  )-----------( 256      ( 27 Dec 2024 09:17 )             38.1     12-27    134[L]  |  107  |  12  ----------------------------<  109[H]  3.4[L]   |  22  |  0.78    Ca    8.3[L]      27 Dec 2024 07:21  Mg     1.7     12-26    TPro  6.4  /  Alb  3.1[L]  /  TBili  0.7  /  DBili  x   /  AST  15  /  ALT  19  /  AlkPhos  71  12-27      CAPILLARY BLOOD GLUCOSE              RADIOLOGY:      EKG:      ECHO:      PROCEDURES:        I personally reviewed labs, imaging, ekg, orders and vitals.    Discussed case with:  []RN  []CM/SW  []Patient  []Family  []Specialist:        MEDICATIONS  (STANDING):  aspirin enteric coated 81 milliGRAM(s) Oral daily  atorvastatin 10 milliGRAM(s) Oral at bedtime  diltiazem    milliGRAM(s) Oral daily  donepezil 5 milliGRAM(s) Oral at bedtime  fenofibrate Tablet 145 milliGRAM(s) Oral daily  heparin  Infusion.  Unit(s)/Hr (9.5 mL/Hr) IV Continuous <Continuous>  metoprolol succinate ER 50 milliGRAM(s) Oral daily    MEDICATIONS  (PRN):  heparin   Injectable 4000 Unit(s) IV Push every 6 hours PRN For aPTT less than 40  melatonin 3 milliGRAM(s) Oral at bedtime PRN Insomnia

## 2024-12-29 NOTE — DISCHARGE NOTE PROVIDER - NSDCMRMEDTOKEN_GEN_ALL_CORE_FT
apixaban 5 mg oral tablet: 1 tab(s) orally every 12 hours  aspirin 81 mg oral delayed release tablet: 1 tab(s) orally once a day (in the evening)  dilTIAZem 120 mg/24 hours oral capsule, extended release: 1 cap(s) orally once a day  donepezil 5 mg oral tablet: 1 tab(s) orally once a day (in the morning)  fenofibrate 160 mg oral tablet: 1 tab(s) orally once a day  metoprolol succinate 50 mg oral tablet, extended release: 1 tab(s) orally once a day  simvastatin 20 mg oral tablet: 1 tab(s) orally once a day (at bedtime)   apixaban 5 mg oral tablet: 1 tab(s) orally every 12 hours  aspirin 81 mg oral delayed release tablet: 1 tab(s) orally once a day (in the evening)  dilTIAZem 120 mg/24 hours oral capsule, extended release: 1 cap(s) orally once a day  donepezil 5 mg oral tablet: 1 tab(s) orally once a day (in the morning)  fenofibrate 160 mg oral tablet: 1 tab(s) orally once a day  metoprolol succinate 50 mg oral tablet, extended release: 1 tab(s) orally once a day  nystatin 100,000 units/g topical powder: Apply topically to affected area 3 times a day to affected area/under breasts  simvastatin 20 mg oral tablet: 1 tab(s) orally once a day (at bedtime)

## 2024-12-29 NOTE — DISCHARGE NOTE PROVIDER - NSDCFUADDAPPT_GEN_ALL_CORE_FT
APPTS ARE READY TO BE MADE: [X] YES    Best Family or Patient Contact (if needed):    Additional Information about above appointments (if needed):    1:  2:  3:  APPTS ARE READY TO BE MADE: [X] YES    Best Family or Patient Contact (if needed):    Additional Information about above appointments (if needed):    1:  2:  3:    Prior to outreaching the patient, it was visible that the patient has secured a follow up appointment which was not scheduled by our team. Patient is scheduled to see Dr. Dasilva on 1/7 at [address] [place] [address]

## 2024-12-30 ENCOUNTER — TRANSCRIPTION ENCOUNTER (OUTPATIENT)
Age: 74
End: 2024-12-30

## 2024-12-30 ENCOUNTER — RESULT REVIEW (OUTPATIENT)
Age: 74
End: 2024-12-30

## 2024-12-30 VITALS
HEART RATE: 81 BPM | TEMPERATURE: 98 F | DIASTOLIC BLOOD PRESSURE: 52 MMHG | SYSTOLIC BLOOD PRESSURE: 113 MMHG | RESPIRATION RATE: 18 BRPM | OXYGEN SATURATION: 97 %

## 2024-12-30 LAB
APTT BLD: 51.4 SEC — HIGH (ref 24.5–35.6)
APTT BLD: 57.5 SEC — HIGH (ref 24.5–35.6)
APTT BLD: 61 SEC — HIGH (ref 24.5–35.6)
HCT VFR BLD CALC: 35.7 % — SIGNIFICANT CHANGE UP (ref 34.5–45)
HGB BLD-MCNC: 11.9 G/DL — SIGNIFICANT CHANGE UP (ref 11.5–15.5)
MCHC RBC-ENTMCNC: 29.1 PG — SIGNIFICANT CHANGE UP (ref 27–34)
MCHC RBC-ENTMCNC: 33.3 G/DL — SIGNIFICANT CHANGE UP (ref 32–36)
MCV RBC AUTO: 87.3 FL — SIGNIFICANT CHANGE UP (ref 80–100)
PLATELET # BLD AUTO: 265 K/UL — SIGNIFICANT CHANGE UP (ref 150–400)
RBC # BLD: 4.09 M/UL — SIGNIFICANT CHANGE UP (ref 3.8–5.2)
RBC # FLD: 13.4 % — SIGNIFICANT CHANGE UP (ref 10.3–14.5)
WBC # BLD: 5.76 K/UL — SIGNIFICANT CHANGE UP (ref 3.8–10.5)
WBC # FLD AUTO: 5.76 K/UL — SIGNIFICANT CHANGE UP (ref 3.8–10.5)

## 2024-12-30 PROCEDURE — 99232 SBSQ HOSP IP/OBS MODERATE 35: CPT | Mod: 25

## 2024-12-30 PROCEDURE — 99239 HOSP IP/OBS DSCHRG MGMT >30: CPT

## 2024-12-30 PROCEDURE — 78452 HT MUSCLE IMAGE SPECT MULT: CPT | Mod: 26

## 2024-12-30 PROCEDURE — 93018 CV STRESS TEST I&R ONLY: CPT

## 2024-12-30 PROCEDURE — 93016 CV STRESS TEST SUPVJ ONLY: CPT

## 2024-12-30 RX ORDER — SODIUM CHLORIDE 9 MG/ML
100 INJECTION, SOLUTION INTRAMUSCULAR; INTRAVENOUS; SUBCUTANEOUS ONCE
Refills: 0 | Status: DISCONTINUED | OUTPATIENT
Start: 2024-12-30 | End: 2024-12-30

## 2024-12-30 RX ORDER — METOPROLOL TARTRATE 50 MG
1 TABLET ORAL
Qty: 30 | Refills: 1
Start: 2024-12-30 | End: 2025-02-27

## 2024-12-30 RX ORDER — SIMVASTATIN 5 MG/1
1 TABLET, FILM COATED ORAL
Qty: 30 | Refills: 1
Start: 2024-12-30 | End: 2025-02-27

## 2024-12-30 RX ORDER — NYSTATIN TOPICAL POWDER 100000 U/G
1 POWDER TOPICAL
Qty: 1 | Refills: 0
Start: 2024-12-30 | End: 2025-01-05

## 2024-12-30 RX ORDER — APIXABAN 5 MG/1
1 TABLET, FILM COATED ORAL
Qty: 60 | Refills: 1
Start: 2024-12-30 | End: 2025-02-27

## 2024-12-30 RX ORDER — LOPERAMIDE HCL 1 MG/5 ML
2 LIQUID (ML) ORAL ONCE
Refills: 0 | Status: COMPLETED | OUTPATIENT
Start: 2024-12-30 | End: 2024-12-30

## 2024-12-30 RX ORDER — ACETAMINOPHEN 80 MG/.8ML
650 SOLUTION/ DROPS ORAL ONCE
Refills: 0 | Status: COMPLETED | OUTPATIENT
Start: 2024-12-30 | End: 2024-12-30

## 2024-12-30 RX ORDER — ASPIRIN 81 MG
1 TABLET, DELAYED RELEASE (ENTERIC COATED) ORAL
Qty: 30 | Refills: 1
Start: 2024-12-30 | End: 2025-02-27

## 2024-12-30 RX ORDER — FENOFIBRATE 48 MG/1
1 TABLET ORAL
Qty: 30 | Refills: 1
Start: 2024-12-30 | End: 2025-02-27

## 2024-12-30 RX ORDER — DILTIAZEM HYDROCHLORIDE 300 MG/1
1 CAPSULE, COATED, EXTENDED RELEASE ORAL
Qty: 30 | Refills: 1
Start: 2024-12-30 | End: 2025-02-27

## 2024-12-30 RX ORDER — FENOFIBRATE 48 MG/1
1 TABLET ORAL
Refills: 0 | DISCHARGE

## 2024-12-30 RX ADMIN — HEPARIN SODIUM 1100 UNIT(S)/HR: 1000 INJECTION, SOLUTION INTRAVENOUS; SUBCUTANEOUS at 06:57

## 2024-12-30 RX ADMIN — ACETAMINOPHEN 650 MILLIGRAM(S): 80 SOLUTION/ DROPS ORAL at 12:00

## 2024-12-30 RX ADMIN — Medication 2 MILLIGRAM(S): at 03:14

## 2024-12-30 RX ADMIN — NYSTATIN TOPICAL POWDER 1 APPLICATION(S): 100000 POWDER TOPICAL at 05:53

## 2024-12-30 RX ADMIN — ACETAMINOPHEN 650 MILLIGRAM(S): 80 SOLUTION/ DROPS ORAL at 03:41

## 2024-12-30 RX ADMIN — NYSTATIN TOPICAL POWDER 1 APPLICATION(S): 100000 POWDER TOPICAL at 13:33

## 2024-12-30 RX ADMIN — HEPARIN SODIUM 1100 UNIT(S)/HR: 1000 INJECTION, SOLUTION INTRAVENOUS; SUBCUTANEOUS at 00:12

## 2024-12-30 RX ADMIN — HEPARIN SODIUM 1100 UNIT(S)/HR: 1000 INJECTION, SOLUTION INTRAVENOUS; SUBCUTANEOUS at 07:39

## 2024-12-30 RX ADMIN — ACETAMINOPHEN 650 MILLIGRAM(S): 80 SOLUTION/ DROPS ORAL at 11:45

## 2024-12-30 NOTE — DISCHARGE NOTE NURSING/CASE MANAGEMENT/SOCIAL WORK - PATIENT PORTAL LINK FT
You can access the FollowMyHealth Patient Portal offered by [facility] by registering at the following website: [email or web address]. By joining [facility]’s FollowMyHealth portal, you will also be able to view your health information using other applications (apps) compatible with our system.

## 2024-12-30 NOTE — PROGRESS NOTE ADULT - SUBJECTIVE AND OBJECTIVE BOX
Interval history:  Reports no active symptoms.     Syncope and collapse    No pertinent family history in first degree relatives    FHx: heart disease    Handoff    MEWS Score    HLD (hyperlipidemia)    HLD (hyperlipidemia)    HLD (hyperlipidemia)    Near syncope    Atrial fibrillation    Elevated troponin    Chest pain    No significant past surgical history    No significant past surgical history    CHEST PAIN    90+    Chronic atrial fibrillation with rapid ventricular response    Chest pain with high risk of acute coronary syndrome    [email or web address]          acetaminophen     Tablet ..   650 milliGRAM(s) Oral (24 @ 03:41)    acetaminophen     Tablet ..   650 milliGRAM(s) Oral (24 @ 11:45)    aspirin enteric coated   81 milliGRAM(s) Oral (24 @ 09:25)    atorvastatin   10 milliGRAM(s) Oral (24 @ 21:40)    diltiazem   CD   120 milliGRAM(s) Oral (24 @ 09:25)    donepezil   5 milliGRAM(s) Oral (24 @ 21:41)   5 milliGRAM(s) Oral (24 @ 21:40)    fenofibrate Tablet   145 milliGRAM(s) Oral (24 @ 09:25)    heparin  Infusion.   1100 Unit(s)/Hr IV Continuous (24 @ 06:58)   1100 Unit(s)/Hr IV Continuous (24 @ 00:13)   1100 Unit(s)/Hr IV Continuous (24 @ 19:42)   950 Unit(s)/Hr IV Continuous (24 @ 17:13)   950 Unit(s)/Hr IV Continuous (24 @ 08:50)   950 Unit(s)/Hr IV Continuous (24 @ 07:21)   1050 Unit(s)/Hr IV Continuous (24 @ 01:30)   1050 Unit(s)/Hr IV Continuous (24 @ 19:29)   1050 Unit(s)/Hr IV Continuous (24 @ 18:36)    loperamide   2 milliGRAM(s) Oral (24 @ 03:14)    metoprolol succinate ER   50 milliGRAM(s) Oral (24 @ 09:27)   50 milliGRAM(s) Oral (24 @ 17:51)    nystatin Powder   1 Application(s) Topical (24 @ 13:33)   1 Application(s) Topical (24 @ 05:53)   1 Application(s) Topical (24 @ 21:41)   1 Application(s) Topical (24 @ 17:15)        aspirin enteric coated 81 milliGRAM(s) Oral daily  atorvastatin 40 milliGRAM(s) Oral at bedtime  diltiazem    milliGRAM(s) Oral daily  donepezil 5 milliGRAM(s) Oral at bedtime  fenofibrate Tablet 145 milliGRAM(s) Oral daily  heparin   Injectable 4000 Unit(s) IV Push every 6 hours PRN  heparin  Infusion.  Unit(s)/Hr IV Continuous <Continuous>  melatonin 3 milliGRAM(s) Oral at bedtime PRN  metoprolol succinate ER 50 milliGRAM(s) Oral daily  nystatin Powder 1 Application(s) Topical three times a day      T(C): 36.8 (24 @ 09:08), Max: 36.8 (24 @ 09:08)  HR: 66 (24 @ 09:08) (65 - 67)  BP: 99/50 (24 @ 09:08) (99/50 - 139/72)  RR: 18 (24 @ 09:08) (18 - 18)  SpO2: 97% (24 @ 09:08) (97% - 100%)    Weight (kg): 77.6 (24 @ 01:35)  Daily     Daily Weight in k.3 (30 Dec 2024 05:47)    Gen: no distress  CV: normal S1 and S2  Resp: Lungs CTA                                  11.9   5.76  )-----------( 265      ( 30 Dec 2024 06:09 )             35.7

## 2024-12-30 NOTE — PROGRESS NOTE ADULT - REASON FOR ADMISSION
a fib with RVR , chest pain

## 2024-12-30 NOTE — PROGRESS NOTE ADULT - SUBJECTIVE AND OBJECTIVE BOX
CHIEF COMPLAINT:    SUBJECTIVE/SIGNIFICANT INTERVAL EVENTS/OVERNIGHT EVENTS:    Review of Systems: 14 Point review of systems reviewed and reported as negative unless otherwise stated in HPI    FROM H&P:  74-yo non compliant F with PMH includes HLD, HTN, A-Fib on Eliquis came in from home s/p near-syncopal episode associated with chest discomfort & palpitations.  Patient reports this a.m. while showering felt acute onset severe lightheadedness/near/syncopal with mild mid chest pressure discomfort & rapid palpitations.  No LOC incurred, patient quickly rested upon the bed and called 911.  Patient reports intermittent mid-chest pressure discomfort since yesterday, 6-7 episodes altogether, each episode 30 minutes or less duration mild to moderate severity.  Patient currently denies chest pain, no palpitation   Patient has aide at home, comes for 4-6 hrs.  Per family  pt is not taking medications for past 1 week. per patient she just stop taking everything   MOLST filled at bedside DNR /DNI w/ trial of NIV  status.     12/27: pt feels better. admits to not taking meds for 1+ week  12/28: no acute events   12/29: no acute events, stress test tomorrow   12/30: stress test today    PHYSICAL EXAM:    T(C): 36.7 (12-27-24 @ 08:49), Max: 37 (12-26-24 @ 15:30)  HR: 96 (12-27-24 @ 08:49) (90 - 149)  BP: 72/59 (12-27-24 @ 12:10) (72/59 - 122/86)  RR: 18 (12-27-24 @ 08:49) (14 - 23)  SpO2: 96% (12-27-24 @ 08:49) (92% - 100%)    General: AAOx3; NAD  Head: AT/NC  ENT: Moist Mucous Membranes; No Injury  Eyes: EOMI;   Neck: Non-tender; No JVD  CVS: RRR, S1&S2, No murmur, No edema  Respiratory: Lungs CTA B/L; Normal Respiratory Effort  Abdomen/GI: Soft, non-tender, non-distended  Extremities: No cyanosis, No clubbing, No edema  Neuro: AAOx3  Psych: Appropriate, Cooperative, No depression, No anxiety  Skin: Clean, Dry and Intact      LABS:                          13.1   6.12  )-----------( 256      ( 27 Dec 2024 09:17 )             38.1     12-27    134[L]  |  107  |  12  ----------------------------<  109[H]  3.4[L]   |  22  |  0.78    Ca    8.3[L]      27 Dec 2024 07:21  Mg     1.7     12-26    TPro  6.4  /  Alb  3.1[L]  /  TBili  0.7  /  DBili  x   /  AST  15  /  ALT  19  /  AlkPhos  71  12-27      CAPILLARY BLOOD GLUCOSE              RADIOLOGY:      EKG:      ECHO:      PROCEDURES:        I personally reviewed labs, imaging, ekg, orders and vitals.    Discussed case with:  []RN  []CM/SW  []Patient  []Family  []Specialist:        MEDICATIONS  (STANDING):  aspirin enteric coated 81 milliGRAM(s) Oral daily  atorvastatin 10 milliGRAM(s) Oral at bedtime  diltiazem    milliGRAM(s) Oral daily  donepezil 5 milliGRAM(s) Oral at bedtime  fenofibrate Tablet 145 milliGRAM(s) Oral daily  heparin  Infusion.  Unit(s)/Hr (9.5 mL/Hr) IV Continuous <Continuous>  metoprolol succinate ER 50 milliGRAM(s) Oral daily    MEDICATIONS  (PRN):  heparin   Injectable 4000 Unit(s) IV Push every 6 hours PRN For aPTT less than 40  melatonin 3 milliGRAM(s) Oral at bedtime PRN Insomnia

## 2024-12-30 NOTE — PROGRESS NOTE ADULT - PROBLEM SELECTOR PLAN 3
Atypical. No recurrence.
·  Problem: Chest pain.   ·  Recommendation: as above- atypical and somewhat reproducible on exam   will plan for Nuc stress Monday.
·  Problem: Chest pain.   ·  Recommendation: as above- atypical and somewhat reproducible on exam   will plan for Nuc stress Monday.

## 2024-12-30 NOTE — DISCHARGE NOTE NURSING/CASE MANAGEMENT/SOCIAL WORK - FINANCIAL ASSISTANCE
[facility] provides services at a reduced cost to those who are determined to be eligible through [facility]’s financial assistance program. Information regarding [facility]’s financial assistance program can be found by going to [email or web address] or by calling [phone].

## 2024-12-30 NOTE — PROGRESS NOTE ADULT - PROBLEM SELECTOR PLAN 1
Rate-controlled.   Continue metoprolol ER, DC ASA and heparin. Resume Eliquis 5mg PO BID  Follow up  in 2 weeks Rate-controlled.   Continue diltiazem, metoprolol ER, DC ASA and heparin. Resume Eliquis 5mg PO BID  Follow up  in 2 weeks

## 2024-12-30 NOTE — PROGRESS NOTE ADULT - ASSESSMENT
74-yo non compliant F with PMH includes HLD, HTN, A-Fib on Eliquis came in from home s/p near-syncopal episode associated with chest discomfort & palpitations.  Patient reports this a.m. while showering felt acute onset severe lightheadedness /near/syncopal with mild mid chest pressure discomfort & rapid palpitations.        # chest pain -- resolved   # A fib with RVR , suspected related to medication non compliance   # Elevated troponin   -  no palpitation or chest pain Episode but near syncope at home   - s/p Cardizem 120mg x1 then 10mg x1 in ER   - EKG a fib with RVR  143 bpm Qtc 456ms   - trop initially rising 27 > 171 now downtrending to 130  -  Pro BNP 1027  - CXR  negative  - HR controlled since last night   - TTE 2022 Normal left ventricular internal dimensions and systolic function,  LVEF of 60%, TTE 2024: EF 55-60%  - Continue home Cardizem, BB  - continue Eliquis BID  - cardio consulted, follow recs    # HLD  - Continue statin and fibrates     # DVT pro   - Eliquis BID     #dispo - pending cards eval. PT 
74-yo non compliant F with PMH includes HLD, HTN, A-Fib on Eliquis came in from home s/p near-syncopal episode associated with chest discomfort & palpitations.  Patient reports this a.m. while showering felt acute onset severe lightheadedness /near/syncopal with mild mid chest pressure discomfort & rapid palpitations.        # chest pain -- resolved   # A fib with RVR , suspected related to medication non compliance   # Elevated troponin   -  no palpitation or chest pain Episode but near syncope at home   - s/p Cardizem 120mg x1 then 10mg x1 in ER   - EKG a fib with RVR  143 bpm Qtc 456ms   - trop initially rising 27 > 171 now downtrending to 130  -  Pro BNP 1027  - CXR  negative  - HR controlled since last night   - TTE 2022 Normal left ventricular internal dimensions and systolic function,  LVEF of 60%, TTE 2024: EF 55-60%  - Continue home Cardizem, BB  - continue Eliquis BID  - cardio consulted, follow recs > stress test tomorrow, npo@[place]    # HLD  - Continue statin and fibrates     # DVT pro   - Eliquis BID     #dispo -  stress test tomorrow likely DC after pending results 
74-yo non compliant F with PMH includes HLD, HTN, A-Fib on Eliquis came in from home s/p near-syncopal episode associated with chest discomfort & palpitations.  Patient reports this a.m. while showering felt acute onset severe lightheadedness /near/syncopal with mild mid chest pressure discomfort & rapid palpitations.        # chest pain -- resolved   # A fib with RVR , suspected related to medication non compliance   # Elevated troponin   -  no palpitation or chest pain Episode but near syncope at home   - s/p Cardizem 120mg x1 then 10mg x1 in ER   - EKG a fib with RVR  143 bpm Qtc 456ms   - trop initially rising 27 > 171 now downtrending to 130  -  Pro BNP 1027  - CXR  negative  - HR controlled since last night   - TTE 2022 Normal left ventricular internal dimensions and systolic function,  LVEF of 60%, TTE 2024: EF 55-60%  - Continue home Cardizem, BB  - continue Eliquis BID  - cardio consulted, follow recs > stress test monday    # HLD  - Continue statin and fibrates     # DVT pro   - Eliquis BID     #dispo -  stress test monday 
74-yo non compliant F with PMH includes HLD, HTN, A-Fib on Eliquis came in from home s/p near-syncopal episode associated with chest discomfort & palpitations.  Patient reports this a.m. while showering felt acute onset severe lightheadedness /near/syncopal with mild mid chest pressure discomfort & rapid palpitations.        # chest pain -- resolved   # A fib with RVR , suspected related to medication non compliance   # Elevated troponin   -  no palpitation or chest pain Episode but near syncope at home   - s/p Cardizem 120mg x1 then 10mg x1 in ER   - EKG a fib with RVR  143 bpm Qtc 456ms   - trop initially rising 27 > 171 now downtrending to 130  -  Pro BNP 1027  - CXR  negative  - HR controlled since last night   - TTE 2022 Normal left ventricular internal dimensions and systolic function,  LVEF of 60%, TTE 2024: EF 55-60%  - Continue home Cardizem, BB  - continue Eliquis BID  - cardio consulted, follow recs > stress test today    # HLD  - Continue statin and fibrates     # DVT pro   - Eliquis BID (will switch heparin to eliquis post stress test if no intervention)    #dispo -  stress test results pending, possible DC to [facility] will need auth

## 2024-12-30 NOTE — PROGRESS NOTE ADULT - TIME BILLING
Time spent coordinating the patient's care. This includes reviewing documentation pertinent to this admission, results and imaging. Further tests, medications, and procedures have been ordered as indicated. Laboratory results and the plan of care were communicated to the patient and family.  Discussed care plan with nursing and will discuss plan and care with appropriate consultant(s).
Time spent coordinating the patient's care. This includes reviewing documentation pertinent to this admission, results and imaging. Further tests, medications, and procedures have been ordered as indicated. Laboratory results and the plan of care were communicated to the patient and family.  Discussed care plan with nursing and will discuss plan and care with appropriate consultant(s). Supporting documentation was completed and added to the patient's chart incuding updated MOLST.

## 2024-12-31 NOTE — CDI QUERY NOTE - NSCDIOTHERTXTBX2_GEN_ALL_CORE_FT
Clinical documentation and/or evidence in the medical record indicates that this patient has a laboratory finding without an associated diagnosis.  In order to ensure accurate coding and accuracy of the clinical record, the documentation in this patient’s medical record requires additional clarification.  Please include documentation of a diagnosis associated with these findings in your progress note and/or discharge summary.    Please clarify if the patient was found to have one of the following:      •Hyponatremia  .Not clinically significant  •Other (specify)    Supporting documentation and/or clinical evidence:     Historical Values  Sodium: 134 mmol/L (12.27.24 @ 07:21)   Sodium: 132 mmol/L (12.26.24 @ 12:49)    Lactate Ringers bolus 500/30 minutes 12/27    ED-· Near syncope. Chief Complaint:  74y Female complaining of chest pain.from home s/p near-syncopal episode associated with chest discomfort & palpitations.  Patient reports this a.m. while showering felt acute onset severe lightheadedness/near-syncopal with mild mid chest pressure discomfort & rapid palpitations. called 911.  Patient reports intermittent mid-chest pressure discomfort since yesterday, 6-7 episodes altogether, each episode 30 minutes or less duration, mild to moderate severityPMH:   HLD, ? HTN, A-Fib Dx'ed 2022 (Eliquis/Metoprolol) w/ echo then normal  LV/RV and EF 60%; diverticulitis, thyroid nodule, ventral hernia repair with mesh.	CV: + Tachycardic, irregularly irregular, normal radial pulse Lungs: CTA,  mild tachypnea, no retractions · Creatinine1.28[0.50 - 1.30 mg/dL]· eGFR  43

## 2024-12-31 NOTE — CDI QUERY NOTE - NSCDIOTHERTXTBX_GEN_ALL_CORE_HH
Clinical documentation and/or evidence in the medical record indicates that this patient has atrial fibrillation, chronic with Rapid Ventricular response (RVR) elevated troponin, Sodium 132, 134 and reports syncope at home with chest pain .  Clinical documentation and/or evidence of the patient’s presentation, evaluation, and medical management, as evidenced below, may support a diagnosis that is not documented in the medical record.  In order to ensure In order to ensure accurate coding and accuracy of the clinical record, the documentation in this patient’s medical record requires additional clarification.      Could you please further clarify if the elevated troponin blood levels are associated with:    -Elevated troponin blood levels associated with demand ischemia due to atrial fibrillation with RVR  -Elevated troponin blood levels  due to atrial fibrillation with RVR   •Other (specify)      Supporting documentation and/or clinical evidence:     Cardiology-In the ED, labs notable for Hs trop 78, 127, 174 (peak), 131. ECG Afib with RVR at 143 bpm .s/p cardizem 120mg x1 then 10mg x1 (26 Dec 2024 14:49)pt presenting with dizziness found to be in rapid Afib - in setting medical non-adherence rate now improved cont cardizem and toprol on heparin gtt in place of eliquis favor rate control strategy due to med non-adherence. Problem: Elevated troponin. ·  Plan: ·  Problem: Elevated troponin. ·  Recommendation: mildly elevated in setting of Afib with RVR. trend not consistent with ACS  cont aspirin, would increase atorvastatin 40mg.Problem: Chest pain. ·  Plan: ·  Problem: Chest pain. ·  Recommendation: as above- atypical and somewhat reproducible on exam will plan for Nuc stress Monday.      DC summary -# chest pain -- resolved # A fib with RVR , suspected related to medication non compliance # Elevated troponin -  no palpitation or chest pain Episode but near syncope at home - s/p Cardizem 120mg x1 then 10mg x1 in ER - EKG a fib with RVR  143 bpm Qtc 456ms - trop initiallyrising 27 > 171 now downtrending to 130-  Pro BNP 1027- CXR  negative- HR controlled since last night - TTE 2022 Normal left ventricular internal dimensions and systolic function,  LVEF of 60%, TTE 2024: EF 55-60%    Sodium: 134 mmol/L (12.27.24 @ 07:21) Sodium: 132 mmol/L (12.26.24 @ 12:49)   Potassium: 3.4 mmol/L (12.27.24 @ 07:21) Potassium: 4.0 mmol/L (12.26.24 @ 12:49)     For reference, please see the [facility] Provider Atrial Fibrillation Clinical Paper located on the [facility] Intranet - Clinical documentation improvement

## 2025-01-07 ENCOUNTER — NON-APPOINTMENT (OUTPATIENT)
Age: 75
End: 2025-01-07

## 2025-01-07 ENCOUNTER — APPOINTMENT (OUTPATIENT)
Dept: CARDIOLOGY | Facility: CLINIC | Age: 75
End: 2025-01-07
Payer: MEDICARE

## 2025-01-07 VITALS
HEART RATE: 66 BPM | OXYGEN SATURATION: 99 % | WEIGHT: 170 LBS | BODY MASS INDEX: 29.02 KG/M2 | HEIGHT: 64 IN | SYSTOLIC BLOOD PRESSURE: 126 MMHG | DIASTOLIC BLOOD PRESSURE: 70 MMHG

## 2025-01-07 DIAGNOSIS — E78.5 HYPERLIPIDEMIA, UNSPECIFIED: ICD-10-CM

## 2025-01-07 DIAGNOSIS — I48.0 PAROXYSMAL ATRIAL FIBRILLATION: ICD-10-CM

## 2025-01-07 DIAGNOSIS — R79.89 OTHER SPECIFIED ABNORMAL FINDINGS OF BLOOD CHEMISTRY: ICD-10-CM

## 2025-01-07 PROCEDURE — 93000 ELECTROCARDIOGRAM COMPLETE: CPT

## 2025-01-07 PROCEDURE — G2211 COMPLEX E/M VISIT ADD ON: CPT

## 2025-01-07 PROCEDURE — 99214 OFFICE O/P EST MOD 30 MIN: CPT

## 2025-01-07 RX ORDER — DILTIAZEM HYDROCHLORIDE 120 MG/1
120 CAPSULE, EXTENDED RELEASE ORAL DAILY
Qty: 90 | Refills: 3 | Status: ACTIVE | COMMUNITY
Start: 1900-01-01 | End: 1900-01-01

## 2025-01-07 RX ORDER — FENOFIBRATE 160 MG/1
160 TABLET ORAL DAILY
Qty: 90 | Refills: 1 | Status: ACTIVE | COMMUNITY
Start: 1900-01-01 | End: 1900-01-01

## 2025-01-07 RX ORDER — NYSTATIN 100000 [USP'U]/G
100000 POWDER TOPICAL
Qty: 1 | Refills: 1 | Status: ACTIVE | COMMUNITY
Start: 2025-01-07

## 2025-01-07 RX ORDER — METOPROLOL SUCCINATE 50 MG/1
50 TABLET, EXTENDED RELEASE ORAL DAILY
Qty: 90 | Refills: 2 | Status: ACTIVE | COMMUNITY
Start: 1900-01-01 | End: 1900-01-01

## 2025-01-07 RX ORDER — SIMVASTATIN 20 MG/1
20 TABLET, FILM COATED ORAL
Qty: 1 | Refills: 1 | Status: ACTIVE | COMMUNITY
Start: 1900-01-01 | End: 1900-01-01

## 2025-01-07 RX ORDER — APIXABAN 5 MG/1
5 TABLET, FILM COATED ORAL
Qty: 180 | Refills: 3 | Status: ACTIVE | COMMUNITY
Start: 1900-01-01 | End: 1900-01-01

## 2025-01-07 RX ORDER — DONEPEZIL HYDROCHLORIDE 5 MG/1
5 TABLET ORAL DAILY
Refills: 0 | Status: ACTIVE | COMMUNITY

## 2025-01-07 RX ORDER — ASPIRIN ENTERIC COATED TABLETS 81 MG 81 MG/1
81 TABLET, DELAYED RELEASE ORAL DAILY
Qty: 90 | Refills: 3 | Status: ACTIVE | COMMUNITY
Start: 2025-01-07 | End: 1900-01-01

## 2025-01-08 NOTE — CHART NOTE - NSCHARTNOTEFT_GEN_A_CORE
1. Elevated troponin blood levels associated with demand ischemia due to atrial fibrillation with RVR    2. Hyponatremia, Not clinically significant

## 2025-01-09 DIAGNOSIS — E78.5 HYPERLIPIDEMIA, UNSPECIFIED: ICD-10-CM

## 2025-01-09 DIAGNOSIS — Z79.01 LONG TERM (CURRENT) USE OF ANTICOAGULANTS: ICD-10-CM

## 2025-01-09 DIAGNOSIS — E87.1 HYPO-OSMOLALITY AND HYPONATREMIA: ICD-10-CM

## 2025-01-09 DIAGNOSIS — I48.20 CHRONIC ATRIAL FIBRILLATION, UNSPECIFIED: ICD-10-CM

## 2025-01-09 DIAGNOSIS — I10 ESSENTIAL (PRIMARY) HYPERTENSION: ICD-10-CM

## 2025-01-09 DIAGNOSIS — Z91.012 ALLERGY TO EGGS: ICD-10-CM

## 2025-01-09 DIAGNOSIS — Z79.82 LONG TERM (CURRENT) USE OF ASPIRIN: ICD-10-CM

## 2025-01-09 DIAGNOSIS — I24.89 OTHER FORMS OF ACUTE ISCHEMIC HEART DISEASE: ICD-10-CM

## 2025-01-21 ENCOUNTER — APPOINTMENT (OUTPATIENT)
Dept: ORTHOPEDIC SURGERY | Facility: CLINIC | Age: 75
End: 2025-01-21

## 2025-04-25 ENCOUNTER — NON-APPOINTMENT (OUTPATIENT)
Age: 75
End: 2025-04-25

## 2025-04-25 ENCOUNTER — APPOINTMENT (OUTPATIENT)
Dept: CARDIOLOGY | Facility: CLINIC | Age: 75
End: 2025-04-25
Payer: MEDICARE

## 2025-04-25 VITALS
WEIGHT: 173 LBS | SYSTOLIC BLOOD PRESSURE: 130 MMHG | OXYGEN SATURATION: 95 % | DIASTOLIC BLOOD PRESSURE: 78 MMHG | HEIGHT: 64 IN | BODY MASS INDEX: 29.53 KG/M2 | HEART RATE: 69 BPM

## 2025-04-25 DIAGNOSIS — R79.89 OTHER SPECIFIED ABNORMAL FINDINGS OF BLOOD CHEMISTRY: ICD-10-CM

## 2025-04-25 DIAGNOSIS — E78.5 HYPERLIPIDEMIA, UNSPECIFIED: ICD-10-CM

## 2025-04-25 DIAGNOSIS — I48.0 PAROXYSMAL ATRIAL FIBRILLATION: ICD-10-CM

## 2025-04-25 PROCEDURE — 99214 OFFICE O/P EST MOD 30 MIN: CPT

## 2025-04-25 PROCEDURE — 93000 ELECTROCARDIOGRAM COMPLETE: CPT

## 2025-04-25 PROCEDURE — G2211 COMPLEX E/M VISIT ADD ON: CPT
